# Patient Record
Sex: MALE | Race: BLACK OR AFRICAN AMERICAN | Employment: OTHER | URBAN - METROPOLITAN AREA
[De-identification: names, ages, dates, MRNs, and addresses within clinical notes are randomized per-mention and may not be internally consistent; named-entity substitution may affect disease eponyms.]

---

## 2016-12-06 LAB — PSA, EXTERNAL: 0.7

## 2017-01-19 ENCOUNTER — HOSPITAL ENCOUNTER (OUTPATIENT)
Dept: LAB | Age: 77
Discharge: HOME | End: 2017-01-19
Payer: MEDICARE

## 2017-01-19 DIAGNOSIS — E78.5 DYSLIPIDEMIA: ICD-10-CM

## 2017-01-19 DIAGNOSIS — I10 ESSENTIAL HYPERTENSION: ICD-10-CM

## 2017-01-19 LAB
ALBUMIN SERPL BCP-MCNC: 4.1 G/DL (ref 3.4–5)
ALBUMIN/GLOB SERPL: 1.3 {RATIO} (ref 0.8–1.7)
ALP SERPL-CCNC: 89 U/L (ref 45–117)
ALT SERPL-CCNC: 25 U/L (ref 16–61)
ANION GAP BLD CALC-SCNC: 8 MMOL/L (ref 3–18)
AST SERPL W P-5'-P-CCNC: 20 U/L (ref 15–37)
BILIRUB SERPL-MCNC: 0.6 MG/DL (ref 0.2–1)
BUN SERPL-MCNC: 11 MG/DL (ref 7–18)
BUN/CREAT SERPL: 9 (ref 12–20)
CALCIUM SERPL-MCNC: 9 MG/DL (ref 8.5–10.1)
CHLORIDE SERPL-SCNC: 104 MMOL/L (ref 100–108)
CHOLEST SERPL-MCNC: 189 MG/DL
CO2 SERPL-SCNC: 28 MMOL/L (ref 21–32)
CREAT SERPL-MCNC: 1.16 MG/DL (ref 0.6–1.3)
GLOBULIN SER CALC-MCNC: 3.1 G/DL (ref 2–4)
GLUCOSE SERPL-MCNC: 100 MG/DL (ref 74–99)
HDLC SERPL-MCNC: 53 MG/DL (ref 40–60)
HDLC SERPL: 3.6 {RATIO} (ref 0–5)
LDLC SERPL CALC-MCNC: 120.6 MG/DL (ref 0–100)
LIPID PROFILE,FLP: ABNORMAL
POTASSIUM SERPL-SCNC: 4.3 MMOL/L (ref 3.5–5.5)
PROT SERPL-MCNC: 7.2 G/DL (ref 6.4–8.2)
SODIUM SERPL-SCNC: 140 MMOL/L (ref 136–145)
TRIGL SERPL-MCNC: 77 MG/DL (ref ?–150)
VLDLC SERPL CALC-MCNC: 15.4 MG/DL

## 2017-01-19 PROCEDURE — 80053 COMPREHEN METABOLIC PANEL: CPT | Performed by: INTERNAL MEDICINE

## 2017-01-19 PROCEDURE — 80061 LIPID PANEL: CPT | Performed by: INTERNAL MEDICINE

## 2017-01-19 PROCEDURE — 36415 COLL VENOUS BLD VENIPUNCTURE: CPT | Performed by: INTERNAL MEDICINE

## 2017-01-26 ENCOUNTER — OFFICE VISIT (OUTPATIENT)
Dept: INTERNAL MEDICINE CLINIC | Age: 77
End: 2017-01-26

## 2017-01-26 VITALS
RESPIRATION RATE: 18 BRPM | DIASTOLIC BLOOD PRESSURE: 57 MMHG | HEART RATE: 68 BPM | WEIGHT: 221 LBS | TEMPERATURE: 97.9 F | OXYGEN SATURATION: 98 % | HEIGHT: 72 IN | SYSTOLIC BLOOD PRESSURE: 113 MMHG | BODY MASS INDEX: 29.93 KG/M2

## 2017-01-26 DIAGNOSIS — I10 ESSENTIAL HYPERTENSION: Primary | ICD-10-CM

## 2017-01-26 DIAGNOSIS — E78.5 DYSLIPIDEMIA: ICD-10-CM

## 2017-01-26 NOTE — PATIENT INSTRUCTIONS

## 2017-01-26 NOTE — MR AVS SNAPSHOT
Visit Information Date & Time Provider Department Dept. Phone Encounter #  
 1/26/2017  8:00 AM Chaitanya Camp MD [facility] [employer] [facility]  Follow-up Instructions Return in about 6 months (around 7/26/2017) for OV, and Medicare Wellness Visit, labs 1 week before. Your Appointments 2/15/2017  3:20 PM  
CARELINK with [place]  [facility] [place] [facility] ([facility]) Appt Note: Carelink in 3 months [facility] ([facility] [address] [address] [id number] [phone] Cecil Yi  
  
    
 3/3/2017  8:50 AM  
Nurse Visit with [facility] NURSE [facility] ([facility]) Appt Note: Psa //[employer] [address] [place] [address] [phone]  
  
   
 [address] [place] [address]  
  
    
 3/10/2017 10:15 AM  
ESTABLISHED PATIENT with Navin Franco MD  
[facility] ([facility]) Appt Note: Mailed appt [address] [place] [address]  
[address] [facility] [address] [place] [address]  
  
    
 5/8/2017  8:00 AM  
Follow Up with Radha Chamberlain MD  
[facility] [place] [facility] ([facility]) Appt Note: Follow up with EKG in 6 months [facility] [address] [address] [phone] [phone] [address] Upcoming Health Maintenance Date Due ZOSTER VACCINE AGE 60> 11/30/1999 GLAUCOMA SCREENING Q2Y 11/30/2004 Pneumococcal 65+ High/Highest Risk (2 of 2 - PPSV23) 1/31/2017* MEDICARE YEARLY EXAM 7/27/2017 DTaP/Tdap/Td series (2 - Td) 10/27/2023 COLONOSCOPY 10/14/2024 *Topic was postponed. The date shown is not the original due date.    
  
Allergies as of 1/26/2017  Review Complete On: 1/26/2017 By: Chaitanya Camp MD  
  
 Severity Noted Reaction Type Reactions Shellfish Containing Products    Swelling Current Immunizations  Reviewed on 11/7/2016 Name Date Influenza Vaccine 11/19/2014, 11/12/2013 Influenza Vaccine (Quad) PF 11/7/2016, 10/23/2015 Influenza Vaccine Split 11/23/2011, 12/15/2010 Influenza Vaccine Whole 12/9/2012 Pneumococcal Conjugate (PCV-13) 7/26/2016 Pneumococcal Vaccine (Unspecified Type) 5/10/2004 TD Vaccine 5/10/2004 Tdap 10/27/2013 10:12 AM  
  
 Not reviewed this visit You Were Diagnosed With   
  
 Codes Comments Essential hypertension    -  Primary ICD-10-CM: I10 
ICD-9-CM: 401.9 Dyslipidemia     ICD-10-CM: E78.5 ICD-9-CM: 272.4 Vitals BP Pulse Temp Resp Height(growth percentile) Weight(growth percentile) 113/57 (BP 1 Location: Left arm, BP Patient Position: Sitting) 68 97.9 °F (36.6 °C) (Oral) 18 6' 0.25\" (1.835 m) 221 lb (100.2 kg) SpO2 BMI Smoking Status 98% 29.77 kg/m2 Former Smoker Vitals History BMI and BSA Data Body Mass Index Body Surface Area  
 29.77 kg/m 2 2.26 m 2 Preferred Pharmacy Pharmacy Name Phone [address], [facility] [phone] Your Updated Medication List  
  
   
This list is accurate as of: 1/26/17  8:35 AM.  Always use your most recent med list.  
  
  
  
  
 aspirin 81 mg tablet Take 2 Tabs by mouth daily. dilTIAZem  mg ER capsule Commonly known as:  CARDIZEM CD  
TAKE 1 CAPSULE TWICE A DAY  
  
 FISH OIL 1,000 mg Cap Generic drug:  omega-3 fatty acids-vitamin e Take 1 Cap by mouth daily. PROSTATE HEALTH FORMULA PO Take 1 Tab by mouth daily. Follow-up Instructions Return in about 6 months (around 7/26/2017) for OV, and Medicare Wellness Visit, labs 1 week before. Patient Instructions DASH Diet: Care Instructions Your Care Instructions The DASH diet is an eating plan that can help lower your blood pressure. DASH stands for Dietary Approaches to Stop Hypertension. Hypertension is high blood pressure. The DASH diet focuses on eating foods that are high in calcium, potassium, and magnesium. These nutrients can lower blood pressure. The foods that are highest in these nutrients are fruits, vegetables, low-fat dairy products, nuts, seeds, and legumes. But taking calcium, potassium, and magnesium supplements instead of eating foods that are high in those nutrients does not have the same effect. The DASH diet also includes whole grains, fish, and poultry. The DASH diet is one of several lifestyle changes your doctor may recommend to lower your high blood pressure. Your doctor may also want you to decrease the amount of sodium in your diet. Lowering sodium while following the DASH diet can lower blood pressure even further than just the DASH diet alone. Follow-up care is a key part of your treatment and safety. Be sure to make and go to all appointments, and call your doctor if you are having problems. It's also a good idea to know your test results and keep a list of the medicines you take. How can you care for yourself at home? Following the DASH diet · Eat 4 to 5 servings of fruit each day. A serving is 1 medium-sized piece of fruit, ½ cup chopped or canned fruit, 1/4 cup dried fruit, or 4 ounces (½ cup) of fruit juice. Choose fruit more often than fruit juice. · Eat 4 to 5 servings of vegetables each day. A serving is 1 cup of lettuce or raw leafy vegetables, ½ cup of chopped or cooked vegetables, or 4 ounces (½ cup) of vegetable juice. Choose vegetables more often than vegetable juice. · Get 2 to 3 servings of low-fat and fat-free dairy each day. A serving is 8 ounces of milk, 1 cup of yogurt, or 1 ½ ounces of cheese. · Eat 6 to 8 servings of grains each day.  A serving is 1 slice of bread, 1 ounce of dry cereal, or ½ cup of cooked rice, pasta, or cooked cereal. Try to choose whole-grain products as much as possible. · Limit lean meat, poultry, and fish to 2 servings each day. A serving is 3 ounces, about the size of a deck of cards. · Eat 4 to 5 servings of nuts, seeds, and legumes (cooked dried beans, lentils, and split peas) each week. A serving is 1/3 cup of nuts, 2 tablespoons of seeds, or ½ cup of cooked beans or peas. · Limit fats and oils to 2 to 3 servings each day. A serving is 1 teaspoon of vegetable oil or 2 tablespoons of salad dressing. · Limit sweets and added sugars to 5 servings or less a week. A serving is 1 tablespoon jelly or jam, ½ cup sorbet, or 1 cup of lemonade. · Eat less than 2,300 milligrams (mg) of sodium a day. If you limit your sodium to 1,500 mg a day, you can lower your blood pressure even more. Tips for success · Start small. Do not try to make dramatic changes to your diet all at once. You might feel that you are missing out on your favorite foods and then be more likely to not follow the plan. Make small changes, and stick with them. Once those changes become habit, add a few more changes. · Try some of the following: ¨ Make it a goal to eat a fruit or vegetable at every meal and at snacks. This will make it easy to get the recommended amount of fruits and vegetables each day. ¨ Try yogurt topped with fruit and nuts for a snack or healthy dessert. ¨ Add lettuce, tomato, cucumber, and onion to sandwiches. ¨ Combine a ready-made pizza crust with low-fat mozzarella cheese and lots of vegetable toppings. Try using tomatoes, squash, spinach, broccoli, carrots, cauliflower, and onions. ¨ Have a variety of cut-up vegetables with a low-fat dip as an appetizer instead of chips and dip. ¨ Sprinkle sunflower seeds or chopped almonds over salads. Or try adding chopped walnuts or almonds to cooked vegetables. ¨ Try some vegetarian meals using beans and peas. Add garbanzo or kidney beans to salads. Make burritos and tacos with mashed martinez beans or black beans. Where can you learn more? Go to http://lachelle-gi.info/. Enter G936 in the search box to learn more about \"DASH Diet: Care Instructions. \" Current as of: March 23, 2016 Content Version: 11.1 © 7040-9355 [employer]. Care instructions adapted under license by [employer] (which disclaims liability or warranty for this information). If you have questions about a medical condition or this instruction, always ask your healthcare professional. [place] [address] any warranty or liability for your use of this information. Introducing [facility] & HE[facility] SERVICES! Dear Mary Ann Sauer: Thank you for requesting a [employer] account. Our records indicate that you already have an active [employer] account. You can access your account anytime at [email or web address] Did you know that you can access your hospital and ER discharge instructions at any time in [employer]? You can also review all of your test results from your hospital stay or ER visit. Additional Information If you have questions, please visit the Frequently Asked Questions section of the [employer] website at [email or web address] Remember, [employer] is NOT to be used for urgent needs. For medical emergencies, dial 911. Now available from your iPhone and Android! Please provide this summary of care documentation to your next provider. Your primary care clinician is listed as JOSEP WOODALL. If you have any questions after today's visit, please call [phone].

## 2017-01-26 NOTE — PROGRESS NOTES
Cecil Ortiz is a 67 y.o.  male and presents with Cholesterol Problem and Hypertension (f/u)      SUBJECTIVE:  Pt's HTN and Afib is well controlled on Cardizem. His cholesterol remains borderline controlled. He is currently a candidate for statin therapy but is currently declining using more medication. He will also try to exercise more to improve his cholesterol. His right knee stops him from walking so he will try to find something else to do for exercise. Pt continues to be followed by cardiology for h/o PAF    Respiratory ROS: negative for - shortness of breath  Cardiovascular ROS: negative for - chest pain    Current Outpatient Prescriptions   Medication Sig    dilTIAZem CD (CARDIZEM CD) 240 mg ER capsule TAKE 1 CAPSULE TWICE A DAY    aspirin 81 mg tablet Take 2 Tabs by mouth daily.  ZINC MTH/COPPER/SAW PALM/GNSG (PROSTATE HEALTH FORMULA PO) Take 1 Tab by mouth daily.  omega-3 fatty acids-vitamin e (FISH OIL) 1,000 mg Cap Take 1 Cap by mouth daily. No current facility-administered medications for this visit.           OBJECTIVE:  alert, well appearing, and in no distress  Visit Vitals    /57 (BP 1 Location: Left arm, BP Patient Position: Sitting)    Pulse 68    Temp 97.9 °F (36.6 °C) (Oral)    Resp 18    Ht 6' 0.25\" (1.835 m)    Wt 221 lb (100.2 kg)    SpO2 98%    BMI 29.77 kg/m2      well developed and well nourished  Neck - supple, no significant adenopathy  Chest - clear to auscultation, no wheezes, rales or rhonchi, symmetric air entry  Heart - normal rate, regular rhythm, normal S1, S2, no murmurs, rubs, clicks or gallops  Extremities - peripheral pulses normal, no pedal edema, no clubbing or cyanosis  Skin - normal coloration and turgor, no rashes, no suspicious skin lesions noted      Labs:   Lab Results   Component Value Date/Time    Cholesterol, total 189 01/19/2017 07:48 AM    HDL Cholesterol 53 01/19/2017 07:48 AM    LDL, calculated 120.6 01/19/2017 07:48 AM Triglyceride 77 01/19/2017 07:48 AM    CHOL/HDL Ratio 3.6 01/19/2017 07:48 AM     Lab Results   Component Value Date/Time    AST 20 01/19/2017 07:48 AM    Alk. phosphatase 89 01/19/2017 07:48 AM     Lab Results   Component Value Date/Time    GFR est AA >60 01/19/2017 07:48 AM    GFR est non-AA >60 01/19/2017 07:48 AM    Creatinine 1.16 01/19/2017 07:48 AM    BUN 11 01/19/2017 07:48 AM    Sodium 140 01/19/2017 07:48 AM    Potassium 4.3 01/19/2017 07:48 AM    Chloride 104 01/19/2017 07:48 AM    CO2 28 01/19/2017 07:48 AM      Lab Results   Component Value Date/Time    Glucose 100 01/19/2017 07:48 AM        Discussed the patient's BMI with him. The BMI follow up plan is as follows: BMI is out of normal parameters and plan is as follows: I have counseled this patient on diet and exercise regimens. Assessment/Plan      ICD-10-CM ICD-9-CM    1. Essential hypertension I10 401.9 Well controlled on Cardizem METABOLIC PANEL, COMPREHENSIVE   2. Dyslipidemia E78.5 272.4 Will continue to try and improve with diet. Pt declines statin currently LIPID PANEL                Follow-up Disposition:  Return in about 6 months (around 7/26/2017) for OV, and Medicare Wellness Visit, labs 1 week before. Reviewed plan of care. Patient has provided input and agrees with goals.

## 2017-01-26 NOTE — PROGRESS NOTES
Patient is in the office today for a 6 month follow up. Do you have an Advance Directive yes - will bring copy      1. Have you been to the ER, urgent care clinic since your last visit? Hospitalized since your last visit? No    2. Have you seen or consulted any other health care providers outside of the [address] [place] [address] since your last visit? Include any pap smears or colon screening.  No

## 2017-02-15 ENCOUNTER — OFFICE VISIT (OUTPATIENT)
Dept: CARDIOLOGY CLINIC | Age: 77
End: 2017-02-15

## 2017-02-15 DIAGNOSIS — I49.5 SICK SINUS SYNDROME (HCC): Primary | ICD-10-CM

## 2017-02-15 DIAGNOSIS — Z95.0 CARDIAC PACEMAKER IN SITU: ICD-10-CM

## 2017-02-28 NOTE — PROGRESS NOTES
I have personally seen and evaluated the device findings. Interrogation reviewed and I agree with assessment.     Jassi Lucero

## 2017-06-13 RX ORDER — DILTIAZEM HYDROCHLORIDE 240 MG/1
CAPSULE, COATED, EXTENDED RELEASE ORAL
Qty: 180 CAP | Refills: 3 | Status: SHIPPED | OUTPATIENT
Start: 2017-06-13 | End: 2018-07-03 | Stop reason: SDUPTHER

## 2017-06-29 ENCOUNTER — OFFICE VISIT (OUTPATIENT)
Dept: CARDIOLOGY CLINIC | Age: 77
End: 2017-06-29

## 2017-06-29 VITALS
HEART RATE: 64 BPM | SYSTOLIC BLOOD PRESSURE: 122 MMHG | HEIGHT: 75 IN | DIASTOLIC BLOOD PRESSURE: 70 MMHG | WEIGHT: 219 LBS | BODY MASS INDEX: 27.23 KG/M2 | OXYGEN SATURATION: 98 %

## 2017-06-29 DIAGNOSIS — Z95.0 CARDIAC PACEMAKER IN SITU: ICD-10-CM

## 2017-06-29 DIAGNOSIS — E78.5 DYSLIPIDEMIA: ICD-10-CM

## 2017-06-29 DIAGNOSIS — I10 ESSENTIAL HYPERTENSION: Primary | ICD-10-CM

## 2017-06-29 DIAGNOSIS — R07.9 CHEST PAIN, UNSPECIFIED TYPE: ICD-10-CM

## 2017-06-29 DIAGNOSIS — I49.5 SICK SINUS SYNDROME (HCC): ICD-10-CM

## 2017-06-29 DIAGNOSIS — I48.0 PAROXYSMAL ATRIAL FIBRILLATION (HCC): ICD-10-CM

## 2017-06-29 NOTE — PROGRESS NOTES
HISTORY OF PRESENT ILLNESS  Gordon Gifford is a 67 y.o. male. HPI    Patient presents for a scheduled followup visit. He has a past medical history significant for paroxysmal atrial fibrillation, hypertension, and sick sinus syndrome, status post dual-chamber permanent pacemaker in March 2011. The patient was last seen in the office 7 months ago. Since last visit, the patient describes 1 or 2 episodes of exertional chest pain which only lasted for a few seconds before subsiding. This does not occur with every activity and appeared to be very sporadic. He also has noted feet and ankle swelling at the end of the day which he reports as minor, more noticeable when he is on his feet all day which always improves at night when he goes to sleep. He denies any shortness of breath at rest or with exertion. No orthopnea, no PND, no claudication. He states his activity tolerance has not changed since last visit. Past Medical History:   Diagnosis Date    Atrial fibrillation (HCC)     CHADS 0  (-CHF, -HTN, -AGE, -DM, -CVA)    Cardiac echocardiogram 06/29/2010    EF 70-75%. Mild conc LVh. Gr 1 DDfx. Mild DELMY.  Cardiac Holter monitor, normal 06/28/2010    Normal Holter study.  Cardiac nuclear imaging test 11/22/2010    Sm area of apical ischemia and inferolateral scar, both possibly apical thinning. No WMA. EF 60%.  Impotence of organic origin     Pacemaker     3/11  [employer]TR[employer]IC    Personal history of malignant neoplasm of prostate     T1a, [place] 6 (3+3)     Prostate cancer ([address]     PUD (peptic ulcer disease)     Sick sinus syndrome ([address]     status post implantation of Medtronic dual-chamber permanent pacemaker 3/22/11.  Unspecified essential hypertension     Venereal disease       Current Outpatient Prescriptions   Medication Sig Dispense Refill    dilTIAZem CD (CARDIZEM CD) 240 mg ER capsule TAKE 1 CAPSULE TWICE A  Cap 3    aspirin 81 mg tablet Take 2 Tabs by mouth daily.  1 Tab 0    ZINC MTH/COPPER/SAW PALM/GNSG (PROSTATE HEALTH FORMULA PO) Take 1 Tab by mouth daily.  omega-3 fatty acids-vitamin e (FISH OIL) 1,000 mg Cap Take 1 Cap by mouth daily. Allergies   Allergen Reactions    Shellfish Containing Products Swelling      Social History   Substance Use Topics    Smoking status: Former Smoker     Packs/day: 0.25     Years: 1.00     Quit date: 4/8/1981    Smokeless tobacco: Never Used    Alcohol use No         Review of Systems   Constitutional: Negative for chills, fever and weight loss. HENT: Negative for nosebleeds. Eyes: Negative for blurred vision and double vision. Respiratory: Negative for cough, shortness of breath and wheezing. Cardiovascular: Positive for chest pain ( Atypical) and leg swelling. Negative for palpitations, orthopnea, claudication and PND. Gastrointestinal: Negative for abdominal pain, heartburn, nausea and vomiting. Genitourinary: Negative for dysuria and hematuria. Musculoskeletal: Negative for falls and myalgias. Skin: Negative for rash. Neurological: Negative for dizziness, focal weakness and headaches. Endo/Heme/Allergies: Does not bruise/bleed easily. Psychiatric/Behavioral: Negative for substance abuse. Visit Vitals    /70 (BP 1 Location: Left arm, BP Patient Position: Sitting)    Pulse 64    Ht 6' 2.5\" (1.892 m)    Wt 99.3 kg (219 lb)    SpO2 98%    BMI 27.74 kg/m2      Physical Exam   Constitutional: He is oriented to person, place, and time. He appears well-developed and well-nourished. HENT:   Head: Normocephalic and atraumatic. Eyes: Conjunctivae are normal.   Neck: Neck supple. No JVD present. Carotid bruit is not present. Cardiovascular: Normal rate, regular rhythm, S1 normal, S2 normal and normal pulses. Exam reveals no gallop and no S3. No murmur heard. Left-sided pacemaker site is well healed. Pulmonary/Chest: Breath sounds normal. He has no wheezes. He has no rales. Abdominal: Soft. Bowel sounds are normal. There is no tenderness. Musculoskeletal: He exhibits no edema. Neurological: He is alert and oriented to person, place, and time. Skin: Skin is warm and dry. EKG: Atrial paced rhythm, intrinsic QRS conduction with an incomplete right bundle branch block, no ST or T wave abnormalities. No change compared to the previous EKG. Pacemaker interrogation. Battery life estimated at 5 years. No significant arrhythmias identified. Pacing in the atrium 98%, and in the ventricle 0%. Scanned document for details. ASSESSMENT and PLAN    Hypertension. Patient's blood pressure now appears well-controlled on Cardizem as monotherapy. Paroxysmal atrial fibrillation. No significant episodes of atrial fibrillation over the past 18 months. I would continue his regimen of Cardizem and aspirin for now. Sick sinus syndrome. Status post dual-chamber permanent pacemaker. Patient's device is working properly interrogation today. The patient is pacing in the atrium 98% of the time. Battery life estimated at 5 years. Dyslipidemia. This is only been mildly elevated in the past.  This is followed closely by his PCP. The patient works on lifestyle modification. Atypical chest pain. This does not sound cardiac in nature, however, I have asked the patient to let us know if this becomes more frequent or regular. If that is the case, I would recommend a repeat stress test.    Dependent edema. This is mild at best.  At this point I would not recommend any particular therapies. Patient should avoid high sodium containing foods. CareLink device check in 3 months. Followup in 6 months, Sooner if needed.

## 2017-06-29 NOTE — MR AVS SNAPSHOT
Visit Information Date & Time Provider Department Dept. Phone Encounter #  
 6/29/2017  9:20 AM Franko Hunt MD [facility] [address] [id number] Your Appointments 7/20/2017  8:05 AM  
LAB with Carey Felipe MD  
[facility] (--) Appt Note: 6 month follow up and labs [address] [id number] [phone]  
  
   
 [address] [phone]  
  
    
 7/27/2017  8:30 AM  
Office Visit with Carey Felipe MD  
[facility] at [facility] (--) Appt Note: 6 mos fu per NH and [address] [id number] [phone]  
  
   
 [address] [phone] 9/15/2017 10:30 AM  
Nurse Visit with [facility] NURSE [facility] ([facility]) Appt Note: Return in about 2 months (around 5/10/2017) for LUTS. [address] [place] [address]  
[address] [facility] [address] [place] [address]  
  
    
  
 9/22/2017 10:15 AM  
Any with Alivia Flores MD  
[facility] ([address] [place] [address]) Appt Note: Return in about 2 months (around 5/10/2017) for LUTS. [address] [place] [address]  
[address] [facility] [address] [facility] [address] [place] [address] Upcoming Health Maintenance Date Due ZOSTER VACCINE AGE 60> 11/30/1999 GLAUCOMA SCREENING Q2Y 11/30/2004 Pneumococcal 65+ High/Highest Risk (2 of 2 - PPSV23) 9/20/2016 MEDICARE YEARLY EXAM 7/27/2017 INFLUENZA AGE 9 TO ADULT 8/1/2017 DTaP/Tdap/Td series (2 - Td) 10/27/2023 COLONOSCOPY 10/14/2024 Allergies as of 6/29/2017  Review Complete On: 6/29/2017 By: Donna Aguiar Severity Noted Reaction Type Reactions Shellfish Containing Products    Swelling Current Immunizations  Reviewed on 11/7/2016 Name Date Influenza Vaccine 11/19/2014, 11/12/2013 Influenza Vaccine (Quad) PF 11/7/2016, 10/23/2015 Influenza Vaccine Split 11/23/2011, 12/15/2010 Influenza Vaccine Whole 12/9/2012 Pneumococcal Conjugate (PCV-13) 7/26/2016 Pneumococcal Vaccine (Unspecified Type) 5/10/2004 TD Vaccine 5/10/2004 Tdap 10/27/2013 10:12 AM  
  
 Not reviewed this visit You Were Diagnosed With   
  
 Codes Comments Essential hypertension    -  Primary ICD-10-CM: I10 
ICD-9-CM: 401.9 Vitals BP Pulse Height(growth percentile) Weight(growth percentile) SpO2 BMI  
 122/70 (BP 1 Location: Left arm, BP Patient Position: Sitting) 64 6' 2.5\" (1.892 m) 219 lb (99.3 kg) 98% 27.74 kg/m2 Smoking Status Former Smoker Vitals History BMI and BSA Data Body Mass Index Body Surface Area  
 27.74 kg/m 2 2.28 m 2 Preferred Pharmacy Pharmacy Name Phone [address] [facility] [phone] Your Updated Medication List  
  
   
This list is accurate as of: 6/29/17  9:47 AM.  Always use your most recent med list.  
  
  
  
  
 aspirin 81 mg tablet Take 2 Tabs by mouth daily. dilTIAZem  mg ER capsule Commonly known as:  CARDIZEM CD  
TAKE 1 CAPSULE TWICE A DAY  
  
 FISH OIL 1,000 mg Cap Generic drug:  omega-3 fatty acids-vitamin e Take 1 Cap by mouth daily. PROSTATE HEALTH FORMULA PO Take 1 Tab by mouth daily. We Performed the Following AMB POC EKG ROUTINE W/ 12 LEADS, INTER & REP [12298 CPT(R)] Introducing [facility]! Dear An Worley: Thank you for requesting a [email or web address] account. Our records indicate that you already have an active [email or web address] account. You can access your account anytime at [email or web address] See [employer] Did you know that you can access your hospital and ER discharge instructions at any time in [email or web address]?   You can also review all of your test results from your hospital stay or ER visit. Additional Information If you have questions, please visit the Frequently Asked Questions section of the [employer] website at [email or web address] Remember, [employer] is NOT to be used for urgent needs. For medical emergencies, dial 911. Now available from your iPhone and Android! Please provide this summary of care documentation to your next provider. Your primary care clinician is listed as JOSEP WOODALL. If you have any questions after today's visit, please call [phone].

## 2017-07-20 ENCOUNTER — HOSPITAL ENCOUNTER (OUTPATIENT)
Dept: LAB | Age: 77
Discharge: HOME | End: 2017-07-20
Payer: MEDICARE

## 2017-07-20 DIAGNOSIS — E78.5 DYSLIPIDEMIA: ICD-10-CM

## 2017-07-20 DIAGNOSIS — I10 ESSENTIAL HYPERTENSION: ICD-10-CM

## 2017-07-20 LAB
ALBUMIN SERPL BCP-MCNC: 4.1 G/DL (ref 3.4–5)
ALBUMIN/GLOB SERPL: 1.4 {RATIO} (ref 0.8–1.7)
ALP SERPL-CCNC: 83 U/L (ref 45–117)
ALT SERPL-CCNC: 25 U/L (ref 16–61)
ANION GAP BLD CALC-SCNC: 7 MMOL/L (ref 3–18)
AST SERPL W P-5'-P-CCNC: 17 U/L (ref 15–37)
BILIRUB SERPL-MCNC: 0.5 MG/DL (ref 0.2–1)
BUN SERPL-MCNC: 14 MG/DL (ref 7–18)
BUN/CREAT SERPL: 12 (ref 12–20)
CALCIUM SERPL-MCNC: 8.5 MG/DL (ref 8.5–10.1)
CHLORIDE SERPL-SCNC: 105 MMOL/L (ref 100–108)
CHOLEST SERPL-MCNC: 165 MG/DL
CO2 SERPL-SCNC: 27 MMOL/L (ref 21–32)
CREAT SERPL-MCNC: 1.13 MG/DL (ref 0.6–1.3)
GLOBULIN SER CALC-MCNC: 3 G/DL (ref 2–4)
GLUCOSE SERPL-MCNC: 95 MG/DL (ref 74–99)
HDLC SERPL-MCNC: 50 MG/DL (ref 40–60)
HDLC SERPL: 3.3 {RATIO} (ref 0–5)
LDLC SERPL CALC-MCNC: 99.2 MG/DL (ref 0–100)
LIPID PROFILE,FLP: NORMAL
POTASSIUM SERPL-SCNC: 4.2 MMOL/L (ref 3.5–5.5)
PROT SERPL-MCNC: 7.1 G/DL (ref 6.4–8.2)
SODIUM SERPL-SCNC: 139 MMOL/L (ref 136–145)
TRIGL SERPL-MCNC: 79 MG/DL (ref ?–150)
VLDLC SERPL CALC-MCNC: 15.8 MG/DL

## 2017-07-20 PROCEDURE — 80053 COMPREHEN METABOLIC PANEL: CPT | Performed by: INTERNAL MEDICINE

## 2017-07-20 PROCEDURE — 36415 COLL VENOUS BLD VENIPUNCTURE: CPT | Performed by: INTERNAL MEDICINE

## 2017-07-20 PROCEDURE — 80061 LIPID PANEL: CPT | Performed by: INTERNAL MEDICINE

## 2017-07-27 ENCOUNTER — OFFICE VISIT (OUTPATIENT)
Dept: INTERNAL MEDICINE CLINIC | Age: 77
End: 2017-07-27

## 2017-07-27 VITALS
WEIGHT: 219 LBS | SYSTOLIC BLOOD PRESSURE: 106 MMHG | RESPIRATION RATE: 18 BRPM | TEMPERATURE: 98.2 F | DIASTOLIC BLOOD PRESSURE: 54 MMHG | OXYGEN SATURATION: 98 % | BODY MASS INDEX: 27.23 KG/M2 | HEART RATE: 64 BPM | HEIGHT: 75 IN

## 2017-07-27 DIAGNOSIS — Z13.39 SCREENING FOR ALCOHOLISM: ICD-10-CM

## 2017-07-27 DIAGNOSIS — E78.5 DYSLIPIDEMIA: ICD-10-CM

## 2017-07-27 DIAGNOSIS — I10 ESSENTIAL HYPERTENSION: ICD-10-CM

## 2017-07-27 DIAGNOSIS — Z23 ENCOUNTER FOR IMMUNIZATION: ICD-10-CM

## 2017-07-27 DIAGNOSIS — Z00.00 ROUTINE GENERAL MEDICAL EXAMINATION AT A HEALTH CARE FACILITY: Primary | ICD-10-CM

## 2017-07-27 NOTE — PROGRESS NOTES
Patient is in the office today for a 6  month follow up, and Medicare Wellness Visit. 1. Have you been to the ER, urgent care clinic since your last visit? Hospitalized since your last visit? No    2. Have you seen or consulted any other health care providers outside of the [address] [place] [address] since your last visit? Include any pap smears or colon screening. No        This is a Subsequent Medicare Annual Wellness Visit providing Personalized Prevention Plan Services (PPPS) (Performed 12 months after initial AWV and PPPS )    I have reviewed the patient's medical history in detail and updated the computerized patient record. History     Past Medical History:   Diagnosis Date    Atrial fibrillation (HCC)     CHADS 0  (-CHF, -HTN, -AGE, -DM, -CVA)    Cardiac echocardiogram 06/29/2010    EF 70-75%. Mild conc LVh. Gr 1 DDfx. Mild DELMY.  Cardiac Holter monitor, normal 06/28/2010    Normal Holter study.  Cardiac nuclear imaging test 11/22/2010    Sm area of apical ischemia and inferolateral scar, both possibly apical thinning. No WMA. EF 60%.  Impotence of organic origin     Pacemaker     3/11  MEDTRONIC    Personal history of malignant neoplasm of prostate     T1a, [place] 6 (3+3)     Prostate cancer ([facility] [facility]     PUD (peptic ulcer disease)     Sick sinus syndrome ([facility]     status post implantation of Medtronic dual-chamber permanent pacemaker 3/22/11.  Unspecified essential hypertension     Venereal disease       Past Surgical History:   Procedure Laterality Date    HX CATARACT REMOVAL      HX MOHS PROCEDURES  1996    NV COLONOSCOPY FLX DX W/COLLJ SPEC WHEN PFRMD      NV CRYOSURG ABLATION OF PROSTATE  2/08    [facility], Dr. Claudy Donaldson     Current Outpatient Prescriptions   Medication Sig Dispense Refill    dilTIAZem CD (CARDIZEM CD) 240 mg ER capsule TAKE 1 CAPSULE TWICE A  Cap 3    aspirin 81 mg tablet Take 2 Tabs by mouth daily.  1 Tab 0    ZINC MTH/COPPER/SAW PALM/GNSG (PROSTATE HEALTH FORMULA PO) Take 1 Tab by mouth daily.  omega-3 fatty acids-vitamin e (FISH OIL) 1,000 mg Cap Take 1 Cap by mouth daily. Allergies   Allergen Reactions    Shellfish Containing Products Swelling     History reviewed. No pertinent family history. Social History   Substance Use Topics    Smoking status: Former Smoker     Packs/day: 0.25     Years: 1.00     Quit date: 4/8/1981    Smokeless tobacco: Never Used    Alcohol use No     Patient Active Problem List   Diagnosis Code    Atrial fibrillation (HCC) I48.91    Sick sinus syndrome (HCC) I49.5    Impotence of organic origin N52.9    Personal history of malignant neoplasm of prostate Z85.46    Elevated prostate specific antigen (PSA) R97.20    HTN (hypertension) I10    Malignant neoplasm of prostate ([facility] [address] C61    Dyslipidemia E78.5    Essential hypertension I10    ACP (advance care planning) Z71.89    Cardiac pacemaker in situ Z95.0       Depression Risk Factor Screening:     PHQ over the last two weeks 7/27/2017   Little interest or pleasure in doing things Not at all   Feeling down, depressed or hopeless Not at all   Total Score PHQ 2 0     Alcohol Risk Factor Screening:   Patient states he does not drink alcohol. Functional Ability and Level of Safety:     Hearing Loss   Patient states he has some hearing loss. Activities of Daily Living   Self-care. Requires assistance with: no ADLs    Fall Risk     Fall Risk Assessment, last 12 mths 7/27/2017   Able to walk? Yes   Fall in past 12 months? No   Fall with injury? -   Number of falls in past 12 months -     Abuse Screen   Patient is not abused    Review of Systems   Pertinent items are noted in HPI.     Physical Examination     Evaluation of Cognitive Function:  Mood/affect:  neutral  Appearance: age appropriate  Family member/caregiver input: none    Visit Vitals    /54 (BP 1 Location: Left arm, BP Patient Position: Sitting)    Pulse 64    Temp 98.2 °F (36.8 °C) (Oral)    Resp 18    Ht 6' 2.5\" (1.892 m)    Wt 219 lb (99.3 kg)    SpO2 98%    BMI 27.74 kg/m2       Patient Care Team:  Astrid Vickers MD as PCP - Bernadette Vines MD (Urology)  Gaviota Nice MD (Cardiology)  Scott Lechuga MD as Hospitalist (Radiation Oncology)    Advice/Referrals/Counseling   Education and counseling provided:  Are appropriate based on today's review and evaluation  End-of-Life planning (with patient's consent)    Glaucoma Screening-  Has not seen one in 3 yrs. Pt is aware he should make an appointment    Pneumonia Vaccine- UTD  Shingles Vaccine-  Declines currently    Tdap Vaccine-  10/2013    Colonoscopy-  10/2014 Dr Biswas Been normal f/u in 10 yrs    PSA- followed by Dr Dykes Nurse for elevated PSA    Advance Directive-  Has one. Will try to give us copy     Assessment/Plan       ICD-10-CM ICD-9-CM    1. Routine general medical examination at a health care facility Z00.00 V70.0    2. Screening for alcoholism Z13.89 V79.1    3. Encounter for immunization Z23 V03.89    4. Essential hypertension I10 401.9    . A comprehensive 5 year plan for medical care and screening exams was reviewed with pt and they received a copy of it.

## 2017-07-27 NOTE — MR AVS SNAPSHOT
Visit Information Date & Time Provider Department Dept. Phone Encounter #  
 7/27/2017  8:30 AM Binu Garcia MD [facility] [phone] Follow-up Instructions Return in about 6 months (around 1/27/2018) for labs 1 week before. Your Appointments 9/15/2017 10:30 AM  
Nurse Visit with [facility] KAL NURSE [facility] ([address] [place] [address]) Appt Note: Return in about 2 months (around 5/10/2017) for LUTS. [address] [place] [address]  
[phone]  
  
   
 [address] [place] [address]  
  
    
 12/28/2017  1:40 PM  
Follow Up with Peola Kayser, MD  
[facility] ([address] [place] [address]) Appt Note: [facility] [address] [address] [address] [phone] [address] [place] [address]  
  
    
  
 9/22/2017 10:15 AM  
Any with Chevy Deluca MD  
[facility] ([address] [place] [address]) Appt Note: Return in about 2 months (around 5/10/2017) for LUTS. [address] [place] [id number]  
[address] [facility] [address] [place] [address] Upcoming Health Maintenance Date Due ZOSTER VACCINE AGE 60> 9/30/1999 GLAUCOMA SCREENING Q2Y 11/30/2004 Pneumococcal 65+ High/Highest Risk (2 of 2 - PPSV23) 9/20/2016 MEDICARE YEARLY EXAM 7/27/2017 INFLUENZA AGE 9 TO ADULT 8/1/2017 DTaP/Tdap/Td series (2 - Td) 10/27/2023 COLONOSCOPY 10/14/2024 Allergies as of 7/27/2017  Review Complete On: 7/27/2017 By: Binu Garcia MD  
  
 Severity Noted Reaction Type Reactions Shellfish Containing Products    Swelling Current Immunizations  Reviewed on 11/7/2016 Name Date Influenza Vaccine 11/19/2014, 11/12/2013 Influenza Vaccine (Quad) PF 11/7/2016, 10/23/2015 Influenza Vaccine Split 11/23/2011, 12/15/2010 Influenza Vaccine Whole 12/9/2012 Pneumococcal Conjugate (PCV-13) 7/26/2016 TD Vaccine 5/10/2004 Tdap 10/27/2013 10:12 AM  
 ZZZ-RETIRED (DO NOT USE) Pneumococcal Vaccine (Unspecified Type) 5/10/2004 Not reviewed this visit You Were Diagnosed With   
  
 Codes Comments Routine general medical examination at a health care facility    -  Primary ICD-10-CM: Z00.00 ICD-9-CM: V70.0 Screening for alcoholism     ICD-10-CM: Z13.89 ICD-9-CM: V79.1 Encounter for immunization     ICD-10-CM: F43 ICD-9-CM: V03.89 Essential hypertension     ICD-10-CM: I10 
ICD-9-CM: 401.9 Vitals BP Pulse Temp Resp Height(growth percentile) Weight(growth percentile) 106/54 (BP 1 Location: Left arm, BP Patient Position: Sitting) 64 98.2 °F (36.8 °C) (Oral) 18 6' 2.5\" (1.892 m) 219 lb (99.3 kg) SpO2 BMI Smoking Status 98% 27.74 kg/m2 Former Smoker Vitals History BMI and BSA Data Body Mass Index Body Surface Area  
 27.74 kg/m 2 2.28 m 2 Preferred Pharmacy Pharmacy Name Phone [address], [facility] [phone] Your Updated Medication List  
  
   
This list is accurate as of: 7/27/17  8:51 AM.  Always use your most recent med list.  
  
  
  
  
 aspirin 81 mg tablet Take 2 Tabs by mouth daily. dilTIAZem  mg ER capsule Commonly known as:  CARDIZEM CD  
TAKE 1 CAPSULE TWICE A DAY  
  
 FISH OIL 1,000 mg Cap Generic drug:  omega-3 fatty acids-vitamin e Take 1 Cap by mouth daily. PROSTATE HEALTH FORMULA PO Take 1 Tab by mouth daily. Follow-up Instructions Return in about 6 months (around 1/27/2018) for labs 1 week before. Patient Instructions Medicare Part B Preventive Services Limitations Recommendation Scheduled Bone Mass Measurement 
(age 72 & older, biennial) Requires diagnosis related to osteoporosis or estrogen deficiency. Biennial benefit unless patient has history of long-term glucocorticoid tx or baseline is needed because initial test was by other method  NA Cardiovascular Screening Blood Tests (every 5 years) Total cholesterol, HDL, Triglycerides Order as a panel if possible  1/2017 Colorectal Cancer Screening 
-Fecal occult blood test (annual) -Flexible sigmoidoscopy (5y) 
-Screening colonoscopy (10y) -Barium Enema   10/2014 Dr. Zee Maldonado Counseling to Prevent Tobacco Use (up to 8 sessions per year) - Counseling greater than 3 and up to 10 minutes - Counseling greater than 10 minutes Patients must be asymptomatic of tobacco-related conditions to receive as preventive service  NA Diabetes Screening Tests (at least every 3 years, Medicare covers annually or at 6-month intervals for prediabetic patients) Fasting blood sugar (FBS) or glucose tolerance test (GTT) Patient must be diagnosed with one of the following: 
-Hypertension, Dyslipidemia, obesity, previous impaired FBS or GTT 
Or any two of the following: overweight, FH of diabetes, age ? 72, history of gestational diabetes, birth of baby weighing more than 9 pounds  1/2017 Diabetes Self-Management Training (DSMT) (no USPSTF recommendation) Requires referral by treating physician for patient with diabetes or renal disease. 10 hours of initial DSMT session of no less than 30 minutes each in a continuous 12-month period. 2 hours of follow-up DSMT in subsequent years. NA Glaucoma Screening (no USPSTF recommendation) Diabetes mellitus, family history, , age 48 or over,  American, age 72 or over  Ordered Human Immunodeficiency Virus (HIV) Screening (annually for increased risk patients) HIV-1 and HIV-2 by EIA, MICHAEL, rapid antibody test, or oral mucosa transudate Patient must be at increased risk for HIV infection per USPSTF guidelines or pregnant.   Tests covered annually for patients at increased risk.  Pregnant patients may receive up to 3 test during pregnancy. NA Medical Nutrition Therapy (MNT) (for diabetes or renal disease not recommended schedule) Requires referral by treating physician for patient with diabetes or renal disease. Can be provided in same year as diabetes self-management training (DSMT), and CMS recommends medical nutrition therapy take place after DSMT. Up to 3 hours for initial year and 2 hours in subsequent years. NA Prostate Cancer Screening (annually up to age 76) - Digital rectal exam (ASTRID) - Prostate specific antigen (PSA) Annually (age 48 or over), ASTRID not paid separately when covered E/M service is provided on same date Men up to age 76 may need a screening blood test for prostate cancer at certain intervals, depending on their personal and family history. This decision is between the patient and his provider. 3/2017 Seasonal Influenza Vaccination (annually)   10/2016 Pneumococcal Vaccination (once after 65)   5/2004 and Prevnar 13 7/2016 Hepatitis B Vaccinations (if medium/high risk) Medium/high risk factors:  End-stage renal disease, Hemophiliacs who received Factor VIII or IX concentrates, Clients of institutions for the mentally retarded, Persons who live in the same house as a HepB virus carrier, Homosexual men, Illicit injectable drug abusers. NA Shingles Vaccination A shingles vaccine is also recommended once in a lifetime after age 61  Ordered Ultrasound Screening for Abdominal Aortic Aneurysm (AAA) (once) Patient must be referred through IPPE and not have had a screening for abdominal aortic aneurysm before under Medicare. Limited to patients who meet one of the following criteria: 
- Men who are 73-68 years old and have smoked more than 100 cigarettes in their lifetime. 
-Anyone with a FH of AAA 
-Anyone recommended for screening by USPSTF  NA Heart-Healthy Diet: Care Instructions Your Care Instructions A heart-healthy diet has lots of vegetables, fruits, nuts, beans, and whole grains, and is low in salt. It limits foods that are high in saturated fat, such as meats, cheeses, and fried foods. It may be hard to change your diet, but even small changes can lower your risk of heart attack and heart disease. Follow-up care is a key part of your treatment and safety. Be sure to make and go to all appointments, and call your doctor if you are having problems. It's also a good idea to know your test results and keep a list of the medicines you take. How can you care for yourself at home? Watch your portions · Learn what a serving is. A \"serving\" and a \"portion\" are not always the same thing. Make sure that you are not eating larger portions than are recommended. For example, a serving of pasta is ½ cup. A serving size of meat is 2 to 3 ounces. A 3-ounce serving is about the size of a deck of cards. Measure serving sizes until you are good at [place]" them. Keep in mind that restaurants often serve portions that are 2 or 3 times the size of one serving. · To keep your energy level up and keep you from feeling hungry, eat often but in smaller portions. · Eat only the number of calories you need to stay at a healthy weight. If you need to lose weight, eat fewer calories than your body burns (through exercise and other physical activity). Eat more fruits and vegetables · Eat a variety of fruit and vegetables every day. Dark green, deep orange, red, or yellow fruits and vegetables are especially good for you. Examples include spinach, carrots, peaches, and berries. · Keep carrots, celery, and other veggies handy for snacks. Buy fruit that is in season and store it where you can see it so that you will be tempted to eat it. · Cook dishes that have a lot of veggies in them, such as stir-fries and soups. Limit saturated and trans fat · Read food labels, and try to avoid saturated and trans fats.  They increase your risk of heart disease. Trans fat is found in many processed foods such as cookies and crackers. · Use olive or canola oil when you cook. Try cholesterol-lowering spreads, such as Benecol or Take Control. · Bake, broil, grill, or steam foods instead of frying them. · Choose lean meats instead of high-fat meats such as hot dogs and sausages. Cut off all visible fat when you prepare meat. · Eat fish, skinless poultry, and meat alternatives such as soy products instead of high-fat meats. Soy products, such as tofu, may be especially good for your heart. · Choose low-fat or fat-free milk and dairy products. Eat fish · Eat at least two servings of fish a week. Certain fish, such as salmon and tuna, contain omega-3 fatty acids, which may help reduce your risk of heart attack. Eat foods high in fiber · Eat a variety of grain products every day. Include whole-grain foods that have lots of fiber and nutrients. Examples of whole-grain foods include oats, whole wheat bread, and brown rice. · Buy whole-grain breads and cereals, instead of white bread or pastries. Limit salt and sodium · Limit how much salt and sodium you eat to help lower your blood pressure. · Taste food before you salt it. Add only a little salt when you think you need it. With time, your taste buds will adjust to less salt. · Eat fewer snack items, fast foods, and other high-salt, processed foods. Check food labels for the amount of sodium in packaged foods. · Choose low-sodium versions of canned goods (such as soups, vegetables, and beans). Limit sugar · Limit drinks and foods with added sugar. These include candy, desserts, and soda pop. Limit alcohol · Limit alcohol to no more than 2 drinks a day for men and 1 drink a day for women. Too much alcohol can cause health problems. When should you call for help? Watch closely for changes in your health, and be sure to contact your doctor if: · You would like help planning heart-healthy meals. Where can you learn more? Go to http://lachelle-gi.info/. Enter V137 in the search box to learn more about \"Heart-Healthy Diet: Care Instructions. \" Current as of: April 3, 2017 Content Version: 11.3 © 6222-7769 [employer] Infinite. Care instructions adapted under license by [employer] (which disclaims liability or warranty for this information). If you have questions about a medical condition or this instruction, always ask your healthcare professional. [address] any warranty or liability for your use of this information. Heart-Healthy Diet: Care Instructions Your Care Instructions A heart-healthy diet has lots of vegetables, fruits, nuts, beans, and whole grains, and is low in salt. It limits foods that are high in saturated fat, such as meats, cheeses, and fried foods. It may be hard to change your diet, but even small changes can lower your risk of heart attack and heart disease. Follow-up care is a key part of your treatment and safety. Be sure to make and go to all appointments, and call your doctor if you are having problems. It's also a good idea to know your test results and keep a list of the medicines you take. How can you care for yourself at home? Watch your portions · Learn what a serving is. A \"serving\" and a \"portion\" are not always the same thing. Make sure that you are not eating larger portions than are recommended. For example, a serving of pasta is ½ cup. A serving size of meat is 2 to 3 ounces. A 3-ounce serving is about the size of a deck of cards. Measure serving sizes until you are good at [place]" them. Keep in mind that restaurants often serve portions that are 2 or 3 times the size of one serving. · To keep your energy level up and keep you from feeling hungry, eat often but in smaller portions. · Eat only the number of calories you need to stay at a healthy weight. If you need to lose weight, eat fewer calories than your body burns (through exercise and other physical activity). Eat more fruits and vegetables · Eat a variety of fruit and vegetables every day. Dark green, deep orange, red, or yellow fruits and vegetables are especially good for you. Examples include spinach, carrots, peaches, and berries. · Keep carrots, celery, and other veggies handy for snacks. Buy fruit that is in season and store it where you can see it so that you will be tempted to eat it. · Cook dishes that have a lot of veggies in them, such as stir-fries and soups. Limit saturated and trans fat · Read food labels, and try to avoid saturated and trans fats. They increase your risk of heart disease. Trans fat is found in many processed foods such as cookies and crackers. · Use olive or canola oil when you cook. Try cholesterol-lowering spreads, such as Benecol or Take Control. · Bake, broil, grill, or steam foods instead of frying them. · Choose lean meats instead of high-fat meats such as hot dogs and sausages. Cut off all visible fat when you prepare meat. · Eat fish, skinless poultry, and meat alternatives such as soy products instead of high-fat meats. Soy products, such as tofu, may be especially good for your heart. · Choose low-fat or fat-free milk and dairy products. Eat fish · Eat at least two servings of fish a week. Certain fish, such as salmon and tuna, contain omega-3 fatty acids, which may help reduce your risk of heart attack. Eat foods high in fiber · Eat a variety of grain products every day. Include whole-grain foods that have lots of fiber and nutrients. Examples of whole-grain foods include oats, whole wheat bread, and brown rice. · Buy whole-grain breads and cereals, instead of white bread or pastries. Limit salt and sodium · Limit how much salt and sodium you eat to help lower your blood pressure. · Taste food before you salt it. Add only a little salt when you think you need it. With time, your taste buds will adjust to less salt. · Eat fewer snack items, fast foods, and other high-salt, processed foods. Check food labels for the amount of sodium in packaged foods. · Choose low-sodium versions of canned goods (such as soups, vegetables, and beans). Limit sugar · Limit drinks and foods with added sugar. These include candy, desserts, and soda pop. Limit alcohol · Limit alcohol to no more than 2 drinks a day for men and 1 drink a day for women. Too much alcohol can cause health problems. When should you call for help? Watch closely for changes in your health, and be sure to contact your doctor if: 
· You would like help planning heart-healthy meals. Where can you learn more? Go to http://lachellePhase Focusgi.info/. Enter V137 in the search box to learn more about \"Heart-Healthy Diet: Care Instructions. \" Current as of: April 3, 2017 Content Version: 11.3 © 0708-6940 [employer]Brite. Care instructions adapted under license by [employer] (which disclaims liability or warranty for this information). If you have questions about a medical condition or this instruction, always ask your healthcare professional. [address] any warranty or liability for your use of this information. Introducing [facility]! Dear Mohinder Vazquez: Thank you for requesting a Sid[employer] account. Our records indicate that you already have an active Sidense account. You can access your account anytime at [email or web address] Did you know that you can access your hospital and ER discharge instructions at any time in Sidense? You can also review all of your test results from your hospital stay or ER visit. Additional Information If you have questions, please visit the Frequently Asked Questions section of the [employer] website at [email or web address] Remember, [employer] is NOT to be used for urgent needs. For medical emergencies, dial 911. Now available from your iPhone and Android! Please provide this summary of care documentation to your next provider. Your primary care clinician is listed as JOSEP WOODALL. If you have any questions after today's visit, please call [phone].

## 2017-07-27 NOTE — PATIENT INSTRUCTIONS
Medicare Part B Preventive Services Limitations Recommendation Scheduled   Bone Mass Measurement  (age 72 & older, biennial) Requires diagnosis related to osteoporosis or estrogen deficiency. Biennial benefit unless patient has history of long-term glucocorticoid tx or baseline is needed because initial test was by other method  NA   Cardiovascular Screening Blood Tests (every 5 years)  Total cholesterol, HDL, Triglycerides Order as a panel if possible  1/2017   Colorectal Cancer Screening  -Fecal occult blood test (annual)  -Flexible sigmoidoscopy (5y)  -Screening colonoscopy (10y)  -Barium Enema   10/2014 Dr. Lexi Pollard   Counseling to Prevent Tobacco Use (up to 8 sessions per year)  - Counseling greater than 3 and up to 10 minutes  - Counseling greater than 10 minutes Patients must be asymptomatic of tobacco-related conditions to receive as preventive service  NA   Diabetes Screening Tests (at least every 3 years, Medicare covers annually or at 6-month intervals for prediabetic patients)    Fasting blood sugar (FBS) or glucose tolerance test (GTT) Patient must be diagnosed with one of the following:  -Hypertension, Dyslipidemia, obesity, previous impaired FBS or GTT  Or any two of the following: overweight, FH of diabetes, age ? 72, history of gestational diabetes, birth of baby weighing more than 9 pounds  1/2017   Diabetes Self-Management Training (DSMT) (no USPSTF recommendation) Requires referral by treating physician for patient with diabetes or renal disease. 10 hours of initial DSMT session of no less than 30 minutes each in a continuous 12-month period. 2 hours of follow-up DSMT in subsequent years.   NA   Glaucoma Screening (no USPSTF recommendation) Diabetes mellitus, family history, , age 48 or over,  American, age 72 or over  Ordered    Human Immunodeficiency Virus (HIV) Screening (annually for increased risk patients)  HIV-1 and HIV-2 by EIA, MICHAEL, rapid antibody test, or oral mucosa transudate Patient must be at increased risk for HIV infection per USPSTF guidelines or pregnant. Tests covered annually for patients at increased risk. Pregnant patients may receive up to 3 test during pregnancy. NA   Medical Nutrition Therapy (MNT) (for diabetes or renal disease not recommended schedule) Requires referral by treating physician for patient with diabetes or renal disease. Can be provided in same year as diabetes self-management training (DSMT), and CMS recommends medical nutrition therapy take place after DSMT. Up to 3 hours for initial year and 2 hours in subsequent years. NA   Prostate Cancer Screening (annually up to age 76)  - Digital rectal exam (ASTRID)  - Prostate specific antigen (PSA) Annually (age 48 or over), ASTRID not paid separately when covered E/M service is provided on same date  Men up to age 76 may need a screening blood test for prostate cancer at certain intervals, depending on their personal and family history. This decision is between the patient and his provider. 3/2017   Seasonal Influenza Vaccination (annually)   10/2016     Pneumococcal Vaccination (once after 72)   5/2004 and Prevnar 13 7/2016   Hepatitis B Vaccinations (if medium/high risk) Medium/high risk factors:  End-stage renal disease,  Hemophiliacs who received Factor VIII or IX concentrates, Clients of institutions for the mentally retarded, Persons who live in the same house as a HepB virus carrier, Homosexual men, Illicit injectable drug abusers. NA   Shingles Vaccination A shingles vaccine is also recommended once in a lifetime after age 61  Ordered    Ultrasound Screening for Abdominal Aortic Aneurysm (AAA) (once) Patient must be referred through [facility] and not have had a screening for abdominal aortic aneurysm before under Medicare.   Limited to patients who meet one of the following criteria:  - Men who are 73-68 years old and have smoked more than 100 cigarettes in their lifetime.  -Anyone with a FH of AAA  -Anyone recommended for screening by USPSTF  NA          Heart-Healthy Diet: Care Instructions  Your Care Instructions    A heart-healthy diet has lots of vegetables, fruits, nuts, beans, and whole grains, and is low in salt. It limits foods that are high in saturated fat, such as meats, cheeses, and fried foods. It may be hard to change your diet, but even small changes can lower your risk of heart attack and heart disease. Follow-up care is a key part of your treatment and safety. Be sure to make and go to all appointments, and call your doctor if you are having problems. It's also a good idea to know your test results and keep a list of the medicines you take. How can you care for yourself at home? Watch your portions  · Learn what a serving is. A \"serving\" and a \"portion\" are not always the same thing. Make sure that you are not eating larger portions than are recommended. For example, a serving of pasta is ½ cup. A serving size of meat is 2 to 3 ounces. A 3-ounce serving is about the size of a deck of cards. Measure serving sizes until you are good at [place]" them. Keep in mind that restaurants often serve portions that are 2 or 3 times the size of one serving. · To keep your energy level up and keep you from feeling hungry, eat often but in smaller portions. · Eat only the number of calories you need to stay at a healthy weight. If you need to lose weight, eat fewer calories than your body burns (through exercise and other physical activity). Eat more fruits and vegetables  · Eat a variety of fruit and vegetables every day. Dark green, deep orange, red, or yellow fruits and vegetables are especially good for you. Examples include spinach, carrots, peaches, and berries. · Keep carrots, celery, and other veggies handy for snacks. Buy fruit that is in season and store it where you can see it so that you will be tempted to eat it.   · Cook dishes that have a lot of veggies in them, such as stir-fries and soups. Limit saturated and trans fat  · Read food labels, and try to avoid saturated and trans fats. They increase your risk of heart disease. Trans fat is found in many processed foods such as cookies and crackers. · Use olive or canola oil when you cook. Try cholesterol-lowering spreads, such as Benecol or Take Control. · Bake, broil, grill, or steam foods instead of frying them. · Choose lean meats instead of high-fat meats such as hot dogs and sausages. Cut off all visible fat when you prepare meat. · Eat fish, skinless poultry, and meat alternatives such as soy products instead of high-fat meats. Soy products, such as tofu, may be especially good for your heart. · Choose low-fat or fat-free milk and dairy products. Eat fish  · Eat at least two servings of fish a week. Certain fish, such as salmon and tuna, contain omega-3 fatty acids, which may help reduce your risk of heart attack. Eat foods high in fiber  · Eat a variety of grain products every day. Include whole-grain foods that have lots of fiber and nutrients. Examples of whole-grain foods include oats, whole wheat bread, and brown rice. · Buy whole-grain breads and cereals, instead of white bread or pastries. Limit salt and sodium  · Limit how much salt and sodium you eat to help lower your blood pressure. · Taste food before you salt it. Add only a little salt when you think you need it. With time, your taste buds will adjust to less salt. · Eat fewer snack items, fast foods, and other high-salt, processed foods. Check food labels for the amount of sodium in packaged foods. · Choose low-sodium versions of canned goods (such as soups, vegetables, and beans). Limit sugar  · Limit drinks and foods with added sugar. These include candy, desserts, and soda pop. Limit alcohol  · Limit alcohol to no more than 2 drinks a day for men and 1 drink a day for women. Too much alcohol can cause health problems.   When should you call for help?  Watch closely for changes in your health, and be sure to contact your doctor if:  · You would like help planning heart-healthy meals. Where can you learn more? Go to http://lachelle-gi.info/. Enter V137 in the search box to learn more about \"Heart-Healthy Diet: Care Instructions. \"  Current as of: April 3, 2017  Content Version: 11.3  © 4726-0392 [employer]. Care instructions adapted under license by [employer] (which disclaims liability or warranty for this information). If you have questions about a medical condition or this instruction, always ask your healthcare professional. [address] any warranty or liability for your use of this information. Heart-Healthy Diet: Care Instructions  Your Care Instructions    A heart-healthy diet has lots of vegetables, fruits, nuts, beans, and whole grains, and is low in salt. It limits foods that are high in saturated fat, such as meats, cheeses, and fried foods. It may be hard to change your diet, but even small changes can lower your risk of heart attack and heart disease. Follow-up care is a key part of your treatment and safety. Be sure to make and go to all appointments, and call your doctor if you are having problems. It's also a good idea to know your test results and keep a list of the medicines you take. How can you care for yourself at home? Watch your portions  · Learn what a serving is. A \"serving\" and a \"portion\" are not always the same thing. Make sure that you are not eating larger portions than are recommended. For example, a serving of pasta is ½ cup. A serving size of meat is 2 to 3 ounces. A 3-ounce serving is about the size of a deck of cards. Measure serving sizes until you are good at [place]" them. Keep in mind that restaurants often serve portions that are 2 or 3 times the size of one serving.   · To keep your energy level up and keep you from feeling hungry, eat often but in smaller portions. · Eat only the number of calories you need to stay at a healthy weight. If you need to lose weight, eat fewer calories than your body burns (through exercise and other physical activity). Eat more fruits and vegetables  · Eat a variety of fruit and vegetables every day. Dark green, deep orange, red, or yellow fruits and vegetables are especially good for you. Examples include spinach, carrots, peaches, and berries. · Keep carrots, celery, and other veggies handy for snacks. Buy fruit that is in season and store it where you can see it so that you will be tempted to eat it. · Cook dishes that have a lot of veggies in them, such as stir-fries and soups. Limit saturated and trans fat  · Read food labels, and try to avoid saturated and trans fats. They increase your risk of heart disease. Trans fat is found in many processed foods such as cookies and crackers. · Use olive or canola oil when you cook. Try cholesterol-lowering spreads, such as Benecol or Take Control. · Bake, broil, grill, or steam foods instead of frying them. · Choose lean meats instead of high-fat meats such as hot dogs and sausages. Cut off all visible fat when you prepare meat. · Eat fish, skinless poultry, and meat alternatives such as soy products instead of high-fat meats. Soy products, such as tofu, may be especially good for your heart. · Choose low-fat or fat-free milk and dairy products. Eat fish  · Eat at least two servings of fish a week. Certain fish, such as salmon and tuna, contain omega-3 fatty acids, which may help reduce your risk of heart attack. Eat foods high in fiber  · Eat a variety of grain products every day. Include whole-grain foods that have lots of fiber and nutrients. Examples of whole-grain foods include oats, whole wheat bread, and brown rice. · Buy whole-grain breads and cereals, instead of white bread or pastries.   Limit salt and sodium  · Limit how much salt and sodium you eat to help lower your blood pressure. · Taste food before you salt it. Add only a little salt when you think you need it. With time, your taste buds will adjust to less salt. · Eat fewer snack items, fast foods, and other high-salt, processed foods. Check food labels for the amount of sodium in packaged foods. · Choose low-sodium versions of canned goods (such as soups, vegetables, and beans). Limit sugar  · Limit drinks and foods with added sugar. These include candy, desserts, and soda pop. Limit alcohol  · Limit alcohol to no more than 2 drinks a day for men and 1 drink a day for women. Too much alcohol can cause health problems. When should you call for help? Watch closely for changes in your health, and be sure to contact your doctor if:  · You would like help planning heart-healthy meals. Where can you learn more? Go to http://lachelle-gi.info/. Enter V137 in the search box to learn more about \"Heart-Healthy Diet: Care Instructions. \"  Current as of: April 3, 2017  Content Version: 11.3  © 3696-4911 [employer]. Care instructions adapted under license by [employer] (which disclaims liability or warranty for this information). If you have questions about a medical condition or this instruction, always ask your healthcare professional. [address] any warranty or liability for your use of this information.

## 2017-08-09 NOTE — PROGRESS NOTES
Faye Lopez is a 67 y.o.  male and presents with Hypertension (f/u) and Annual Wellness Visit      SUBJECTIVE:  Pt's HTN and Afib is well controlled on Cardizem. His cholesterol remains borderline controlled. He is currently a candidate for statin therapy but is currently declining using more medication. He will also try to exercise more to improve his cholesterol. His right knee stops him from walking so he will try to find something else to do for exercise. Pt continues to be followed by cardiology for h/o PAF    Respiratory ROS: negative for - shortness of breath  Cardiovascular ROS: negative for - chest pain    Current Outpatient Prescriptions   Medication Sig    dilTIAZem CD (CARDIZEM CD) 240 mg ER capsule TAKE 1 CAPSULE TWICE A DAY    aspirin 81 mg tablet Take 2 Tabs by mouth daily.  ZINC MTH/COPPER/SAW PALM/GNSG (PROSTATE HEALTH FORMULA PO) Take 1 Tab by mouth daily.  omega-3 fatty acids-vitamin e (FISH OIL) 1,000 mg Cap Take 1 Cap by mouth daily. No current facility-administered medications for this visit.           OBJECTIVE:  alert, well appearing, and in no distress  Visit Vitals    /54 (BP 1 Location: Left arm, BP Patient Position: Sitting)    Pulse 64    Temp 98.2 °F (36.8 °C) (Oral)    Resp 18    Ht 6' 2.5\" (1.892 m)    Wt 219 lb (99.3 kg)    SpO2 98%    BMI 27.74 kg/m2      well developed and well nourished  Neck - supple, no significant adenopathy  Chest - clear to auscultation, no wheezes, rales or rhonchi, symmetric air entry  Heart - normal rate, regular rhythm, normal S1, S2, no murmurs, rubs, clicks or gallops  Extremities - peripheral pulses normal, no pedal edema, no clubbing or cyanosis  Skin - normal coloration and turgor, no rashes, no suspicious skin lesions noted      Labs:   Lab Results   Component Value Date/Time    Cholesterol, total 165 07/20/2017 08:02 AM    HDL Cholesterol 50 07/20/2017 08:02 AM    LDL, calculated 99.2 07/20/2017 08:02 AM Triglyceride 79 07/20/2017 08:02 AM    CHOL/HDL Ratio 3.3 07/20/2017 08:02 AM     Lab Results   Component Value Date/Time    AST (SGOT) 17 07/20/2017 08:02 AM    Alk. phosphatase 83 07/20/2017 08:02 AM     Lab Results   Component Value Date/Time    GFR est AA >60 07/20/2017 08:02 AM    GFR est non-AA >60 07/20/2017 08:02 AM    Creatinine 1.13 07/20/2017 08:02 AM    BUN 14 07/20/2017 08:02 AM    Sodium 139 07/20/2017 08:02 AM    Potassium 4.2 07/20/2017 08:02 AM    Chloride 105 07/20/2017 08:02 AM    CO2 27 07/20/2017 08:02 AM      Lab Results   Component Value Date/Time    Glucose 95 07/20/2017 08:02 AM        Assessment/Plan      ICD-10-CM ICD-9-CM    1. Routine general medical examination at a health care facility Z00.00 V70.0    2. Screening for alcoholism Z13.89 V79.1    3. Encounter for immunization Z23 V03.89    4. Essential hypertension I10 401.9 Well controlled on Cardizem METABOLIC PANEL, COMPREHENSIVE   5. Dyslipidemia E78.5 272.4 Consider Heart Scan. Pt declines statin currently  LIPID PANEL                Follow-up Disposition:  Return in about 6 months (around 1/27/2018) for labs 1 week before. Reviewed plan of care. Patient has provided input and agrees with goals.

## 2017-10-26 ENCOUNTER — CLINICAL SUPPORT (OUTPATIENT)
Dept: FAMILY MEDICINE CLINIC | Age: 77
End: 2017-10-26

## 2017-10-26 DIAGNOSIS — Z23 ENCOUNTER FOR IMMUNIZATION: Primary | ICD-10-CM

## 2017-10-26 NOTE — MR AVS SNAPSHOT
Visit Information Date & Time Provider Department Dept. Phone Encounter #  
 10/26/2017 10:15 AM Rosetta Tapia[address] [place] [address] [id number] Your Appointments 12/28/2017  1:40 PM  
Follow Up with Katheryn Garza MD  
[facility] ([address]) Appt Note: 6 month f/up [place] [id number]  
[phone] Cecil Yi  
  
    
 1/18/2018  8:05 AM  
LAB with [facility] ([place]) Appt Note: lab [address] [facility] [address]  
[phone]  
  
   
 [address] [place] [facility]  
  
    
 3/6/2018 10:50 AM  
[facility] with [facility] ([facility]) Appt Note: psa  
 [address] 
[address] [place] [address]  
[address] [place] [address] 3/13/2018 10:00 AM  
Any with Kenisha Bahena MD  
[facility] ([address] [place] [address]) Appt Note: 6 mon f/u w/ psa prior [address] [place] [address]  
[address] [place] [address] Upcoming Health Maintenance Date Due ZOSTER VACCINE AGE 60> 9/30/1999 Pneumococcal 65+ High/Highest Risk (2 of 2 - PPSV23) 9/20/2016 INFLUENZA AGE 9 TO ADULT 8/1/2017 MEDICARE YEARLY EXAM 7/28/2018 GLAUCOMA SCREENING Q2Y 9/8/2019 DTaP/Tdap/Td series (2 - Td) 10/27/2023 COLONOSCOPY 10/14/2024 Allergies as of 10/26/2017  Review Complete On: 9/26/2017 By: Kenisha Bahena MD  
  
 Severity Noted Reaction Type Reactions Shellfish Containing Products    Swelling Current Immunizations  Reviewed on 11/7/2016 Name Date Influenza High Dose Vaccine PF  Incomplete Influenza Vaccine 11/19/2014, 11/12/2013 Influenza Vaccine (Quad) PF 11/7/2016, 10/23/2015 Influenza Vaccine Split 11/23/2011, 12/15/2010 Influenza Vaccine Whole 12/9/2012 Pneumococcal Conjugate (PCV-13) 7/26/2016 TD Vaccine 5/10/2004 Tdap 10/27/2013 10:12 AM  
 ZZZ-RETIRED (DO NOT USE) Pneumococcal Vaccine (Unspecified Type) 5/10/2004 Not reviewed this visit You Were Diagnosed With   
  
 Codes Comments Encounter for immunization    -  Primary ICD-10-CM: D65 ICD-9-CM: V03.89 Vitals Smoking Status Former Smoker Preferred Pharmacy Pharmacy Name Phone [address], [facility] [phone] Your Updated Medication List  
  
   
This list is accurate as of: 10/26/17 10:41 AM.  Always use your most recent med list.  
  
  
  
  
 aspirin 81 mg tablet Take 2 Tabs by mouth daily. dilTIAZem  mg ER capsule Commonly known as:  CARDIZEM CD  
TAKE 1 CAPSULE TWICE A DAY  
  
 FISH OIL 1,000 mg Cap Generic drug:  omega-3 fatty acids-vitamin e Take 1 Cap by mouth daily. PROSTATE HEALTH FORMULA PO Take 1 Tab by mouth daily. We Performed the Following ADMIN INFLUENZA VIRUS VAC [ [facility]] INFLUENZA VIRUS VACCINE, HIGH DOSE SEASONAL, PRESERVATIVE FREE [27086 CPT(R)] Patient Instructions Vaccine Information Statement Influenza (Flu) Vaccine (Inactivated or Recombinant): What you need to know Many Vaccine Information Statements are available in Albanian and other languages. See www.immunize.org/vis Hojas de Información Sobre Vacunas están disponibles en Español y en muchos otros idiomas. Visite www.immunize.org/vis 1. Why get vaccinated? Influenza (flu) is a contagious disease that spreads around the [place] every year, usually between October and May. Flu is caused by influenza viruses, and is spread mainly by coughing, sneezing, and close contact. Anyone can get flu. Flu strikes suddenly and can last several days. Symptoms vary by age, but can include: 
 fever/chills  sore throat  muscle aches  fatigue  cough  headache  runny or stuffy nose Flu can also lead to pneumonia and blood infections, and cause diarrhea and seizures in children. If you have a medical condition, such as heart or lung disease, flu can make it worse. Flu is more dangerous for some people. Infants and young children, people 72years of age and older, pregnant women, and people with certain health conditions or a weakened immune system are at greatest risk. Each year thousands of people in the [facility] die from flu, and many more are hospitalized. Flu vaccine can: 
 keep you from getting flu, 
 make flu less severe if you do get it, and 
 keep you from spreading flu to your family and other people. 2. Inactivated and recombinant flu vaccines A dose of flu vaccine is recommended every flu season. Children 6 months through 6years of age may need two doses during the same flu season. Everyone else needs only one dose each flu season. Some inactivated flu vaccines contain a very small amount of a mercury-based preservative called thimerosal. Studies have not shown thimerosal in vaccines to be harmful, but flu vaccines that do not contain thimerosal are available. There is no live flu virus in flu shots. They cannot cause the flu. There are many flu viruses, and they are always changing. Each year a new flu vaccine is made to protect against three or four viruses that are likely to cause disease in the upcoming flu season. But even when the vaccine doesnt exactly match these viruses, it may still provide some protection Flu vaccine cannot prevent: 
 flu that is caused by a virus not covered by the vaccine, or 
 illnesses that look like flu but are not.  
 
It takes about 2 weeks for protection to develop after vaccination, and protection lasts through the flu season. 3. Some people should not get this vaccine Tell the person who is giving you the vaccine:  If you have any severe, life-threatening allergies. If you ever had a life-threatening allergic reaction after a dose of flu vaccine, or have a severe allergy to any part of this vaccine, you may be advised not to get vaccinated. Most, but not all, types of flu vaccine contain a small amount of egg protein.  If you ever had Guillain-Barré Syndrome (also called GBS). Some people with a history of GBS should not get this vaccine. This should be discussed with your doctor.  If you are not feeling well. It is usually okay to get flu vaccine when you have a mild illness, but you might be asked to come back when you feel better. 4. Risks of a vaccine reaction With any medicine, including vaccines, there is a chance of reactions. These are usually mild and go away on their own, but serious reactions are also possible. Most people who get a flu shot do not have any problems with it. Minor problems following a flu shot include:  
 soreness, redness, or swelling where the shot was given  hoarseness  sore, red or itchy eyes  cough  fever  aches  headache  itching  fatigue If these problems occur, they usually begin soon after the shot and last 1 or 2 days. More serious problems following a flu shot can include the following:  There may be a small increased risk of Guillain-Barré Syndrome (GBS) after inactivated flu vaccine. This risk has been estimated at 1 or 2 additional cases per million people vaccinated. This is much lower than the risk of severe complications from flu, which can be prevented by flu vaccine.    
 
 Young children who get the flu shot along with pneumococcal vaccine (PCV13) and/or DTaP vaccine at the same time might be slightly more likely to have a seizure caused by fever. Ask your doctor for more information. Tell your doctor if a child who is getting flu vaccine has ever had a seizure. Problems that could happen after any injected vaccine:  People sometimes faint after a medical procedure, including vaccination. Sitting or lying down for about 15 minutes can help prevent fainting, and injuries caused by a fall. Tell your doctor if you feel dizzy, or have vision changes or ringing in the ears.  Some people get severe pain in the shoulder and have difficulty moving the arm where a shot was given. This happens very rarely.  Any medication can cause a severe allergic reaction. Such reactions from a vaccine are very rare, estimated at about 1 in a million doses, and would happen within a few minutes to a few hours after the vaccination. As with any medicine, there is a very remote chance of a vaccine causing a serious injury or death. The safety of vaccines is always being monitored. For more information, visit: www.cdc.gov/vaccinesafety/ 
 
 
The [facility] Vaccine Injury Compensation Program (VICP) is a federal program that was created to compensate people who may have been injured by certain vaccines. Persons who believe they may have been injured by a vaccine can learn about the program and about filing a claim by calling 5-313.929.8377 or visiting the [employer] website at www.[facility].gov/vaccinecompensation. There is a time limit to file a claim for compensation. 7. How can I learn more?  Ask your healthcare provider. He or she can give you the vaccine package insert or suggest other sources of information.  Call your local or state health department.  Contact the Centers for Disease Control and Prevention (CDC): 
- Call 6-203.583.3306 (1-800-CDC-INFO) or 
- Visit CDCs website at www.cdc.gov/flu Vaccine Information Statement Inactivated Influenza Vaccine 8/7/2015 
42 SONAM Jung 069HA-[id number] Department of Health and [employer] Centers for Disease Control and Prevention Office Use Only [facility]! Dear David Ybarra: Thank you for requesting a [employer] account. Our records indicate that you already have an active [employer] account. You can access your account anytime at [email or web address] Did you know that you can access your hospital and ER discharge instructions at any time in [employer]? You can also review all of your test results from your hospital stay or ER visit. Additional Information If you have questions, please visit the Frequently Asked Questions section of the [employer] website at [email or web address] Remember, [employer] is NOT to be used for urgent needs. For medical emergencies, dial 911. Now available from your iPhone and Android! Please provide this summary of care documentation to your next provider. Your primary care clinician is listed as JOSEP WOODALL.  If you have any questions after today's visit, please call [phone].

## 2017-10-26 NOTE — PROGRESS NOTES
Pt given High dose Flu vaccine in R deltoid per verbral read back order Dr [place]. Pt tolerated procedure w/o reaction.

## 2017-10-26 NOTE — PATIENT INSTRUCTIONS
Vaccine Information Statement    Influenza (Flu) Vaccine (Inactivated or Recombinant): What you need to know    Many Vaccine Information Statements are available in Yoruba and other languages. See www.immunize.org/vis  Hojas de Información Sobre Vacunas están disponibles en Español y en muchos otros idiomas. Visite www.immunize.org/vis    1. Why get vaccinated? Influenza (flu) is a contagious disease that spreads around the [place] every year, usually between October and May. Flu is caused by influenza viruses, and is spread mainly by coughing, sneezing, and close contact. Anyone can get flu. Flu strikes suddenly and can last several days. Symptoms vary by age, but can include:   fever/chills   sore throat   muscle aches   fatigue   cough   headache    runny or stuffy nose    Flu can also lead to pneumonia and blood infections, and cause diarrhea and seizures in children. If you have a medical condition, such as heart or lung disease, flu can make it worse. Flu is more dangerous for some people. Infants and young children, people 72years of age and older, pregnant women, and people with certain health conditions or a weakened immune system are at greatest risk. Each year thousands of people in the [facility] die from flu, and many more are hospitalized. Flu vaccine can:   keep you from getting flu,   make flu less severe if you do get it, and   keep you from spreading flu to your family and other people. 2. Inactivated and recombinant flu vaccines    A dose of flu vaccine is recommended every flu season. Children 6 months through 6years of age may need two doses during the same flu season. Everyone else needs only one dose each flu season.        Some inactivated flu vaccines contain a very small amount of a mercury-based preservative called thimerosal. Studies have not shown thimerosal in vaccines to be harmful, but flu vaccines that do not contain thimerosal are available. There is no live flu virus in flu shots. They cannot cause the flu. There are many flu viruses, and they are always changing. Each year a new flu vaccine is made to protect against three or four viruses that are likely to cause disease in the upcoming flu season. But even when the vaccine doesnt exactly match these viruses, it may still provide some protection    Flu vaccine cannot prevent:   flu that is caused by a virus not covered by the vaccine, or   illnesses that look like flu but are not. It takes about 2 weeks for protection to develop after vaccination, and protection lasts through the flu season. 3. Some people should not get this vaccine    Tell the person who is giving you the vaccine:     If you have any severe, life-threatening allergies. If you ever had a life-threatening allergic reaction after a dose of flu vaccine, or have a severe allergy to any part of this vaccine, you may be advised not to get vaccinated. Most, but not all, types of flu vaccine contain a small amount of egg protein.  If you ever had Guillain-Barré Syndrome (also called GBS). Some people with a history of GBS should not get this vaccine. This should be discussed with your doctor.  If you are not feeling well. It is usually okay to get flu vaccine when you have a mild illness, but you might be asked to come back when you feel better. 4. Risks of a vaccine reaction    With any medicine, including vaccines, there is a chance of reactions. These are usually mild and go away on their own, but serious reactions are also possible. Most people who get a flu shot do not have any problems with it.      Minor problems following a flu shot include:    soreness, redness, or swelling where the shot was given     hoarseness   sore, red or itchy eyes   cough   fever   aches   headache   itching   fatigue  If these problems occur, they usually begin soon after the shot and last 1 or 2 days. More serious problems following a flu shot can include the following:     There may be a small increased risk of Guillain-Barré Syndrome (GBS) after inactivated flu vaccine. This risk has been estimated at 1 or 2 additional cases per million people vaccinated. This is much lower than the risk of severe complications from flu, which can be prevented by flu vaccine.  Young children who get the flu shot along with pneumococcal vaccine (PCV13) and/or DTaP vaccine at the same time might be slightly more likely to have a seizure caused by fever. Ask your doctor for more information. Tell your doctor if a child who is getting flu vaccine has ever had a seizure. Problems that could happen after any injected vaccine:      People sometimes faint after a medical procedure, including vaccination. Sitting or lying down for about 15 minutes can help prevent fainting, and injuries caused by a fall. Tell your doctor if you feel dizzy, or have vision changes or ringing in the ears.  Some people get severe pain in the shoulder and have difficulty moving the arm where a shot was given. This happens very rarely.  Any medication can cause a severe allergic reaction. Such reactions from a vaccine are very rare, estimated at about 1 in a million doses, and would happen within a few minutes to a few hours after the vaccination. As with any medicine, there is a very remote chance of a vaccine causing a serious injury or death. The safety of vaccines is always being monitored. For more information, visit: www.cdc.gov/vaccinesafety/    5. What if there is a serious reaction? What should I look for?  Look for anything that concerns you, such as signs of a severe allergic reaction, very high fever, or unusual behavior.     Signs of a severe allergic reaction can include hives, swelling of the face and throat, difficulty breathing, a fast heartbeat, dizziness, and weakness - usually within a few minutes to a few hours after the vaccination. What should I do?  If you think it is a severe allergic reaction or other emergency that cant wait, call 9-1-1 and get the person to the nearest hospital. Otherwise, call your doctor.  Reactions should be reported to the Vaccine Adverse Event Reporting System (VAERS). Your doctor should file this report, or you can do it yourself through  the VAERS web site at www.vaers. [facility].gov, or by calling 5-571.235.5522. VAERS does not give medical advice. 6. The National Vaccine Injury Compensation Program    The [facility] Vaccine Injury Compensation Program (VICP) is a federal program that was created to compensate people who may have been injured by certain vaccines. Persons who believe they may have been injured by a vaccine can learn about the program and about filing a claim by calling 3-692.284.9672 or visiting the [employer] website at www.[facility].gov/vaccinecompensation. There is a time limit to file a claim for compensation. 7. How can I learn more?  Ask your healthcare provider. He or she can give you the vaccine package insert or suggest other sources of information.  Call your local or state health department.  Contact the Centers for Disease Control and Prevention (CDC):  - Call 3-349.546.3435 (1-800-CDC-INFO) or  - Visit CDCs website at www.cdc.gov/flu    Vaccine Information Statement   Inactivated Influenza Vaccine   8/7/2015  42 SONAM Downing [id number]    Department of Health and Human Services  Centers for Disease Control and Prevention    Office Use Only

## 2017-12-28 ENCOUNTER — OFFICE VISIT (OUTPATIENT)
Dept: CARDIOLOGY CLINIC | Age: 77
End: 2017-12-28

## 2017-12-28 VITALS
WEIGHT: 219.8 LBS | DIASTOLIC BLOOD PRESSURE: 80 MMHG | SYSTOLIC BLOOD PRESSURE: 140 MMHG | BODY MASS INDEX: 27.33 KG/M2 | OXYGEN SATURATION: 97 % | HEIGHT: 75 IN | HEART RATE: 71 BPM

## 2017-12-28 DIAGNOSIS — I10 ESSENTIAL HYPERTENSION: ICD-10-CM

## 2017-12-28 DIAGNOSIS — I49.5 SICK SINUS SYNDROME (HCC): ICD-10-CM

## 2017-12-28 DIAGNOSIS — I48.0 PAROXYSMAL ATRIAL FIBRILLATION (HCC): Primary | ICD-10-CM

## 2017-12-28 DIAGNOSIS — E78.5 DYSLIPIDEMIA: ICD-10-CM

## 2017-12-28 NOTE — MR AVS SNAPSHOT
Visit Information Date & Time Provider Department Dept. Phone Encounter #  
 12/28/2017  1:40 PM Beti Henderson MD [facility] [address] [id number] Your Appointments 12/28/2017  1:40 PM  
Follow Up with Beti Henderson MD  
[facility] ([address]) Appt Note: 6 month f/up [place] [address] [address] [address]  
[phone] Cecil Yi  
  
    
 1/18/2018  8:05 AM  
LAB with [facility] ([place]) Appt Note: lab [address] [place] [address] [address] [address]  
[phone]  
  
   
 [address]  
  
    
 3/6/2018 10:50 AM  
Nurse Visit with [facility] ([facility]) Appt Note: psa  
 [address] 
[address] [place] [address]  
[address] [place] [address] 3/13/2018 10:00 AM  
Any with Lelia Nichole MD  
[facility] ([address] [place] [address]) Appt Note: 6 mon f/u w/ psa prior [address] [place] [address]  
[address] [place] [address] Upcoming Health Maintenance Date Due ZOSTER VACCINE AGE 60> 9/30/1999 Pneumococcal 65+ High/Highest Risk (2 of 2 - PPSV23) 9/20/2016 MEDICARE YEARLY EXAM 7/28/2018 GLAUCOMA SCREENING Q2Y 9/8/2019 DTaP/Tdap/Td series (2 - Td) 10/27/2023 COLONOSCOPY 10/14/2024 Allergies as of 12/28/2017  Review Complete On: 9/26/2017 By: Lelia Nichole MD  
  
 Severity Noted Reaction Type Reactions Shellfish Containing Products    Swelling Current Immunizations  Reviewed on 10/26/2017 Name Date Influenza High Dose Vaccine PF 10/26/2017 Influenza Vaccine 11/19/2014, 11/12/2013 Influenza Vaccine (Quad) PF 11/7/2016, 10/23/2015 Influenza Vaccine Split 11/23/2011, 12/15/2010 Influenza Vaccine Whole 12/9/2012 Pneumococcal Conjugate (PCV-13) 7/26/2016 TD Vaccine 5/10/2004 Tdap 10/27/2013 10:12 AM  
 ZZZ-RETIRED (DO NOT USE) Pneumococcal Vaccine (Unspecified Type) 5/10/2004 Not reviewed this visit You Were Diagnosed With   
  
 Codes Comments Paroxysmal atrial fibrillation (HCC)    -  Primary ICD-10-CM: I48.0 ICD-9-CM: 427.31 Vitals BP Pulse Height(growth percentile) Weight(growth percentile) SpO2 BMI  
 140/80 71 6' 2.5\" (1.892 m) 219 lb 12.8 oz (99.7 kg) 97% 27.84 kg/m2 Smoking Status Former Smoker BMI and BSA Data Body Mass Index Body Surface Area  
 27.84 kg/m 2 2.29 m 2 Preferred Pharmacy Pharmacy Name Phone [address], [facility] [phone] Your Updated Medication List  
  
   
This list is accurate as of: 12/28/17 12:26 PM.  Always use your most recent med list.  
  
  
  
  
 aspirin 81 mg tablet Take 2 Tabs by mouth daily. dilTIAZem  mg ER capsule Commonly known as:  CARDIZEM CD  
TAKE 1 CAPSULE TWICE A DAY  
  
 FISH OIL 1,000 mg Cap Generic drug:  omega-3 fatty acids-vitamin e Take 1 Cap by mouth daily. PROSTATE HEALTH FORMULA PO Take 1 Tab by mouth daily. We Performed the Following AMB POC EKG ROUTINE W/ 12 LEADS, INTER & REP [50900 CPT(R)] Introducing Sid Cleary! Dear Sylvain Sue: Thank you for requesting a [employer] account. Our records indicate that you already have an active [employer] account. You can access your account anytime at [email or web address] Did you know that you can access your hospital and ER discharge instructions at any time in [employer]? You can also review all of your test results from your hospital stay or ER visit. Additional Information If you have questions, please visit the Frequently Asked Questions section of the [employer] website at [email or web address] Remember, [employer] is NOT to be used for urgent needs. For medical emergencies, dial 911. Now available from your iPhone and Android! Please provide this summary of care documentation to your next provider. Your primary care clinician is listed as JOSEP WOODALL. If you have any questions after today's visit, please call [phone].

## 2017-12-28 NOTE — PROGRESS NOTES
1. Have you been to the ER, urgent care clinic since your last visit? Hospitalized since your last visit? No    2. Have you seen or consulted any other health care providers outside of the [address] [place] [address] since your last visit? Include any pap smears or colon screening.  No

## 2017-12-28 NOTE — PROGRESS NOTES
HISTORY OF PRESENT ILLNESS  Jyoti Gonzalez is a 65 y.o. male. Follow-up   Pertinent negatives include no chest pain, no abdominal pain, no headaches and no shortness of breath. Patient presents for a scheduled followup visit. He has a past medical history significant for paroxysmal atrial fibrillation, hypertension, and sick sinus syndrome, status post dual-chamber permanent pacemaker in March 2011. The patient was last seen in the office 6 months ago. Since last visit, the has been feeling well. He denies any recurrent chest pain or pressure. No palpitations, dizziness or syncope. No major change in his activity level. No exertional dyspnea, orthopnea, or PND. No leg swelling or claudication. Past Medical History:   Diagnosis Date    Atrial fibrillation (HCC)     CHADS 0  (-CHF, -HTN, -AGE, -DM, -CVA)    Cardiac echocardiogram 06/29/2010    EF 70-75%. Mild conc LVh. Gr 1 DDfx. Mild DELMY.  Cardiac Holter monitor, normal 06/28/2010    Normal Holter study.  Cardiac nuclear imaging test 11/22/2010    Sm area of apical ischemia and inferolateral scar, both possibly apical thinning. No WMA. EF 60%.  Impotence of organic origin     Pacemaker     3/11  MEDTRONIC    Personal history of malignant neoplasm of prostate     T1a, [place] 6 (3+3)     [facility]state [facility] ([facility] [address]     PUD (peptic ulcer disease)     Sick sinus syndrome ([facility] [address]     status post implantation of Medtronic dual-chamber permanent pacemaker 3/22/11.  Unspecified essential hypertension     Venereal disease       Current Outpatient Prescriptions   Medication Sig Dispense Refill    dilTIAZem CD (CARDIZEM CD) 240 mg ER capsule TAKE 1 CAPSULE TWICE A  Cap 3    aspirin 81 mg tablet Take 2 Tabs by mouth daily. 1 Tab 0    ZINC MTH/COPPER/SAW PALM/GNSG (PROSTATE HEALTH FORMULA PO) Take 1 Tab by mouth daily.  omega-3 fatty acids-vitamin e (FISH OIL) 1,000 mg Cap Take 1 Cap by mouth daily.         Allergies Allergen Reactions    Shellfish Containing Products Swelling      Social History   Substance Use Topics    Smoking status: Former Smoker     Packs/day: 0.25     Years: 1.00     Quit date: 4/8/1981    Smokeless tobacco: Never Used    Alcohol use No         Review of Systems   Constitutional: Negative for chills, fever and weight loss. HENT: Negative for nosebleeds. Eyes: Negative for blurred vision and double vision. Respiratory: Negative for cough, shortness of breath and wheezing. Cardiovascular: Negative for chest pain, palpitations, orthopnea, claudication, leg swelling and PND. Gastrointestinal: Negative for abdominal pain, heartburn, nausea and vomiting. Genitourinary: Negative for dysuria and hematuria. Musculoskeletal: Negative for falls and myalgias. Skin: Negative for rash. Neurological: Negative for dizziness, focal weakness and headaches. Endo/Heme/Allergies: Does not bruise/bleed easily. Psychiatric/Behavioral: Negative for substance abuse. Visit Vitals    /80    Pulse 71    Ht 6' 2.5\" (1.892 m)    Wt 99.7 kg (219 lb 12.8 oz)    SpO2 97%    BMI 27.84 kg/m2      Physical Exam   Constitutional: He is oriented to person, place, and time. He appears well-developed and well-nourished. HENT:   Head: Normocephalic and atraumatic. Eyes: Conjunctivae are normal.   Neck: Neck supple. No JVD present. Carotid bruit is not present. Cardiovascular: Normal rate, regular rhythm, S1 normal, S2 normal and normal pulses. Exam reveals no gallop and no S3. No murmur heard. Pulmonary/Chest: Breath sounds normal. He has no wheezes. He has no rales. Abdominal: Soft. Bowel sounds are normal. There is no tenderness. Musculoskeletal: He exhibits no edema. Neurological: He is alert and oriented to person, place, and time. Skin: Skin is warm and dry.      EKG: Atrial paced rhythm, intrinsic QRS conduction with an incomplete right bundle branch block, no ST or T wave abnormalities. No change compared to the previous EKG. Pacemaker interrogation. Battery life estimated at 4 years. No significant arrhythmias identified. Pacing in the atrium 98%, and in the ventricle 0%. Scanned document for details. ASSESSMENT and PLAN    Hypertension. Patient's blood pressure now appears well-controlled on Cardizem as monotherapy. Paroxysmal atrial fibrillation. No significant episodes of atrial fibrillation over the past  to years. I would continue his regimen of Cardizem and aspirin for now. No need for anticoagulation unless he develops prolonged episodes of atrial fibrillation. Sick sinus syndrome. Status post dual-chamber permanent pacemaker. Normal device function on interrogation. The patient continues to pace the majority of the time in the atrium and very little in the ventricle. Battery life estimated at 4 years. Dyslipidemia. This is only been mildly elevated in the past.  This is followed closely by his PCP. The patient works on lifestyle modification. Followup in 6 months, Sooner if needed.

## 2018-01-18 ENCOUNTER — HOSPITAL ENCOUNTER (OUTPATIENT)
Dept: LAB | Age: 78
Discharge: HOME | End: 2018-01-18
Payer: MEDICARE

## 2018-01-18 DIAGNOSIS — I10 ESSENTIAL HYPERTENSION: ICD-10-CM

## 2018-01-18 DIAGNOSIS — E78.5 DYSLIPIDEMIA: ICD-10-CM

## 2018-01-18 LAB
ALBUMIN SERPL-MCNC: 4 G/DL (ref 3.4–5)
ALBUMIN/GLOB SERPL: 1.3 {RATIO} (ref 0.8–1.7)
ALP SERPL-CCNC: 84 U/L (ref 45–117)
ALT SERPL-CCNC: 25 U/L (ref 16–61)
ANION GAP SERPL CALC-SCNC: 6 MMOL/L (ref 3–18)
AST SERPL-CCNC: 24 U/L (ref 15–37)
BILIRUB SERPL-MCNC: 0.6 MG/DL (ref 0.2–1)
BUN SERPL-MCNC: 17 MG/DL (ref 7–18)
BUN/CREAT SERPL: 14 (ref 12–20)
CALCIUM SERPL-MCNC: 8.4 MG/DL (ref 8.5–10.1)
CHLORIDE SERPL-SCNC: 108 MMOL/L (ref 100–108)
CHOLEST SERPL-MCNC: 153 MG/DL
CO2 SERPL-SCNC: 27 MMOL/L (ref 21–32)
CREAT SERPL-MCNC: 1.18 MG/DL (ref 0.6–1.3)
GLOBULIN SER CALC-MCNC: 3 G/DL (ref 2–4)
GLUCOSE SERPL-MCNC: 101 MG/DL (ref 74–99)
HDLC SERPL-MCNC: 50 MG/DL (ref 40–60)
HDLC SERPL: 3.1 {RATIO} (ref 0–5)
LDLC SERPL CALC-MCNC: 94.2 MG/DL (ref 0–100)
LIPID PROFILE,FLP: NORMAL
POTASSIUM SERPL-SCNC: 4.4 MMOL/L (ref 3.5–5.5)
PROT SERPL-MCNC: 7 G/DL (ref 6.4–8.2)
SODIUM SERPL-SCNC: 141 MMOL/L (ref 136–145)
TRIGL SERPL-MCNC: 44 MG/DL (ref ?–150)
VLDLC SERPL CALC-MCNC: 8.8 MG/DL

## 2018-01-18 PROCEDURE — 36415 COLL VENOUS BLD VENIPUNCTURE: CPT | Performed by: INTERNAL MEDICINE

## 2018-01-18 PROCEDURE — 80053 COMPREHEN METABOLIC PANEL: CPT | Performed by: INTERNAL MEDICINE

## 2018-01-18 PROCEDURE — 80061 LIPID PANEL: CPT | Performed by: INTERNAL MEDICINE

## 2018-01-25 ENCOUNTER — OFFICE VISIT (OUTPATIENT)
Dept: FAMILY MEDICINE CLINIC | Age: 78
End: 2018-01-25

## 2018-01-25 VITALS
HEIGHT: 75 IN | WEIGHT: 218 LBS | DIASTOLIC BLOOD PRESSURE: 69 MMHG | RESPIRATION RATE: 18 BRPM | TEMPERATURE: 97.6 F | OXYGEN SATURATION: 96 % | SYSTOLIC BLOOD PRESSURE: 115 MMHG | HEART RATE: 71 BPM | BODY MASS INDEX: 27.1 KG/M2

## 2018-01-25 DIAGNOSIS — I48.0 PAROXYSMAL ATRIAL FIBRILLATION (HCC): ICD-10-CM

## 2018-01-25 DIAGNOSIS — I10 ESSENTIAL HYPERTENSION: Primary | ICD-10-CM

## 2018-01-25 RX ORDER — BRIMONIDINE TARTRATE 2 MG/ML
1 SOLUTION/ DROPS OPHTHALMIC 2 TIMES DAILY
COMMUNITY
Start: 2018-01-12

## 2018-01-25 RX ORDER — LATANOPROST 50 UG/ML
1 SOLUTION/ DROPS OPHTHALMIC DAILY
COMMUNITY
Start: 2017-12-27

## 2018-01-25 NOTE — MR AVS SNAPSHOT
[facility] 
 
 
 [address] [address] [facility] [id number] [phone] Patient: Jose Eduardo Simon MRN: [id number] Visit Information Date & Time Provider Department Dept. Phone Encounter #  
 1/25/2018  8:00 AM Capri Guo [address] [place] [address] [phone] [id number] Follow-up Instructions Return in about 6 months (around 7/25/2018) for OV, and Medicare Wellness Visit, labs 1 week before. Your Appointments 3/6/2018 10:50 AM  
Nurse Visit with [facility] WB NURSE [facility] ([address] [place] [address] Road) Appt Note: psa  
 [address] 
[address] [place] [address]  
[address] [facility] [address] [facility] [address]  
  
    
 6/25/2018 11:20 AM  
Follow Up with Tanner Shabazz MD  
[facility] ([facility]) Appt Note: 6 month f/up [place] [address] [address] [id number] [phone] [address] [facility] [address] 3/13/2018 10:00 AM  
Any with Keyur Avelar MD  
[facility] ([address] [place] [address]) Appt Note: 6 mon f/u w/ psa prior [address] [place] [address]  
[address] [facility] [address] [facility] [address] [place] [address] Upcoming Health Maintenance Date Due ZOSTER VACCINE AGE 60> 9/30/1999 Pneumococcal 65+ High/Highest Risk (2 of 2 - PPSV23) 9/20/2016 MEDICARE YEARLY EXAM 7/28/2018 GLAUCOMA SCREENING Q2Y 9/8/2019 DTaP/Tdap/Td series (2 - Td) 10/27/2023 COLONOSCOPY 10/14/2024 Allergies as of 1/25/2018  Review Complete On: 1/25/2018 By: Capri Guo MD  
  
 Severity Noted Reaction Type Reactions Shellfish Containing Products    Swelling Current Immunizations  Reviewed on 10/26/2017 Name Date Influenza High Dose Vaccine PF 10/26/2017 Influenza Vaccine 11/19/2014, 11/12/2013 Influenza Vaccine (Quad) PF 11/7/2016, 10/23/2015 Influenza Vaccine Split 11/23/2011, 12/15/2010 Influenza Vaccine Whole 12/9/2012 Pneumococcal Conjugate (PCV-13) 7/26/2016 TD Vaccine 5/10/2004 Tdap 10/27/2013 10:12 AM  
 ZZZ-RETIRED (DO NOT USE) Pneumococcal Vaccine (Unspecified Type) 5/10/2004 Not reviewed this visit You Were Diagnosed With   
  
 Codes Comments Essential hypertension    -  Primary ICD-10-CM: I10 
ICD-9-CM: 401.9 Paroxysmal atrial fibrillation (HCC)     ICD-10-CM: I48.0 ICD-9-CM: 427.31 Vitals BP Pulse Temp Resp Height(growth percentile) Weight(growth percentile)  
 115/69 (BP 1 Location: Left arm, BP Patient Position: Sitting) 71 97.6 °F (36.4 °C) (Oral) 18 6' 2.5\" (1.892 m) 218 lb (98.9 kg) SpO2 BMI Smoking Status 96% 27.62 kg/m2 Former Smoker Vitals History BMI and BSA Data Body Mass Index Body Surface Area  
 27.62 kg/m 2 2.28 m 2 Preferred Pharmacy Pharmacy Name Phone [address] [facility] [phone] Your Updated Medication List  
  
   
This list is accurate as of: 1/25/18  8:34 AM.  Always use your most recent med list.  
  
  
  
  
 aspirin 81 mg tablet Take 2 Tabs by mouth daily. brimonidine 0.2 % ophthalmic solution Commonly known as:  Brandon Steward Administer 1 Drop to both eyes two (2) times a day. dilTIAZem  mg ER capsule Commonly known as:  CARDIZEM CD  
TAKE 1 CAPSULE TWICE A DAY  
  
 FISH OIL 1,000 mg Cap Generic drug:  omega-3 fatty acids-vitamin e Take 1 Cap by mouth daily. latanoprost 0.005 % ophthalmic solution Commonly known as:  Goldie Cardoso Administer 1 Drop to both eyes daily. PROSTATE HEALTH FORMULA PO Take 1 Tab by mouth daily. Follow-up Instructions Return in about 6 months (around 7/25/2018) for OV, and Medicare Wellness Visit, labs 1 week before. Patient Instructions High Blood Pressure: Care Instructions Your Care Instructions If your blood pressure is usually above 140/90, you have high blood pressure, or hypertension. That means the top number is 140 or higher or the bottom number is 90 or higher, or both. Despite what a lot of people think, high blood pressure usually doesn't cause headaches or make you feel dizzy or lightheaded. It usually has no symptoms. But it does increase your risk for heart attack, stroke, and kidney or eye damage. The higher your blood pressure, the more your risk increases. Your doctor will give you a goal for your blood pressure. Your goal will be based on your health and your age. An example of a goal is to keep your blood pressure below 140/90. Lifestyle changes, such as eating healthy and being active, are always important to help lower blood pressure. You might also take medicine to reach your blood pressure goal. 
Follow-up care is a key part of your treatment and safety. Be sure to make and go to all appointments, and call your doctor if you are having problems. It's also a good idea to know your test results and keep a list of the medicines you take. How can you care for yourself at home? Medical treatment · If you stop taking your medicine, your blood pressure will go back up. You may take one or more types of medicine to lower your blood pressure. Be safe with medicines. Take your medicine exactly as prescribed. Call your doctor if you think you are having a problem with your medicine. · Talk to your doctor before you start taking aspirin every day. Aspirin can help certain people lower their risk of a heart attack or stroke. But taking aspirin isn't right for everyone, because it can cause serious bleeding. · See your doctor regularly. You may need to see the doctor more often at first or until your blood pressure comes down.  
· If you are taking blood pressure medicine, talk to your doctor before you take decongestants or anti-inflammatory medicine, such as ibuprofen. Some of these medicines can raise blood pressure. · Learn how to check your blood pressure at home. Lifestyle changes · Stay at a healthy weight. This is especially important if you put on weight around the waist. Losing even 10 pounds can help you lower your blood pressure. · If your doctor recommends it, get more exercise. Walking is a good choice. Bit by bit, increase the amount you walk every day. Try for at least 30 minutes on most days of the week. You also may want to swim, bike, or do other activities. · Avoid or limit alcohol. Talk to your doctor about whether you can drink any alcohol. · Try to limit how much sodium you eat to less than 2,300 milligrams (mg) a day. Your doctor may ask you to try to eat less than 1,500 mg a day. · Eat plenty of fruits (such as bananas and oranges), vegetables, legumes, whole grains, and low-fat dairy products. · Lower the amount of saturated fat in your diet. Saturated fat is found in animal products such as milk, cheese, and meat. Limiting these foods may help you lose weight and also lower your risk for heart disease. · Do not smoke. Smoking increases your risk for heart attack and stroke. If you need help quitting, talk to your doctor about stop-smoking programs and medicines. These can increase your chances of quitting for good. When should you call for help? Call 911 anytime you think you may need emergency care. This may mean having symptoms that suggest that your blood pressure is causing a serious heart or blood vessel problem. Your blood pressure may be over 180/110. ? For example, call 911 if: 
? · You have symptoms of a heart attack. These may include: ¨ Chest pain or pressure, or a strange feeling in the chest. 
¨ Sweating. ¨ Shortness of breath. ¨ Nausea or vomiting.  
¨ Pain, pressure, or a strange feeling in the back, neck, jaw, or upper belly or in one or both shoulders or arms. ¨ Lightheadedness or sudden weakness. ¨ A fast or irregular heartbeat. ? · You have symptoms of a stroke. These may include: 
¨ Sudden numbness, tingling, weakness, or loss of movement in your face, arm, or leg, especially on only one side of your body. ¨ Sudden vision changes. ¨ Sudden trouble speaking. ¨ Sudden confusion or trouble understanding simple statements. ¨ Sudden problems with walking or balance. ¨ A sudden, severe headache that is different from past headaches. ? · You have severe back or belly pain. ?Do not wait until your blood pressure comes down on its own. Get help right away. ?Call your doctor now or seek immediate care if: 
? · Your blood pressure is much higher than normal (such as 180/110 or higher), but you don't have symptoms. ? · You think high blood pressure is causing symptoms, such as: ¨ Severe headache. ¨ Blurry vision. ? Watch closely for changes in your health, and be sure to contact your doctor if: 
? · Your blood pressure measures 140/90 or higher at least 2 times. That means the top number is 140 or higher or the bottom number is 90 or higher, or both. ? · You think you may be having side effects from your blood pressure medicine. ? · Your blood pressure is usually normal, but it goes above normal at least 2 times. Where can you learn more? Go to http://lachelle-gi.info/. Enter F602 in the search box to learn more about \"High Blood Pressure: Care Instructions. \" Current as of: September 21, 2016 Content Version: 11.4 © 1236-4023 [employer]. Care instructions adapted under license by [employer] and some sugar (which disclaims liability or warranty for this information). If you have questions about a medical condition or this instruction, always ask your healthcare professional. [address] any warranty or liability for your use of this information. Introducing [facility]! Dear Saintclair Laurence: Thank you for requesting a [employer] account. Our records indicate that you already have an active [employer] account. You can access your account anytime at [email or web address] Did you know that you can access your hospital and ER discharge instructions at any time in [employer]? You can also review all of your test results from your hospital stay or ER visit. Additional Information If you have questions, please visit the Frequently Asked Questions section of the [employer] website at [email or web address] Remember, [employer] is NOT to be used for urgent needs. For medical emergencies, dial 911. Now available from your iPhone and Android! Please provide this summary of care documentation to your next provider. Your primary care clinician is listed as JOSEP WOODALL. If you have any questions after today's visit, please call [phone].

## 2018-01-25 NOTE — PROGRESS NOTES
Patient is in the office today for 6 months follow up. 1. Have you been to the ER, urgent care clinic since your last visit? Hospitalized since your last visit? No    2. Have you seen or consulted any other health care providers outside of the [address] [place] [address] since your last visit? Include any pap smears or colon screening. yes.  [facility]

## 2018-01-25 NOTE — PATIENT INSTRUCTIONS
High Blood Pressure: Care Instructions  Your Care Instructions    If your blood pressure is usually above 140/90, you have high blood pressure, or hypertension. That means the top number is 140 or higher or the bottom number is 90 or higher, or both. Despite what a lot of people think, high blood pressure usually doesn't cause headaches or make you feel dizzy or lightheaded. It usually has no symptoms. But it does increase your risk for heart attack, stroke, and kidney or eye damage. The higher your blood pressure, the more your risk increases. Your doctor will give you a goal for your blood pressure. Your goal will be based on your health and your age. An example of a goal is to keep your blood pressure below 140/90. Lifestyle changes, such as eating healthy and being active, are always important to help lower blood pressure. You might also take medicine to reach your blood pressure goal.  Follow-up care is a key part of your treatment and safety. Be sure to make and go to all appointments, and call your doctor if you are having problems. It's also a good idea to know your test results and keep a list of the medicines you take. How can you care for yourself at home? Medical treatment  · If you stop taking your medicine, your blood pressure will go back up. You may take one or more types of medicine to lower your blood pressure. Be safe with medicines. Take your medicine exactly as prescribed. Call your doctor if you think you are having a problem with your medicine. · Talk to your doctor before you start taking aspirin every day. Aspirin can help certain people lower their risk of a heart attack or stroke. But taking aspirin isn't right for everyone, because it can cause serious bleeding. · See your doctor regularly. You may need to see the doctor more often at first or until your blood pressure comes down.   · If you are taking blood pressure medicine, talk to your doctor before you take decongestants or anti-inflammatory medicine, such as ibuprofen. Some of these medicines can raise blood pressure. · Learn how to check your blood pressure at home. Lifestyle changes  · Stay at a healthy weight. This is especially important if you put on weight around the waist. Losing even 10 pounds can help you lower your blood pressure. · If your doctor recommends it, get more exercise. Walking is a good choice. Bit by bit, increase the amount you walk every day. Try for at least 30 minutes on most days of the week. You also may want to swim, bike, or do other activities. · Avoid or limit alcohol. Talk to your doctor about whether you can drink any alcohol. · Try to limit how much sodium you eat to less than 2,300 milligrams (mg) a day. Your doctor may ask you to try to eat less than 1,500 mg a day. · Eat plenty of fruits (such as bananas and oranges), vegetables, legumes, whole grains, and low-fat dairy products. · Lower the amount of saturated fat in your diet. Saturated fat is found in animal products such as milk, cheese, and meat. Limiting these foods may help you lose weight and also lower your risk for heart disease. · Do not smoke. Smoking increases your risk for heart attack and stroke. If you need help quitting, talk to your doctor about stop-smoking programs and medicines. These can increase your chances of quitting for good. When should you call for help? Call 911 anytime you think you may need emergency care. This may mean having symptoms that suggest that your blood pressure is causing a serious heart or blood vessel problem. Your blood pressure may be over 180/110. ? For example, call 911 if:  ? · You have symptoms of a heart attack. These may include:  ¨ Chest pain or pressure, or a strange feeling in the chest.  ¨ Sweating. ¨ Shortness of breath. ¨ Nausea or vomiting.   ¨ Pain, pressure, or a strange feeling in the back, neck, jaw, or upper belly or in one or both shoulders or arms.  ¨ Lightheadedness or sudden weakness. ¨ A fast or irregular heartbeat. ? · You have symptoms of a stroke. These may include:  ¨ Sudden numbness, tingling, weakness, or loss of movement in your face, arm, or leg, especially on only one side of your body. ¨ Sudden vision changes. ¨ Sudden trouble speaking. ¨ Sudden confusion or trouble understanding simple statements. ¨ Sudden problems with walking or balance. ¨ A sudden, severe headache that is different from past headaches. ? · You have severe back or belly pain. ?Do not wait until your blood pressure comes down on its own. Get help right away. ?Call your doctor now or seek immediate care if:  ? · Your blood pressure is much higher than normal (such as 180/110 or higher), but you don't have symptoms. ? · You think high blood pressure is causing symptoms, such as:  ¨ Severe headache. ¨ Blurry vision. ? Watch closely for changes in your health, and be sure to contact your doctor if:  ? · Your blood pressure measures 140/90 or higher at least 2 times. That means the top number is 140 or higher or the bottom number is 90 or higher, or both. ? · You think you may be having side effects from your blood pressure medicine. ? · Your blood pressure is usually normal, but it goes above normal at least 2 times. Where can you learn more? Go to http://lachelle-gi.info/. Enter D554 in the search box to learn more about \"High Blood Pressure: Care Instructions. \"  Current as of: September 21, 2016  Content Version: 11.4  © 0442-9417 [employer]. Care instructions adapted under license by [employer] (which disclaims liability or warranty for this information). If you have questions about a medical condition or this instruction, always ask your healthcare professional. [address] [address] any warranty or liability for your use of this information.

## 2018-01-27 NOTE — PROGRESS NOTES
Marielena Sawyer is a 65 y.o.  male and presents with Hypertension (f/u) and Irregular Heart Beat      SUBJECTIVE:  Pt's HTN and Afib is well controlled on Cardizem. His cholesterol remains borderline controlled. He is currently a candidate for statin therapy but is currently declining using more medication. He will also try to exercise more to improve his cholesterol. His right knee stops him from walking so he will try to find something else to do for exercise. He is also taking care of his wife who has dementia which makes it difficult for him to do exercise. Pt continues to be followed by cardiology for h/o PAF    Respiratory ROS: negative for - shortness of breath  Cardiovascular ROS: negative for - chest pain    Current Outpatient Prescriptions   Medication Sig    latanoprost (XALATAN) 0.005 % ophthalmic solution Administer 1 Drop to both eyes daily.  brimonidine (ALPHAGAN) 0.2 % ophthalmic solution Administer 1 Drop to both eyes two (2) times a day.  dilTIAZem CD (CARDIZEM CD) 240 mg ER capsule TAKE 1 CAPSULE TWICE A DAY    aspirin 81 mg tablet Take 2 Tabs by mouth daily.  ZINC MTH/COPPER/SAW PALM/GNSG (PROSTATE HEALTH FORMULA PO) Take 1 Tab by mouth daily.  omega-3 fatty acids-vitamin e (FISH OIL) 1,000 mg Cap Take 1 Cap by mouth daily. No current facility-administered medications for this visit.           OBJECTIVE:  alert, well appearing, and in no distress  Visit Vitals    /69 (BP 1 Location: Left arm, BP Patient Position: Sitting)    Pulse 71    Temp 97.6 °F (36.4 °C) (Oral)    Resp 18    Ht 6' 2.5\" (1.892 m)    Wt 218 lb (98.9 kg)    SpO2 96%    BMI 27.62 kg/m2      well developed and well nourished  Neck - supple, no significant adenopathy  Chest - clear to auscultation, no wheezes, rales or rhonchi, symmetric air entry  Heart - normal rate, regular rhythm, normal S1, S2, no murmurs, rubs, clicks or gallops  Extremities - peripheral pulses normal, no pedal edema, no clubbing or cyanosis  Skin - normal coloration and turgor, no rashes, no suspicious skin lesions noted      Labs:   Lab Results   Component Value Date/Time    Cholesterol, total 153 01/18/2018 08:30 AM    HDL Cholesterol 50 01/18/2018 08:30 AM    LDL, calculated 94.2 01/18/2018 08:30 AM    Triglyceride 44 01/18/2018 08:30 AM    CHOL/HDL Ratio 3.1 01/18/2018 08:30 AM     Lab Results   Component Value Date/Time    AST (SGOT) 24 01/18/2018 08:30 AM    Alk. phosphatase 84 01/18/2018 08:30 AM     Lab Results   Component Value Date/Time    GFR est AA >60 01/18/2018 08:30 AM    GFR est non-AA 60 01/18/2018 08:30 AM    Creatinine 1.18 01/18/2018 08:30 AM    BUN 17 01/18/2018 08:30 AM    Sodium 141 01/18/2018 08:30 AM    Potassium 4.4 01/18/2018 08:30 AM    Chloride 108 01/18/2018 08:30 AM    CO2 27 01/18/2018 08:30 AM      Lab Results   Component Value Date/Time    Glucose 101 01/18/2018 08:30 AM        Assessment/Plan      ICD-10-CM ICD-9-CM    1. Essential hypertension I10 401.9 Well controlled on Caredizem METABOLIC PANEL, COMPREHENSIVE   2. Paroxysmal atrial fibrillation (HCC) I48.0 427.31 Stable on current meds. Pt is followed by Cardiology                 Follow-up Disposition:  Return in about 6 months (around 7/25/2018) for OV, and Medicare Wellness Visit, labs 1 week before. Reviewed plan of care. Patient has provided input and agrees with goals.

## 2018-06-25 ENCOUNTER — OFFICE VISIT (OUTPATIENT)
Dept: CARDIOLOGY CLINIC | Age: 78
End: 2018-06-25

## 2018-06-25 VITALS
HEART RATE: 87 BPM | OXYGEN SATURATION: 98 % | SYSTOLIC BLOOD PRESSURE: 132 MMHG | DIASTOLIC BLOOD PRESSURE: 72 MMHG | WEIGHT: 212 LBS | BODY MASS INDEX: 26.36 KG/M2 | HEIGHT: 75 IN

## 2018-06-25 DIAGNOSIS — I49.5 SICK SINUS SYNDROME (HCC): ICD-10-CM

## 2018-06-25 DIAGNOSIS — E78.5 DYSLIPIDEMIA: ICD-10-CM

## 2018-06-25 DIAGNOSIS — I10 ESSENTIAL HYPERTENSION: ICD-10-CM

## 2018-06-25 DIAGNOSIS — I48.0 PAROXYSMAL ATRIAL FIBRILLATION (HCC): Primary | ICD-10-CM

## 2018-06-25 DIAGNOSIS — R07.9 CHEST PAIN, UNSPECIFIED TYPE: ICD-10-CM

## 2018-06-25 NOTE — PROGRESS NOTES
1. Have you been to the ER, urgent care clinic since your last visit? Hospitalized since your last visit? No     2. Have you seen or consulted any other health care providers outside of the [facility] since your last visit? Include any pap smears or colon screening.  No

## 2018-06-25 NOTE — PROGRESS NOTES
HISTORY OF PRESENT ILLNESS  Oz Colindres is a 65 y.o. male. HPI      Patient presents for a scheduled followup visit. He has a past medical history significant for paroxysmal atrial fibrillation, hypertension, and sick sinus syndrome, status post dual-chamber permanent pacemaker in March 2011. The patient was last seen in the office 6 months ago. Since last visit, the patient reports 2 episodes of intermittent chest pain which occurred on the left side of his chest, nonradiating, only occurring at rest.  He describes a sharp stabbing pain no associated shortness of breath, dizziness or diaphoresis. He denies any major change in his activity level. He is the primary caregiver for his wife was has progressive Alzheimer's dementia. Past Medical History:   Diagnosis Date    Atrial fibrillation (HCC)     CHADS 0  (-CHF, -HTN, -AGE, -DM, -CVA)    Cardiac echocardiogram 06/29/2010    EF 70-75%. Mild conc LVh. Gr 1 DDfx. Mild DELMY.  Cardiac Holter monitor, normal 06/28/2010    Normal Holter study.  Cardiac nuclear imaging test 11/22/2010    Sm area of apical ischemia and inferolateral scar, both possibly apical thinning. No WMA. EF 60%.  Impotence of organic origin     Pacemaker     3/11  [employer]TR[employer]IC    Personal history of malignant neoplasm of prostate     T1a, [place] 6 (3+3)     Prostate cancer ([address]     PUD (peptic ulcer disease)     Sick sinus syndrome ([address]     status post implantation of Medtronic dual-chamber permanent pacemaker 3/22/11.  Unspecified essential hypertension     Venereal disease       Current Outpatient Prescriptions   Medication Sig Dispense Refill    latanoprost (XALATAN) 0.005 % ophthalmic solution Administer 1 Drop to both eyes daily.  brimonidine (ALPHAGAN) 0.2 % ophthalmic solution Administer 1 Drop to both eyes two (2) times a day.       dilTIAZem CD (CARDIZEM CD) 240 mg ER capsule TAKE 1 CAPSULE TWICE A  Cap 3    aspirin 81 mg tablet Take 2 Tabs by mouth daily. 1 Tab 0    ZINC MTH/COPPER/SAW PALM/GNSG (PROSTATE HEALTH FORMULA PO) Take 1 Tab by mouth daily.  omega-3 fatty acids-vitamin e (FISH OIL) 1,000 mg Cap Take 1 Cap by mouth daily. Allergies   Allergen Reactions    Shellfish Containing Products Swelling      Social History   Substance Use Topics    Smoking status: Former Smoker     Packs/day: 0.25     Years: 1.00     Quit date: 4/8/1981    Smokeless tobacco: Never Used    Alcohol use No         Review of Systems   Constitutional: Negative for chills, fever and weight loss. HENT: Negative for nosebleeds. Eyes: Negative for blurred vision and double vision. Respiratory: Negative for cough, shortness of breath and wheezing. Cardiovascular: Positive for chest pain ( Atypical). Negative for palpitations, orthopnea, claudication, leg swelling and PND. Gastrointestinal: Negative for abdominal pain, heartburn, nausea and vomiting. Genitourinary: Negative for dysuria and hematuria. Musculoskeletal: Negative for falls and myalgias. Skin: Negative for rash. Neurological: Negative for dizziness, focal weakness and headaches. Endo/Heme/Allergies: Does not bruise/bleed easily. Psychiatric/Behavioral: Negative for substance abuse. Visit Vitals    /72    Pulse 87    Ht 6' 2.5\" (1.892 m)    Wt 96.2 kg (212 lb)    SpO2 98%    BMI 26.86 kg/m2      Physical Exam   Constitutional: He is oriented to person, place, and time. He appears well-developed and well-nourished. HENT:   Head: Normocephalic and atraumatic. Eyes: Conjunctivae are normal.   Neck: Neck supple. No JVD present. Carotid bruit is not present. Cardiovascular: Normal rate, regular rhythm, S1 normal, S2 normal and normal pulses. Exam reveals no gallop and no S3. No murmur heard. Pulmonary/Chest: Breath sounds normal. He has no wheezes. He has no rales. Abdominal: Soft. Bowel sounds are normal. There is no tenderness.    Musculoskeletal: He exhibits no edema. Neurological: He is alert and oriented to person, place, and time. Skin: Skin is warm and dry. EKG: Atrial paced rhythm, intrinsic QRS conduction with an incomplete right bundle branch block, no ST or T wave abnormalities. No change compared to the previous EKG. Pacemaker interrogation. Battery life estimated at 3.5 years. No significant arrhythmias identified. Pacing in the atrium 99%, and in the ventricle 0%. Scanned document for details. ASSESSMENT and PLAN    Hypertension. Patient's blood pressure now appears well-controlled on Cardizem as monotherapy. Paroxysmal atrial fibrillation. No significant episodes of atrial fibrillation over the past several years. I would continue his regimen of Cardizem and aspirin for now. No need for anticoagulation unless he develops prolonged episodes of atrial fibrillation. Sick sinus syndrome. Status post dual-chamber permanent pacemaker. Normal device function on interrogation. The patient continues to pace the majority of the time in the atrium and very little in the ventricle. Battery life estimated at 3.5 years. Dyslipidemia. This is only been mildly elevated in the past.  This is followed closely by his PCP. The patient works on lifestyle modification. Atypical chest pain. 2 total episodes over the past 6 months. The sounds not anginal in nature. No further cardiac workup needed at this time. Followup in 6 months, Sooner if needed.

## 2018-06-25 NOTE — MR AVS SNAPSHOT
[address] [facility] [address] [place] [address] 
[phone] Patient: Keshia Fernandez MRN: [id number] [id number] Visit Information Date & Time Provider Department Dept. Phone Encounter #  
 6/25/2018 11:20 AM Mirta Youssef MD [facility] [id number] Your Appointments 7/19/2018  7:00 AM  
LAB with [facility] (LEIANA Hernandes) Appt Note: lab [address] [facility] [address] [address]  
[phone]  
  
   
 [address], [place] [facility]  
  
    
 7/26/2018 11:00 AM  
Office Visit with Chris White MD  
[address] [place] [address]-[facility]) Appt Note: mwv  
 [address], [place] [address] [address]  
[phone]  
  
   
 [address], [address] [address]  
  
    
 9/4/2018  9:10 AM  
Nurse Visit with Shandra Hoffman [facility] ([facility]) Appt Note: PSA PRIOR  
 [address] [facility] [address]  
  
   
 [address]  
  
    
 1/4/2019 10:20 AM  
Follow Up with Mirta Youssef MD  
[facility] Appt Note: 6 mo f/u  
 [address] [place] [address]  
[phone] [address] [facility] [address] 9/13/2018 10:30 AM  
Any with Dana Russell MD  
[facility] ([facility]) Appt Note: Return in about 6 months (around 9/13/2018) for hx CaP, OAB, PSA prior. [address] [place] [address]  
  
   
 [address] [facility] [facility] [address] Upcoming Health Maintenance Date Due ZOSTER VACCINE AGE 60> 9/30/1999 Pneumococcal 65+ High/Highest Risk (2 of 2 - PPSV23) 9/20/2016 MEDICARE YEARLY EXAM 7/28/2018 Influenza Age 5 to Adult 8/1/2018 GLAUCOMA SCREENING Q2Y 9/8/2019 DTaP/Tdap/Td series (2 - Td) 10/27/2023 COLONOSCOPY 10/14/2024 Allergies as of 6/25/2018  Review Complete On: 3/13/2018 By: Chuck Galan MD  
  
 Severity Noted Reaction Type Reactions Shellfish Containing Products    Swelling Current Immunizations  Reviewed on 10/26/2017 Name Date Influenza High Dose Vaccine PF 10/26/2017 Influenza Vaccine 11/19/2014, 11/12/2013 Influenza Vaccine (Quad) PF 11/7/2016, 10/23/2015 Influenza Vaccine Split 11/23/2011, 12/15/2010 Influenza Vaccine Whole 12/9/2012 Pneumococcal Conjugate (PCV-13) 7/26/2016 TD Vaccine 5/10/2004 Tdap 10/27/2013 10:12 AM  
 ZZZ-RETIRED (DO NOT USE) Pneumococcal Vaccine (Unspecified Type) 5/10/2004 Not reviewed this visit You Were Diagnosed With   
  
 Codes Comments Paroxysmal atrial fibrillation (HCC)    -  Primary ICD-10-CM: I48.0 ICD-9-CM: 427.31 Essential hypertension     ICD-10-CM: I10 
ICD-9-CM: 401.9 Dyslipidemia     ICD-10-CM: E78.5 ICD-9-CM: 272.4 Sick sinus syndrome (HCC)     ICD-10-CM: I49.5 ICD-9-CM: 427.81 Vitals BP Pulse Height(growth percentile) Weight(growth percentile) SpO2 BMI  
 132/72 87 6' 2.5\" (1.892 m) 212 lb (96.2 kg) 98% 26.86 kg/m2 Smoking Status Former Smoker Vitals History BMI and BSA Data Body Mass Index Body Surface Area  
 26.86 kg/m 2 2.25 m 2 Preferred Pharmacy Pharmacy Name Phone [facility] [phone] Your Updated Medication List  
  
   
This list is accurate as of 6/25/18 12:02 PM.  Always use your most recent med list.  
  
  
  
  
 aspirin 81 mg tablet Take 2 Tabs by mouth daily. brimonidine 0.2 % ophthalmic solution Commonly known as:  Nataliia Chick Administer 1 Drop to both eyes two (2) times a day. dilTIAZem  mg ER capsule Commonly known as:  CARDIZEM CD  
TAKE 1 CAPSULE TWICE A DAY  
  
 FISH OIL 1,000 mg Cap Generic drug:  omega-3 fatty acids-vitamin e Take 1 Cap by mouth daily. latanoprost 0.005 % ophthalmic solution Commonly known as:  Teetee Lundberg Administer 1 Drop to both eyes daily. PROSTATE HEALTH FORMULA PO Take 1 Tab by mouth daily. We Performed the Following AMB POC EKG ROUTINE W/ 12 LEADS, INTER & REP [62059 CPT(R)] Introducing [facility] SERVICES! Dear William Williamson: Thank you for requesting a [employer] account. Our records indicate that you already have an active [employer] account. You can access your account anytime at [email or web address] Did you know that you can access your hospital and ER discharge instructions at any time in [employer]? You can also review all of your test results from your hospital stay or ER visit. Additional Information If you have questions, please visit the Frequently Asked Questions section of the [employer] website at [email or web address] Remember, [employer] is NOT to be used for urgent needs. For medical emergencies, dial 911. Now available from your iPhone and Android! Please provide this summary of care documentation to your next provider. Your primary care clinician is listed as JOSEP WOODALL. If you have any questions after today's visit, please call [phone].

## 2018-07-03 RX ORDER — DILTIAZEM HYDROCHLORIDE 240 MG/1
CAPSULE, COATED, EXTENDED RELEASE ORAL
Qty: 180 CAP | Refills: 3 | Status: SHIPPED | OUTPATIENT
Start: 2018-07-03 | End: 2019-07-31 | Stop reason: SDUPTHER

## 2018-07-19 ENCOUNTER — HOSPITAL ENCOUNTER (OUTPATIENT)
Dept: LAB | Age: 78
Discharge: HOME | End: 2018-07-19
Payer: MEDICARE

## 2018-07-19 DIAGNOSIS — I10 ESSENTIAL HYPERTENSION: ICD-10-CM

## 2018-07-19 LAB
ALBUMIN SERPL-MCNC: 4 G/DL (ref 3.4–5)
ALBUMIN/GLOB SERPL: 1.3 {RATIO} (ref 0.8–1.7)
ALP SERPL-CCNC: 93 U/L (ref 45–117)
ALT SERPL-CCNC: 23 U/L (ref 16–61)
ANION GAP SERPL CALC-SCNC: 6 MMOL/L (ref 3–18)
AST SERPL-CCNC: 17 U/L (ref 15–37)
BILIRUB SERPL-MCNC: 0.5 MG/DL (ref 0.2–1)
BUN SERPL-MCNC: 20 MG/DL (ref 7–18)
BUN/CREAT SERPL: 17 (ref 12–20)
CALCIUM SERPL-MCNC: 8.9 MG/DL (ref 8.5–10.1)
CHLORIDE SERPL-SCNC: 108 MMOL/L (ref 100–108)
CO2 SERPL-SCNC: 29 MMOL/L (ref 21–32)
CREAT SERPL-MCNC: 1.19 MG/DL (ref 0.6–1.3)
GLOBULIN SER CALC-MCNC: 3.1 G/DL (ref 2–4)
GLUCOSE SERPL-MCNC: 104 MG/DL (ref 74–99)
POTASSIUM SERPL-SCNC: 4.6 MMOL/L (ref 3.5–5.5)
PROT SERPL-MCNC: 7.1 G/DL (ref 6.4–8.2)
SODIUM SERPL-SCNC: 143 MMOL/L (ref 136–145)

## 2018-07-19 PROCEDURE — 80053 COMPREHEN METABOLIC PANEL: CPT | Performed by: INTERNAL MEDICINE

## 2018-07-19 PROCEDURE — 36415 COLL VENOUS BLD VENIPUNCTURE: CPT | Performed by: INTERNAL MEDICINE

## 2018-07-26 ENCOUNTER — OFFICE VISIT (OUTPATIENT)
Dept: FAMILY MEDICINE CLINIC | Age: 78
End: 2018-07-26

## 2018-07-26 VITALS
WEIGHT: 209 LBS | SYSTOLIC BLOOD PRESSURE: 121 MMHG | OXYGEN SATURATION: 96 % | TEMPERATURE: 97.4 F | RESPIRATION RATE: 20 BRPM | DIASTOLIC BLOOD PRESSURE: 66 MMHG | HEIGHT: 74 IN | BODY MASS INDEX: 26.82 KG/M2 | HEART RATE: 79 BPM

## 2018-07-26 DIAGNOSIS — C61 MALIGNANT NEOPLASM OF PROSTATE (HCC): ICD-10-CM

## 2018-07-26 DIAGNOSIS — Z00.00 MEDICARE ANNUAL WELLNESS VISIT, SUBSEQUENT: Primary | ICD-10-CM

## 2018-07-26 DIAGNOSIS — I10 ESSENTIAL HYPERTENSION: ICD-10-CM

## 2018-07-26 DIAGNOSIS — I48.0 PAROXYSMAL ATRIAL FIBRILLATION (HCC): ICD-10-CM

## 2018-07-26 NOTE — PATIENT INSTRUCTIONS
A Healthy Lifestyle: Care Instructions  Your Care Instructions    A healthy lifestyle can help you feel good, stay at a healthy weight, and have plenty of energy for both work and play. A healthy lifestyle is something you can share with your whole family. A healthy lifestyle also can lower your risk for serious health problems, such as high blood pressure, heart disease, and diabetes. You can follow a few steps listed below to improve your health and the health of your family. Follow-up care is a key part of your treatment and safety. Be sure to make and go to all appointments, and call your doctor if you are having problems. It's also a good idea to know your test results and keep a list of the medicines you take. How can you care for yourself at home? · Do not eat too much sugar, fat, or fast foods. You can still have dessert and treats now and then. The goal is moderation. · Start small to improve your eating habits. Pay attention to portion sizes, drink less juice and soda pop, and eat more fruits and vegetables. ¨ Eat a healthy amount of food. A 3-ounce serving of meat, for example, is about the size of a deck of cards. Fill the rest of your plate with vegetables and whole grains. ¨ Limit the amount of soda and sports drinks you have every day. Drink more water when you are thirsty. ¨ Eat at least 5 servings of fruits and vegetables every day. It may seem like a lot, but it is not hard to reach this goal. A serving or helping is 1 piece of fruit, 1 cup of vegetables, or 2 cups of leafy, raw vegetables. Have an apple or some carrot sticks as an afternoon snack instead of a candy bar. Try to have fruits and/or vegetables at every meal.  · Make exercise part of your daily routine. You may want to start with simple activities, such as walking, bicycling, or slow swimming. Try to be active 30 to 60 minutes every day. You do not need to do all 30 to 60 minutes all at once.  For example, you can exercise 3 times a day for 10 or 20 minutes. Moderate exercise is safe for most people, but it is always a good idea to talk to your doctor before starting an exercise program.  · Keep moving. Jp Sang the lawn, work in the garden, or [employer]. Take the stairs instead of the elevator at work. · If you smoke, quit. People who smoke have an increased risk for heart attack, stroke, cancer, and other lung illnesses. Quitting is hard, but there are ways to boost your chance of quitting tobacco for good. ¨ Use nicotine gum, patches, or lozenges. ¨ Ask your doctor about stop-smoking programs and medicines. ¨ Keep trying. In addition to reducing your risk of diseases in the future, you will notice some benefits soon after you stop using tobacco. If you have shortness of breath or asthma symptoms, they will likely get better within a few weeks after you quit. · Limit how much alcohol you drink. Moderate amounts of alcohol (up to 2 drinks a day for men, 1 drink a day for women) are okay. But drinking too much can lead to liver problems, high blood pressure, and other health problems. Family health  If you have a family, there are many things you can do together to improve your health. · Eat meals together as a family as often as possible. · Eat healthy foods. This includes fruits, vegetables, lean meats and dairy, and whole grains. · Include your family in your fitness plan. Most people think of activities such as jogging or tennis as the way to fitness, but there are many ways you and your family can be more active. Anything that makes you breathe hard and gets your heart pumping is exercise. Here are some tips:  ¨ Walk to do errands or to take your child to school or the bus. ¨ Go for a family bike ride after dinner instead of watching TV. Where can you learn more? Go to http://lachelle-gi.info/. Enter K140 in the search box to learn more about \"A Healthy Lifestyle: Care Instructions. \"  Current as of: December 7, 2017  Content Version: 11.7  © 8027-4819 [employer]. Care instructions adapted under license by [employer] (which disclaims liability or warranty for this information). If you have questions about a medical condition or this instruction, always ask your healthcare professional. [address] any warranty or liability for your use of this information. Medicare Part B Preventive Services Limitations Recommendation Scheduled   Bone Mass Measurement  (age 72 & older, biennial) Requires diagnosis related to osteoporosis or estrogen deficiency. Biennial benefit unless patient has history of long-term glucocorticoid tx or baseline is needed because initial test was by other method     Cardiovascular Screening Blood Tests (every 5 years)  Total cholesterol, HDL, Triglycerides Order as a panel if possible  1/18   Colorectal Cancer Screening  -Fecal occult blood test (annual)  -Flexible sigmoidoscopy (5y)  -Screening colonoscopy (10y)  -Barium Enema   10/14   Counseling to Prevent Tobacco Use (up to 8 sessions per year)  - Counseling greater than 3 and up to 10 minutes  - Counseling greater than 10 minutes Patients must be asymptomatic of tobacco-related conditions to receive as preventive service     Diabetes Screening Tests (at least every 3 years, Medicare covers annually or at 6-month intervals for prediabetic patients)    Fasting blood sugar (FBS) or glucose tolerance test (GTT) Patient must be diagnosed with one of the following:  -Hypertension, Dyslipidemia, obesity, previous impaired FBS or GTT  Or any two of the following: overweight, FH of diabetes, age ? 72, history of gestational diabetes, birth of baby weighing more than 9 pounds     Diabetes Self-Management Training (DSMT) (no USPSTF recommendation) Requires referral by treating physician for patient with diabetes or renal disease.  10 hours of initial DSMT session of no less than 30 minutes each in a continuous 12-month period. 2 hours of follow-up DSMT in subsequent years. Glaucoma Screening (no USPSTF recommendation) Diabetes mellitus, family history, , age 48 or over,  American, age 72 or over  9/17   Human Immunodeficiency Virus (HIV) Screening (annually for increased risk patients)  HIV-1 and HIV-2 by EIA, MICHAEL, rapid antibody test, or oral mucosa transudate Patient must be at increased risk for HIV infection per USPSTF guidelines or pregnant. Tests covered annually for patients at increased risk. Pregnant patients may receive up to 3 test during pregnancy. Medical Nutrition Therapy (MNT) (for diabetes or renal disease not recommended schedule) Requires referral by treating physician for patient with diabetes or renal disease. Can be provided in same year as diabetes self-management training (DSMT), and CMS recommends medical nutrition therapy take place after DSMT. Up to 3 hours for initial year and 2 hours in subsequent years. Prostate Cancer Screening (annually up to age 76)  - Digital rectal exam (ASTRID)  - Prostate specific antigen (PSA) Annually (age 48 or over), ASTRID not paid separately when covered E/M service is provided on same date  Men up to age 76 may need a screening blood test for prostate cancer at certain intervals, depending on their personal and family history. This decision is between the patient and his provider. Seasonal Influenza Vaccination (annually)   10/17     Pneumococcal Vaccination (once after 65)   7/16 p13  p23 5/04     Hepatitis B Vaccinations (if medium/high risk) Medium/high risk factors:  End-stage renal disease,  Hemophiliacs who received Factor VIII or IX concentrates, Clients of institutions for the mentally retarded, Persons who live in the same house as a HepB virus carrier, Homosexual men, Illicit injectable drug abusers.      Shingles Vaccination A shingles vaccine is also recommended once in a lifetime after age 60     Ultrasound Screening for Abdominal Aortic Aneurysm (AAA) (once) Patient must be referred through IPPE and not have had a screening for abdominal aortic aneurysm before under Medicare. Limited to patients who meet one of the following criteria:  - Men who are 73-68 years old and have smoked more than 100 cigarettes in their lifetime.  -Anyone with a FH of AAA  -Anyone recommended for screening by USPSTF            A Healthy Lifestyle: Care Instructions  Your Care Instructions    A healthy lifestyle can help you feel good, stay at a healthy weight, and have plenty of energy for both work and play. A healthy lifestyle is something you can share with your whole family. A healthy lifestyle also can lower your risk for serious health problems, such as high blood pressure, heart disease, and diabetes. You can follow a few steps listed below to improve your health and the health of your family. Follow-up care is a key part of your treatment and safety. Be sure to make and go to all appointments, and call your doctor if you are having problems. It's also a good idea to know your test results and keep a list of the medicines you take. How can you care for yourself at home? · Do not eat too much sugar, fat, or fast foods. You can still have dessert and treats now and then. The goal is moderation. · Start small to improve your eating habits. Pay attention to portion sizes, drink less juice and soda pop, and eat more fruits and vegetables. ¨ Eat a healthy amount of food. A 3-ounce serving of meat, for example, is about the size of a deck of cards. Fill the rest of your plate with vegetables and whole grains. ¨ Limit the amount of soda and sports drinks you have every day. Drink more water when you are thirsty. ¨ Eat at least 5 servings of fruits and vegetables every day.  It may seem like a lot, but it is not hard to reach this goal. A serving or helping is 1 piece of fruit, 1 cup of vegetables, or 2 cups of leafy, raw vegetables. Have an apple or some carrot sticks as an afternoon snack instead of a candy bar. Try to have fruits and/or vegetables at every meal.  · Make exercise part of your daily routine. You may want to start with simple activities, such as walking, bicycling, or slow swimming. Try to be active 30 to 60 minutes every day. You do not need to do all 30 to 60 minutes all at once. For example, you can exercise 3 times a day for 10 or 20 minutes. Moderate exercise is safe for most people, but it is always a good idea to talk to your doctor before starting an exercise program.  · Keep moving. Silvestre Cl[employer] the lawn, work in the garden, or [employer]. Take the stairs instead of the elevator at work. · If you smoke, quit. People who smoke have an increased risk for heart attack, stroke, cancer, and other lung illnesses. Quitting is hard, but there are ways to boost your chance of quitting tobacco for good. ¨ Use nicotine gum, patches, or lozenges. ¨ Ask your doctor about stop-smoking programs and medicines. ¨ Keep trying. In addition to reducing your risk of diseases in the future, you will notice some benefits soon after you stop using tobacco. If you have shortness of breath or asthma symptoms, they will likely get better within a few weeks after you quit. · Limit how much alcohol you drink. Moderate amounts of alcohol (up to 2 drinks a day for men, 1 drink a day for women) are okay. But drinking too much can lead to liver problems, high blood pressure, and other health problems. Family health  If you have a family, there are many things you can do together to improve your health. · Eat meals together as a family as often as possible. · Eat healthy foods. This includes fruits, vegetables, lean meats and dairy, and whole grains. · Include your family in your fitness plan.  Most people think of activities such as jogging or tennis as the way to fitness, but there are many ways you and your family can be more active. Anything that makes you breathe hard and gets your heart pumping is exercise. Here are some tips:  ¨ Walk to do errands or to take your child to school or the bus. ¨ Go for a family bike ride after dinner instead of watching TV. Where can you learn more? Go to http://lachelle-gi.info/. Enter M959 in the search box to learn more about \"A Healthy Lifestyle: Care Instructions. \"  Current as of: December 7, 2017  Content Version: 11.7  © 5700-3795 [employer]. Care instructions adapted under license by [employer] (which disclaims liability or warranty for this information). If you have questions about a medical condition or this instruction, always ask your healthcare professional. [address] any warranty or liability for your use of this information.

## 2018-07-26 NOTE — PROGRESS NOTES
Patient is in the office today for medical wellness check. 1. Have you been to the ER, urgent care clinic since your last visit? Hospitalized since your last visit? No    2. Have you seen or consulted any other health care providers outside of the [address] [place] [address] since your last visit? Include any pap smears or colon screening. No          This is the Subsequent Medicare Annual Wellness Exam, performed 12 months or more after the Initial AWV or the last Subsequent AWV    I have reviewed the patient's medical history in detail and updated the computerized patient record. History     Past Medical History:   Diagnosis Date    Atrial fibrillation (HCC)     CHADS 0  (-CHF, -HTN, -AGE, -DM, -CVA)    Cardiac echocardiogram 06/29/2010    EF 70-75%. Mild conc LVh. Gr 1 DDfx. Mild DELMY.  Cardiac Holter monitor, normal 06/28/2010    Normal Holter study.  Cardiac nuclear imaging test 11/22/2010    Sm area of apical ischemia and inferolateral scar, both possibly apical thinning. No WMA. EF 60%.  Impotence of organic origin     Pacemaker     3/11  MEDTRONIC    Personal history of malignant neoplasm of prostate     T1a, [place] 6 (3+3)     Prostate cancer ([facility] [address]     PUD (peptic ulcer disease)     Sick sinus syndrome ([facility] [address]     status post implantation of Medtronic dual-chamber permanent pacemaker 3/22/11.  Unspecified essential hypertension     Venereal disease       Past Surgical History:   Procedure Laterality Date    HX CATARACT REMOVAL      HX MOHS PROCEDURES  1996    GA COLONOSCOPY FLX DX W/COLLJ SPEC WHEN PFRMD      GA CRYOSURG ABLATION OF PROSTATE  2/08    [facility], Dr. Birdie Loera     Current Outpatient Prescriptions   Medication Sig Dispense Refill    dilTIAZem CD (CARDIZEM CD) 240 mg ER capsule TAKE 1 CAPSULE TWICE A  Cap 3    latanoprost (XALATAN) 0.005 % ophthalmic solution Administer 1 Drop to both eyes daily.       brimonidine (ALPHAGAN) 0.2 % ophthalmic solution Administer 1 Drop to both eyes two (2) times a day.  aspirin 81 mg tablet Take 2 Tabs by mouth daily. 1 Tab 0    ZINC MTH/COPPER/SAW PALM/GNSG (PROSTATE HEALTH FORMULA PO) Take 1 Tab by mouth daily.  omega-3 fatty acids-vitamin e (FISH OIL) 1,000 mg Cap Take 1 Cap by mouth daily. Allergies   Allergen Reactions    Shellfish Containing Products Swelling     History reviewed. No pertinent family history. Social History   Substance Use Topics    Smoking status: Former Smoker     Packs/day: 0.25     Years: 1.00     Quit date: 4/8/1981    Smokeless tobacco: Never Used    Alcohol use No     Patient Active Problem List   Diagnosis Code    Atrial fibrillation (HCC) I48.91    Sick sinus syndrome (HCC) I49.5    Impotence of organic origin N52.9    Personal history of malignant neoplasm of prostate Z85.46    Elevated prostate specific antigen (PSA) R97.20    HTN (hypertension) I10    Malignant neoplasm of prostate ([facility] [address] C61    Dyslipidemia E78.5    Essential hypertension I10    ACP (advance care planning) Z71.89    Cardiac pacemaker in situ Z95.0       Depression Risk Factor Screening:     PHQ over the last two weeks 7/26/2018   Little interest or pleasure in doing things Not at all   Feeling down, depressed, irritable, or hopeless Not at all   Total Score PHQ 2 0     Alcohol Risk Factor Screening: You do not drink alcohol or very rarely. Functional Ability and Level of Safety:   Hearing Loss  Hearing is good. Activities of Daily Living  The home contains: [employer]  Patient does total self care    Fall Risk  Fall Risk Assessment, last 12 mths 7/26/2018   Able to walk? Yes   Fall in past 12 months? Yes   Fall with injury?  No   Number of falls in past 12 months 1   Fall Risk Score 1       Abuse Screen  Patient is not abused    Cognitive Screening   Evaluation of Cognitive Function:  Has your family/caregiver stated any concerns about your memory: no  Normal    Patient Care Team   Patient Care Team:  Izabel Francis MD as PCP - Emelia Stanford MD (Urology)  Guerrero Smith MD (Cardiology)  Sherita Heart MD as Hospitalist (Radiation Oncology)  Cullen Sky MD (Ophthalmology)    Glaucoma Screening-  pt following for glaucoma   Pneumonia Vaccine- patient needs Pneumovax to be completed, declines today  Shingles Vaccine-  Declines currently.    Tdap Vaccine-  10/2013    Colonoscopy-  10/2014 Dr Jenny Nunez normal f/u in 10 yrs    PSA- followed by Dr Sanaz Handy for prostate cancer status post external beam radiation  Advance Directive-  Has one. Will try to give us copy    Assessment/Plan   Education and counseling provided:  Are appropriate based on today's review and evaluation    Diagnoses and all orders for this visit:    1. Medicare annual wellness visit, subsequent    2. Essential hypertension  -     METABOLIC PANEL, COMPREHENSIVE; Future    3. Paroxysmal atrial fibrillation (HCC)    4. Malignant neoplasm of prostate [facility])        Health Maintenance Due   Topic Date Due    ZOSTER VACCINE AGE 60>  09/30/1999    Pneumococcal 65+ High/Highest Risk (2 of 2 - PPSV23) 09/20/2016     A comprehensive 5 year plan for medical care and screening exams was reviewed with pt and they received a copy of it.

## 2018-07-26 NOTE — MR AVS SNAPSHOT
Julio Martinezdi 
 
 
 [address] [address] 
[phone] Patient: Willy Aguilera MRN: [id number] PZP:[id number] Visit Information Date & Time Provider Department Dept. Phone Encounter #  
 7/26/2018 11:00 AM Machelle Carver, [address] [place] [address] [place] [phone] [id number] Follow-up Instructions Return in about 6 months (around 1/26/2019) for labs 1 week before. Your Appointments 9/4/2018  9:10 AM  
Nurse Visit with Gloria Levine [facility] ([facility]) Appt Note: PSA PRIOR  
 [address] [facility]  
  
   
 [address] [place] [address]  
  
    
 1/4/2019 10:20 AM  
Follow Up with Gavino Conley MD  
[facility] ([facility]) Appt Note: 6 mo f/u  
 [address] [place] [address] [address] [address] [phone] 1212 [facility] [address] 9/13/2018 10:30 AM  
Any with Saima Saldana MD  
[facility] ([facility]) Appt Note: Return in about 6 months (around 9/13/2018) for hx CaP, OAB, PSA prior. [address] [facility] [address] [address] [address] [facility]  
  
   
 [address] [place] [address] [address] [place] [address] 100 200 [facility] Upcoming Health Maintenance Date Due ZOSTER VACCINE AGE 60> 9/30/1999 Pneumococcal 65+ High/Highest Risk (2 of 2 - PPSV23) 9/20/2016 MEDICARE YEARLY EXAM 7/28/2018 Influenza Age 5 to Adult 8/1/2018 GLAUCOMA SCREENING Q2Y 9/8/2019 DTaP/Tdap/Td series (2 - Td) 10/27/2023 COLONOSCOPY 10/14/2024 Allergies as of 7/26/2018  Review Complete On: 7/26/2018 By: Machelle Carver MD  
  
 Severity Noted Reaction Type Reactions Shellfish Containing Products    Swelling Current Immunizations  Reviewed on 10/26/2017 Name Date Influenza High Dose Vaccine PF 10/26/2017 Influenza Vaccine 11/19/2014, 11/12/2013 Influenza Vaccine (Quad) PF 11/7/2016, 10/23/2015 Influenza Vaccine Split 11/23/2011, 12/15/2010 Influenza Vaccine Whole 12/9/2012 Pneumococcal Conjugate (PCV-13) 7/26/2016 TD Vaccine 5/10/2004 Tdap 10/27/2013 10:12 AM  
 ZZZ-RETIRED (DO NOT USE) Pneumococcal Vaccine (Unspecified Type) 5/10/2004 Not reviewed this visit You Were Diagnosed With   
  
 Codes Comments Medicare annual wellness visit, subsequent    -  Primary ICD-10-CM: Z00.00 ICD-9-CM: V70.0 Essential hypertension     ICD-10-CM: I10 
ICD-9-CM: 401.9 Paroxysmal atrial fibrillation (HCC)     ICD-10-CM: I48.0 ICD-9-CM: 427.31 Vitals BP Pulse Temp Resp Height(growth percentile) Weight(growth percentile) 121/66 (BP 1 Location: Left arm, BP Patient Position: Sitting) 79 97.4 °F (36.3 °C) (Oral) 20 6' 2\" (1.88 m) 209 lb (94.8 kg) SpO2 BMI Smoking Status 96% 26.83 kg/m2 Former Smoker BMI and BSA Data Body Mass Index Body Surface Area  
 26.83 kg/m 2 2.22 m 2 Preferred Pharmacy Pharmacy Name Phone Charline Ramesh, [facility] [phone] Your Updated Medication List  
  
   
This list is accurate as of 7/26/18 12:01 PM.  Always use your most recent med list.  
  
  
  
  
 aspirin 81 mg tablet Take 2 Tabs by mouth daily. brimonidine 0.2 % ophthalmic solution Commonly known as:  Myrl Hockey Administer 1 Drop to both eyes two (2) times a day. dilTIAZem  mg ER capsule Commonly known as:  CARDIZEM CD  
TAKE 1 CAPSULE TWICE A DAY  
  
 FISH OIL 1,000 mg Cap Generic drug:  omega-3 fatty acids-vitamin e Take 1 Cap by mouth daily. latanoprost 0.005 % ophthalmic solution Commonly known as:  Vernon Ospina Administer 1 Drop to both eyes daily. PROSTATE HEALTH FORMULA PO Take 1 Tab by mouth daily. Follow-up Instructions Return in about 6 months (around 1/26/2019) for labs 1 week before. Patient Instructions A Healthy Lifestyle: Care Instructions Your Care Instructions A healthy lifestyle can help you feel good, stay at a healthy weight, and have plenty of energy for both work and play. A healthy lifestyle is something you can share with your whole family. A healthy lifestyle also can lower your risk for serious health problems, such as high blood pressure, heart disease, and diabetes. You can follow a few steps listed below to improve your health and the health of your family. Follow-up care is a key part of your treatment and safety. Be sure to make and go to all appointments, and call your doctor if you are having problems. It's also a good idea to know your test results and keep a list of the medicines you take. How can you care for yourself at home? · Do not eat too much sugar, fat, or fast foods. You can still have dessert and treats now and then. The goal is moderation. · Start small to improve your eating habits. Pay attention to portion sizes, drink less juice and soda pop, and eat more fruits and vegetables. ¨ Eat a healthy amount of food. A 3-ounce serving of meat, for example, is about the size of a deck of cards. Fill the rest of your plate with vegetables and whole grains. ¨ Limit the amount of soda and sports drinks you have every day. Drink more water when you are thirsty. ¨ Eat at least 5 servings of fruits and vegetables every day. It may seem like a lot, but it is not hard to reach this goal. A serving or helping is 1 piece of fruit, 1 cup of vegetables, or 2 cups of leafy, raw vegetables. Have an apple or some carrot sticks as an afternoon snack instead of a candy bar.  Try to have fruits and/or vegetables at every meal. 
 · Make exercise part of your daily routine. You may want to start with simple activities, such as walking, bicycling, or slow swimming. Try to be active 30 to 60 minutes every day. You do not need to do all 30 to 60 minutes all at once. For example, you can exercise 3 times a day for 10 or 20 minutes. Moderate exercise is safe for most people, but it is always a good idea to talk to your doctor before starting an exercise program. 
· Keep moving. John Goltz the lawn, work in the garden, or [employer]. Take the stairs instead of the elevator at work. · If you smoke, quit. People who smoke have an increased risk for heart attack, stroke, cancer, and other lung illnesses. Quitting is hard, but there are ways to boost your chance of quitting tobacco for good. ¨ Use nicotine gum, patches, or lozenges. ¨ Ask your doctor about stop-smoking programs and medicines. ¨ Keep trying. In addition to reducing your risk of diseases in the future, you will notice some benefits soon after you stop using tobacco. If you have shortness of breath or asthma symptoms, they will likely get better within a few weeks after you quit. · Limit how much alcohol you drink. Moderate amounts of alcohol (up to 2 drinks a day for men, 1 drink a day for women) are okay. But drinking too much can lead to liver problems, high blood pressure, and other health problems. Family health If you have a family, there are many things you can do together to improve your health. · Eat meals together as a family as often as possible. · Eat healthy foods. This includes fruits, vegetables, lean meats and dairy, and whole grains. · Include your family in your fitness plan. Most people think of activities such as jogging or tennis as the way to fitness, but there are many ways you and your family can be more active. Anything that makes you breathe hard and gets your heart pumping is exercise. Here are some tips: ¨ Walk to do errands or to take your child to school or the bus. ¨ Go for a family bike ride after dinner instead of watching TV. Where can you learn more? Go to http://lachelle-gi.info/. Enter F453 in the search box to learn more about \"A Healthy Lifestyle: Care Instructions. \" Current as of: December 7, 2017 Content Version: 11.7 © 3171-3219 Stroz Friedberg. Care instructions adapted under license by [employer] (which disclaims liability or warranty for this information). If you have questions about a medical condition or this instruction, always ask your healthcare professional. [place] [address] any warranty or liability for your use of this information. Medicare Part B Preventive Services Limitations Recommendation Scheduled Bone Mass Measurement 
(age 72 & older, biennial) Requires diagnosis related to osteoporosis or estrogen deficiency. Biennial benefit unless patient has history of long-term glucocorticoid tx or baseline is needed because initial test was by other method Cardiovascular Screening Blood Tests (every 5 years) Total cholesterol, HDL, Triglycerides Order as a panel if possible  1/18 Colorectal Cancer Screening 
-Fecal occult blood test (annual) -Flexible sigmoidoscopy (5y) 
-Screening colonoscopy (10y) -Barium Enema   10/14 Counseling to Prevent Tobacco Use (up to 8 sessions per year) - Counseling greater than 3 and up to 10 minutes - Counseling greater than 10 minutes Patients must be asymptomatic of tobacco-related conditions to receive as preventive service Diabetes Screening Tests (at least every 3 years, Medicare covers annually or at 6-month intervals for prediabetic patients) Fasting blood sugar (FBS) or glucose tolerance test (GTT) Patient must be diagnosed with one of the following: 
-Hypertension, Dyslipidemia, obesity, previous impaired FBS or GTT Or any two of the following: overweight, FH of diabetes, age ? 72, history of gestational diabetes, birth of baby weighing more than 9 pounds Diabetes Self-Management Training (DSMT) (no USPSTF recommendation) Requires referral by treating physician for patient with diabetes or renal disease. 10 hours of initial DSMT session of no less than 30 minutes each in a continuous 12-month period. 2 hours of follow-up DSMT in subsequent years. Glaucoma Screening (no USPSTF recommendation) Diabetes mellitus, family history, , age 48 or over,  American, age 72 or over  9/17 Human Immunodeficiency Virus (HIV) Screening (annually for increased risk patients) HIV-1 and HIV-2 by EIA, MICHAEL, rapid antibody test, or oral mucosa transudate Patient must be at increased risk for HIV infection per USPSTF guidelines or pregnant. Tests covered annually for patients at increased risk. Pregnant patients may receive up to 3 test during pregnancy. Medical Nutrition Therapy (MNT) (for diabetes or renal disease not recommended schedule) Requires referral by treating physician for patient with diabetes or renal disease. Can be provided in same year as diabetes self-management training (DSMT), and CMS recommends medical nutrition therapy take place after DSMT. Up to 3 hours for initial year and 2 hours in subsequent years. Prostate Cancer Screening (annually up to age 76) - Digital rectal exam (ASTRID) - Prostate specific antigen (PSA) Annually (age 48 or over), ASTRID not paid separately when covered E/M service is provided on same date Men up to age 76 may need a screening blood test for prostate cancer at certain intervals, depending on their personal and family history. This decision is between the patient and his provider. Seasonal Influenza Vaccination (annually)   10/17 Pneumococcal Vaccination (once after 65)   7/16 p13 
p23 5/04 Hepatitis B Vaccinations (if medium/high risk) Medium/high risk factors:  End-stage renal disease, Hemophiliacs who received Factor VIII or IX concentrates, Clients of institutions for the mentally retarded, Persons who live in the same house as a HepB virus carrier, Homosexual men, Illicit injectable drug abusers. Shingles Vaccination A shingles vaccine is also recommended once in a lifetime after age 61 Ultrasound Screening for Abdominal Aortic Aneurysm (AAA) (once) Patient must be referred through IPPE and not have had a screening for abdominal aortic aneurysm before under Medicare. Limited to patients who meet one of the following criteria: 
- Men who are 73-68 years old and have smoked more than 100 cigarettes in their lifetime. 
-Anyone with a FH of AAA 
-Anyone recommended for screening by USPSTF A Healthy Lifestyle: Care Instructions Your Care Instructions A healthy lifestyle can help you feel good, stay at a healthy weight, and have plenty of energy for both work and play. A healthy lifestyle is something you can share with your whole family. A healthy lifestyle also can lower your risk for serious health problems, such as high blood pressure, heart disease, and diabetes. You can follow a few steps listed below to improve your health and the health of your family. Follow-up care is a key part of your treatment and safety. Be sure to make and go to all appointments, and call your doctor if you are having problems. It's also a good idea to know your test results and keep a list of the medicines you take. How can you care for yourself at home? · Do not eat too much sugar, fat, or fast foods. You can still have dessert and treats now and then. The goal is moderation. · Start small to improve your eating habits. Pay attention to portion sizes, drink less juice and soda pop, and eat more fruits and vegetables. ¨ Eat a healthy amount of food. A 3-ounce serving of meat, for example, is about the size of a deck of cards. Fill the rest of your plate with vegetables and whole grains. ¨ Limit the amount of soda and sports drinks you have every day. Drink more water when you are thirsty. ¨ Eat at least 5 servings of fruits and vegetables every day. It may seem like a lot, but it is not hard to reach this goal. A serving or helping is 1 piece of fruit, 1 cup of vegetables, or 2 cups of leafy, raw vegetables. Have an apple or some carrot sticks as an afternoon snack instead of a candy bar. Try to have fruits and/or vegetables at every meal. 
· Make exercise part of your daily routine. You may want to start with simple activities, such as walking, bicycling, or slow swimming. Try to be active 30 to 60 minutes every day. You do not need to do all 30 to 60 minutes all at once. For example, you can exercise 3 times a day for 10 or 20 minutes. Moderate exercise is safe for most people, but it is always a good idea to talk to your doctor before starting an exercise program. 
· Keep moving. Radha Folk the lawn, work in the garden, or Ma[employer]. Take the stairs instead of the elevator at work. · If you smoke, quit. People who smoke have an increased risk for heart attack, stroke, cancer, and other lung illnesses. Quitting is hard, but there are ways to boost your chance of quitting tobacco for good. ¨ Use nicotine gum, patches, or lozenges. ¨ Ask your doctor about stop-smoking programs and medicines. ¨ Keep trying. In addition to reducing your risk of diseases in the future, you will notice some benefits soon after you stop using tobacco. If you have shortness of breath or asthma symptoms, they will likely get better within a few weeks after you quit. · Limit how much alcohol you drink. Moderate amounts of alcohol (up to 2 drinks a day for men, 1 drink a day for women) are okay.  But drinking too much can lead to liver problems, high blood pressure, and other health problems. Family health If you have a family, there are many things you can do together to improve your health. · Eat meals together as a family as often as possible. · Eat healthy foods. This includes fruits, vegetables, lean meats and dairy, and whole grains. · Include your family in your fitness plan. Most people think of activities such as jogging or tennis as the way to fitness, but there are many ways you and your family can be more active. Anything that makes you breathe hard and gets your heart pumping is exercise. Here are some tips: 
¨ Walk to do errands or to take your child to school or the bus. ¨ Go for a family bike ride after dinner instead of watching TV. Where can you learn more? Go to http://lachelle-gi.info/. Enter T066 in the search box to learn more about \"A Healthy Lifestyle: Care Instructions. \" Current as of: December 7, 2017 Content Version: 11.7 © 1896-0742 Vdopia. Care instructions adapted under license by Bio[employer]atch (which disclaims liability or warranty for this information). If you have questions about a medical condition or this instruction, always ask your healthcare professional. [place] [address] any warranty or liability for your use of this information. Introducing [facility]! Dear Jose Antonio López: Thank you for requesting a Onfan account. Our records indicate that you already have an active Onfan account. You can access your account anytime at [email or web address] Did you know that you can access your hospital and ER discharge instructions at any time in Onfan? You can also review all of your test results from your hospital stay or ER visit. Additional Information If you have questions, please visit the Frequently Asked Questions section of the [employer] website at [email or web address] Remember, [employer] is NOT to be used for urgent needs. For medical emergencies, dial 911. Now available from your iPhone and Android! Please provide this summary of care documentation to your next provider. Your primary care clinician is listed as JOSEP WOODALL. If you have any questions after today's visit, please call [phone].

## 2018-07-26 NOTE — PROGRESS NOTES
Willy Aguilear is a 65 y.o.  male and presents with Annual Wellness Visit; Irregular Heart Beat; Hypertension; and Prostate Cancer      SUBJECTIVE:  Pt's HTN and Afib is well controlled on Cardizem. His cholesterol remains borderline controlled. He is currently a candidate for statin therapy but is currently declining using more medication. He will also try to exercise more to improve his cholesterol. His right knee stops him from walking so he will try to find something else to do for exercise. He is also taking care of his wife who has dementia which makes it difficult for him to do exercise. Pt continues to be followed by cardiology for h/o PAF    Patient is followed by urology for history of prostate cancer. He had external beam radiation in the past.    Respiratory ROS: negative for - shortness of breath  Cardiovascular ROS: negative for - chest pain    Current Outpatient Prescriptions   Medication Sig    dilTIAZem CD (CARDIZEM CD) 240 mg ER capsule TAKE 1 CAPSULE TWICE A DAY    latanoprost (XALATAN) 0.005 % ophthalmic solution Administer 1 Drop to both eyes daily.  brimonidine (ALPHAGAN) 0.2 % ophthalmic solution Administer 1 Drop to both eyes two (2) times a day.  aspirin 81 mg tablet Take 2 Tabs by mouth daily.  ZINC MTH/COPPER/SAW PALM/GNSG (PROSTATE HEALTH FORMULA PO) Take 1 Tab by mouth daily.  omega-3 fatty acids-vitamin e (FISH OIL) 1,000 mg Cap Take 1 Cap by mouth daily. No current facility-administered medications for this visit.           OBJECTIVE:  alert, well appearing, and in no distress  Visit Vitals    /66 (BP 1 Location: Left arm, BP Patient Position: Sitting)    Pulse 79    Temp 97.4 °F (36.3 °C) (Oral)    Resp 20    Ht 6' 2\" (1.88 m)    Wt 209 lb (94.8 kg)    SpO2 96%    BMI 26.83 kg/m2      well developed and well nourished  Neck - supple, no significant adenopathy  Chest - clear to auscultation, no wheezes, rales or rhonchi, symmetric air entry  Heart - normal rate, regular rhythm, normal S1, S2, no murmurs, rubs, clicks or gallops  Extremities - peripheral pulses normal, no pedal edema, no clubbing or cyanosis  Skin - normal coloration and turgor, no rashes, no suspicious skin lesions noted      Labs:   Lab Results   Component Value Date/Time    Cholesterol, total 153 01/18/2018 08:30 AM    HDL Cholesterol 50 01/18/2018 08:30 AM    LDL, calculated 94.2 01/18/2018 08:30 AM    Triglyceride 44 01/18/2018 08:30 AM    CHOL/HDL Ratio 3.1 01/18/2018 08:30 AM     Lab Results   Component Value Date/Time    AST (SGOT) 17 07/19/2018 07:11 AM    Alk. phosphatase 93 07/19/2018 07:11 AM     Lab Results   Component Value Date/Time    GFR est AA >60 07/19/2018 07:11 AM    GFR est non-AA 59 (L) 07/19/2018 07:11 AM    Creatinine 1.19 07/19/2018 07:11 AM    BUN 20 (H) 07/19/2018 07:11 AM    Sodium 143 07/19/2018 07:11 AM    Potassium 4.6 07/19/2018 07:11 AM    Chloride 108 07/19/2018 07:11 AM    CO2 29 07/19/2018 07:11 AM      Lab Results   Component Value Date/Time    Glucose 104 (H) 07/19/2018 07:11 AM        Assessment/Plan        ICD-10-CM ICD-9-CM    1. Medicare annual wellness visit, subsequent Z00.00 V70.0    2. Essential hypertension I10 401.9  well-controlled on Cardizem METABOLIC PANEL, COMPREHENSIVE   3. Paroxysmal atrial fibrillation (HCC) I48.0 427.31  stable on Cardizem patient followed by cardiology   4. Malignant neoplasm of prostate [facility]) [id number]  patient followed by urology                Follow-up Disposition:  Return in about 6 months (around 1/26/2019) for labs 1 week before. Reviewed plan of care. Patient has provided input and agrees with goals.

## 2019-01-17 ENCOUNTER — HOSPITAL ENCOUNTER (OUTPATIENT)
Dept: LAB | Age: 79
Discharge: HOME | End: 2019-01-17
Payer: MEDICARE

## 2019-01-17 DIAGNOSIS — I10 ESSENTIAL HYPERTENSION: ICD-10-CM

## 2019-01-17 LAB
ALBUMIN SERPL-MCNC: 3.9 G/DL (ref 3.4–5)
ALBUMIN/GLOB SERPL: 1.3 {RATIO} (ref 0.8–1.7)
ALP SERPL-CCNC: 91 U/L (ref 45–117)
ALT SERPL-CCNC: 28 U/L (ref 16–61)
ANION GAP SERPL CALC-SCNC: 4 MMOL/L (ref 3–18)
AST SERPL-CCNC: 24 U/L (ref 15–37)
BILIRUB SERPL-MCNC: 0.6 MG/DL (ref 0.2–1)
BUN SERPL-MCNC: 15 MG/DL (ref 7–18)
BUN/CREAT SERPL: 13 (ref 12–20)
CALCIUM SERPL-MCNC: 8.5 MG/DL (ref 8.5–10.1)
CHLORIDE SERPL-SCNC: 107 MMOL/L (ref 100–108)
CO2 SERPL-SCNC: 28 MMOL/L (ref 21–32)
CREAT SERPL-MCNC: 1.15 MG/DL (ref 0.6–1.3)
GLOBULIN SER CALC-MCNC: 3 G/DL (ref 2–4)
GLUCOSE SERPL-MCNC: 98 MG/DL (ref 74–99)
POTASSIUM SERPL-SCNC: 4.3 MMOL/L (ref 3.5–5.5)
PROT SERPL-MCNC: 6.9 G/DL (ref 6.4–8.2)
SODIUM SERPL-SCNC: 139 MMOL/L (ref 136–145)

## 2019-01-17 PROCEDURE — 36415 COLL VENOUS BLD VENIPUNCTURE: CPT

## 2019-01-17 PROCEDURE — 80053 COMPREHEN METABOLIC PANEL: CPT

## 2019-01-24 ENCOUNTER — OFFICE VISIT (OUTPATIENT)
Dept: FAMILY MEDICINE CLINIC | Age: 79
End: 2019-01-24

## 2019-01-24 VITALS
HEART RATE: 74 BPM | OXYGEN SATURATION: 95 % | DIASTOLIC BLOOD PRESSURE: 71 MMHG | SYSTOLIC BLOOD PRESSURE: 129 MMHG | RESPIRATION RATE: 20 BRPM | BODY MASS INDEX: 27.85 KG/M2 | HEIGHT: 74 IN | WEIGHT: 217 LBS | TEMPERATURE: 97.4 F

## 2019-01-24 DIAGNOSIS — I10 ESSENTIAL HYPERTENSION: Primary | ICD-10-CM

## 2019-01-24 DIAGNOSIS — I48.0 PAROXYSMAL ATRIAL FIBRILLATION (HCC): ICD-10-CM

## 2019-01-24 NOTE — PROGRESS NOTES
Mohsen Leroy is a 77 y.o.  male and presents with Hypertension and Irregular Heart Beat      SUBJECTIVE:  Pt's HTN and Afib is well controlled on Cardizem. His cholesterol remains borderline controlled. He is currently a candidate for statin therapy but is currently declining using more medication. He will also try to exercise more to improve his cholesterol. His right knee stops him from walking so he will try to find something else to do for exercise. He is also taking care of his wife who has dementia which makes it difficult for him to do exercise. Pt continues to be followed by cardiology for h/o PAF    Patient is followed by urology for history of prostate cancer. He had external beam radiation in the past.    Respiratory ROS: negative for - shortness of breath  Cardiovascular ROS: negative for - chest pain    Current Outpatient Medications   Medication Sig    dilTIAZem CD (CARDIZEM CD) 240 mg ER capsule TAKE 1 CAPSULE TWICE A DAY    latanoprost (XALATAN) 0.005 % ophthalmic solution Administer 1 Drop to both eyes daily.  brimonidine (ALPHAGAN) 0.2 % ophthalmic solution Administer 1 Drop to both eyes two (2) times a day.  aspirin 81 mg tablet Take 2 Tabs by mouth daily.  ZINC MTH/COPPER/SAW PALM/GNSG (PROSTATE HEALTH FORMULA PO) Take 1 Tab by mouth daily.  omega-3 fatty acids-vitamin e (FISH OIL) 1,000 mg Cap Take 1 Cap by mouth daily. No current facility-administered medications for this visit.           OBJECTIVE:  alert, well appearing, and in no distress  Visit Vitals  /71 (BP 1 Location: Left arm, BP Patient Position: Sitting)   Pulse 74   Temp 97.4 °F (36.3 °C) (Oral)   Resp 20   Ht 6' 2\" (1.88 m)   Wt 217 lb (98.4 kg)   SpO2 95%   BMI 27.86 kg/m²      well developed and well nourished  Neck - supple, no significant adenopathy  Chest - clear to auscultation, no wheezes, rales or rhonchi, symmetric air entry  Heart - normal rate, regular rhythm, normal S1, S2, no murmurs, rubs, clicks or gallops  Extremities - peripheral pulses normal, no pedal edema, no clubbing or cyanosis  Skin - normal coloration and turgor, no rashes, no suspicious skin lesions noted      Labs:   Lab Results   Component Value Date/Time    Cholesterol, total 153 01/18/2018 08:30 AM    HDL Cholesterol 50 01/18/2018 08:30 AM    LDL, calculated 94.2 01/18/2018 08:30 AM    Triglyceride 44 01/18/2018 08:30 AM    CHOL/HDL Ratio 3.1 01/18/2018 08:30 AM     Lab Results   Component Value Date/Time    AST (SGOT) 24 01/17/2019 08:31 AM    Alk. phosphatase 91 01/17/2019 08:31 AM     Lab Results   Component Value Date/Time    GFR est AA >60 01/17/2019 08:31 AM    GFR est non-AA >60 01/17/2019 08:31 AM    Creatinine 1.15 01/17/2019 08:31 AM    BUN 15 01/17/2019 08:31 AM    Sodium 139 01/17/2019 08:31 AM    Potassium 4.3 01/17/2019 08:31 AM    Chloride 107 01/17/2019 08:31 AM    CO2 28 01/17/2019 08:31 AM      Lab Results   Component Value Date/Time    Glucose 98 01/17/2019 08:31 AM        Assessment/Plan      ICD-10-CM ICD-9-CM    1. Essential hypertension I10 401.9 Well controlled on Cardizem LIPID PANEL      METABOLIC PANEL, COMPREHENSIVE   2. Paroxysmal atrial fibrillation (HCC) I48.0 427.31  well-controlled on Cardizem and aspirin 81 mg daily. Patient followed by cardiology                Follow-up Disposition:  Return in about 6 months (around 7/24/2019) for OV, and Medicare Wellness Visit, labs 1 week before. Reviewed plan of care. Patient has provided input and agrees with goals.

## 2019-01-24 NOTE — PATIENT INSTRUCTIONS
High Blood Pressure: Care Instructions  Overview    It's normal for blood pressure to go up and down throughout the day. But if it stays up, you have high blood pressure. Another name for high blood pressure is hypertension. Despite what a lot of people think, high blood pressure usually doesn't cause headaches or make you feel dizzy or lightheaded. It usually has no symptoms. But it does increase your risk of stroke, heart attack, and other problems. You and your doctor will talk about your risks of these problems based on your blood pressure. Your doctor will give you a goal for your blood pressure. Your goal will be based on your health and your age. Lifestyle changes, such as eating healthy and being active, are always important to help lower blood pressure. You might also take medicine to reach your blood pressure goal.  Follow-up care is a key part of your treatment and safety. Be sure to make and go to all appointments, and call your doctor if you are having problems. It's also a good idea to know your test results and keep a list of the medicines you take. How can you care for yourself at home? Medical treatment  · If you stop taking your medicine, your blood pressure will go back up. You may take one or more types of medicine to lower your blood pressure. Be safe with medicines. Take your medicine exactly as prescribed. Call your doctor if you think you are having a problem with your medicine. · Talk to your doctor before you start taking aspirin every day. Aspirin can help certain people lower their risk of a heart attack or stroke. But taking aspirin isn't right for everyone, because it can cause serious bleeding. · See your doctor regularly. You may need to see the doctor more often at first or until your blood pressure comes down. · If you are taking blood pressure medicine, talk to your doctor before you take decongestants or anti-inflammatory medicine, such as ibuprofen.  Some of these medicines can raise blood pressure. · Learn how to check your blood pressure at home. Lifestyle changes  · Stay at a healthy weight. This is especially important if you put on weight around the waist. Losing even 10 pounds can help you lower your blood pressure. · If your doctor recommends it, get more exercise. Walking is a good choice. Bit by bit, increase the amount you walk every day. Try for at least 30 minutes on most days of the week. You also may want to swim, bike, or do other activities. · Avoid or limit alcohol. Talk to your doctor about whether you can drink any alcohol. · Try to limit how much sodium you eat to less than 2,300 milligrams (mg) a day. Your doctor may ask you to try to eat less than 1,500 mg a day. · Eat plenty of fruits (such as bananas and oranges), vegetables, legumes, whole grains, and low-fat dairy products. · Lower the amount of saturated fat in your diet. Saturated fat is found in animal products such as milk, cheese, and meat. Limiting these foods may help you lose weight and also lower your risk for heart disease. · Do not smoke. Smoking increases your risk for heart attack and stroke. If you need help quitting, talk to your doctor about stop-smoking programs and medicines. These can increase your chances of quitting for good. When should you call for help? Call 911 anytime you think you may need emergency care. This may mean having symptoms that suggest that your blood pressure is causing a serious heart or blood vessel problem. Your blood pressure may be over 180/120.   For example, call 911 if:    · You have symptoms of a heart attack. These may include:  ? Chest pain or pressure, or a strange feeling in the chest.  ? Sweating. ? Shortness of breath. ? Nausea or vomiting. ? Pain, pressure, or a strange feeling in the back, neck, jaw, or upper belly or in one or both shoulders or arms. ? Lightheadedness or sudden weakness.   ? A fast or irregular heartbeat.     · You have symptoms of a stroke. These may include:  ? Sudden numbness, tingling, weakness, or loss of movement in your face, arm, or leg, especially on only one side of your body. ? Sudden vision changes. ? Sudden trouble speaking. ? Sudden confusion or trouble understanding simple statements. ? Sudden problems with walking or balance. ? A sudden, severe headache that is different from past headaches.     · You have severe back or belly pain.    Do not wait until your blood pressure comes down on its own. Get help right away.   Call your doctor now or seek immediate care if:    · Your blood pressure is much higher than normal (such as 180/120 or higher), but you don't have symptoms.     · You think high blood pressure is causing symptoms, such as:  ? Severe headache.  ? Blurry vision.    Watch closely for changes in your health, and be sure to contact your doctor if:    · Your blood pressure measures higher than your doctor recommends at least 2 times. That means the top number is higher or the bottom number is higher, or both.     · You think you may be having side effects from your blood pressure medicine. Where can you learn more? Go to http://lachelle-gi.info/. Enter B683 in the search box to learn more about \"High Blood Pressure: Care Instructions. \"  Current as of: July 22, 2018  Content Version: 11.9  © 8188-6408 [employer]. Care instructions adapted under license by [employer] (which disclaims liability or warranty for this information). If you have questions about a medical condition or this instruction, always ask your healthcare professional. [place] [address] any warranty or liability for your use of this information.

## 2019-01-24 NOTE — PROGRESS NOTES
Patient is in the office today for 6 month follow up. 1. Have you been to the ER, urgent care clinic since your last visit? Hospitalized since your last visit? No    2. Have you seen or consulted any other health care providers outside of the [address] [place] [address] since your last visit? Include any pap smears or colon screening.  No

## 2019-01-30 ENCOUNTER — OFFICE VISIT (OUTPATIENT)
Dept: CARDIOLOGY CLINIC | Age: 79
End: 2019-01-30

## 2019-01-30 VITALS
SYSTOLIC BLOOD PRESSURE: 130 MMHG | WEIGHT: 219 LBS | HEART RATE: 75 BPM | BODY MASS INDEX: 28.11 KG/M2 | DIASTOLIC BLOOD PRESSURE: 70 MMHG | HEIGHT: 74 IN | OXYGEN SATURATION: 97 %

## 2019-01-30 DIAGNOSIS — E78.5 DYSLIPIDEMIA: ICD-10-CM

## 2019-01-30 DIAGNOSIS — I48.0 PAROXYSMAL ATRIAL FIBRILLATION (HCC): Primary | ICD-10-CM

## 2019-01-30 DIAGNOSIS — R07.9 CHEST PAIN, UNSPECIFIED TYPE: ICD-10-CM

## 2019-01-30 DIAGNOSIS — I10 ESSENTIAL HYPERTENSION: ICD-10-CM

## 2019-01-30 DIAGNOSIS — Z95.0 CARDIAC PACEMAKER IN SITU: ICD-10-CM

## 2019-01-30 DIAGNOSIS — I49.5 SICK SINUS SYNDROME (HCC): ICD-10-CM

## 2019-01-30 NOTE — PROGRESS NOTES
Silvanoarun Khoury presents today for   Chief Complaint   Patient presents with    Irregular Heart Beat     6 month follow up        Silvano Khoury preferred language for health care discussion is english/other. Is someone accompanying this pt? No     Is the patient using any DME equipment during OV? No     Depression Screening:  PHQ over the last two weeks 7/26/2018   Little interest or pleasure in doing things Not at all   Feeling down, depressed, irritable, or hopeless Not at all   Total Score PHQ 2 0       Learning Assessment:  Learning Assessment 6/25/2018   PRIMARY LEARNER Patient   HIGHEST LEVEL OF EDUCATION - PRIMARY LEARNER  -   BARRIERS PRIMARY LEARNER -   CO-LEARNER CAREGIVER -   PRIMARY LANGUAGE ENGLISH   LEARNER PREFERENCE PRIMARY DEMONSTRATION     -   ANSWERED BY Patient   RELATIONSHIP SELF       Abuse Screening:  Abuse Screening Questionnaire 7/26/2018   Do you ever feel afraid of your partner? N   Are you in a relationship with someone who physically or mentally threatens you? N   Is it safe for you to go home? Y       Fall Risk  Fall Risk Assessment, last 12 mths 7/26/2018   Able to walk? Yes   Fall in past 12 months? Yes   Fall with injury? No   Number of falls in past 12 months 1   Fall Risk Score 1       Pt currently taking Anticoagulant therapy? ASA 81mg BID     Coordination of Care:  1. Have you been to the ER, urgent care clinic since your last visit? Hospitalized since your last visit? No     2. Have you seen or consulted any other health care providers outside of the [address] [place] [address] [place] since your last visit? Include any pap smears or colon screening.  No

## 2019-01-30 NOTE — PROGRESS NOTES
HISTORY OF PRESENT ILLNESS  Chato Duggan is a 77 y.o. male. Chest Pain (Angina)          Patient presents for a scheduled followup visit. He has a past medical history significant for paroxysmal atrial fibrillation, hypertension, and sick sinus syndrome, status post dual-chamber permanent pacemaker in March 2011. The patient was last seen in the office 6 months ago. Since last visit, the patient has been having increasing episodes of chest tightness over the left side of his chest which usually occur at rest.  They can last for several minutes at a time before subsiding. It is been more noticeable more frequent over the past 2 months. No associated shortness of breath, dizziness, palpitations or diaphoresis. He admits that he is not very active since he has been taking care of his wife with dementia. He just recently obtained help at home, so we will try to start exercising more frequently. Past Medical History:   Diagnosis Date    Atrial fibrillation (HCC)     CHADS 0  (-CHF, -HTN, -AGE, -DM, -CVA)    Cardiac echocardiogram 06/29/2010    EF 70-75%. Mild conc LVh. Gr 1 DDfx. Mild DELMY.  Cardiac Holter monitor, normal 06/28/2010    Normal Holter study.  Cardiac nuclear imaging test 11/22/2010    Sm area of apical ischemia and inferolateral scar, both possibly apical thinning. No WMA. EF 60%.  Impotence of organic origin     Pacemaker     3/11  [employer]TR[employer]IC    Personal history of malignant neoplasm of prostate     T1a, [place] 6 (3+3)     [facility]state [facility] [address] [facility]     PUD (peptic ulcer disease)     Sick sinus syndrome ([facility] [address] [facility]     status post implantation of Med[employer] dual-chamber permanent pacemaker 3/22/11.     Unspecified essential hypertension     Venereal disease       Current Outpatient Medications   Medication Sig Dispense Refill    dilTIAZem CD (CARDIZEM CD) 240 mg ER capsule TAKE 1 CAPSULE TWICE A  Cap 3    latanoprost (XALATAN) 0.005 % ophthalmic solution Administer 1 Drop to both eyes daily.  brimonidine (ALPHAGAN) 0.2 % ophthalmic solution Administer 1 Drop to both eyes two (2) times a day.  aspirin 81 mg tablet Take 2 Tabs by mouth daily. 1 Tab 0    ZINC MTH/COPPER/SAW PALM/GNSG (PROSTATE HEALTH FORMULA PO) Take 1 Tab by mouth daily.  omega-3 fatty acids-vitamin e (FISH OIL) 1,000 mg Cap Take 1 Cap by mouth daily. Allergies   Allergen Reactions    Shellfish Containing Products Swelling      Social History     Tobacco Use    Smoking status: Former Smoker     Packs/day: 0.25     Years: 1.00     Pack years: 0.25     Last attempt to quit: 1981     Years since quittin.8    Smokeless tobacco: Never Used   Substance Use Topics    Alcohol use: No    Drug use: No         Review of Systems   Constitutional: Negative for chills and weight loss. HENT: Negative for nosebleeds. Eyes: Negative for blurred vision and double vision. Respiratory: Negative for wheezing. Cardiovascular: Negative for leg swelling. Gastrointestinal: Negative for heartburn. Genitourinary: Negative for dysuria and hematuria. Musculoskeletal: Negative for falls and myalgias. Skin: Negative for rash. Neurological: Negative for focal weakness. Endo/Heme/Allergies: Does not bruise/bleed easily. Psychiatric/Behavioral: Negative for substance abuse. Visit Vitals  /70   Pulse 75   Ht 6' 2\" (1.88 m)   Wt 99.3 kg (219 lb)   SpO2 97%   BMI 28.12 kg/m²      Physical Exam   Constitutional: He is oriented to person, place, and time. He appears well-developed and well-nourished. HENT:   Head: Normocephalic and atraumatic. Eyes: Conjunctivae are normal.   Neck: Neck supple. No JVD present. Carotid bruit is not present. Cardiovascular: Normal rate, regular rhythm, S1 normal, S2 normal and normal pulses. Exam reveals no gallop and no S3. No murmur heard. Pulmonary/Chest: Breath sounds normal. He has no wheezes. He has no rales. Abdominal: Soft.  Bowel sounds are normal. There is no tenderness. Musculoskeletal: He exhibits no edema. Neurological: He is alert and oriented to person, place, and time. Skin: Skin is warm and dry. EKG: Atrial paced rhythm, intrinsic QRS conduction with an incomplete right bundle branch block, no ST or T wave abnormalities. No change compared to the previous EKG. Pacemaker interrogation. Battery life estimated at 2.5 years. 2 days of intermittent atrial fibrillation in November 2018, longest episode lasted for nearly 8 hours. Average heart rate in the 70s. .  Pacing in the atrium 98%, and in the ventricle 0%. Scanned document for details. ASSESSMENT and PLAN    Paroxysmal atrial fibrillation. 2 days of intermittent atrial fibrillation in November 2018 longest episode lasted for nearly 8 hours. He had not had any atrial fibrillation in several years. I would continue his regimen of Cardizem and aspirin for now. If he does have more frequent episodes, I would like to switch his aspirin over to Eliquis for oral anticoagulation. Sick sinus syndrome. Status post dual-chamber permanent pacemaker. Normal device function on interrogation. The patient continues to pace the majority of the time in the atrium and very little in the ventricle. Battery life estimated at 2.5 years. Essential hypertension. Patient blood pressure is well controlled on diltiazem as monotherapy. Dyslipidemia. This is only been mildly elevated in the past.  This is followed closely by his PCP. The patient works on lifestyle modification. Chest pain. Increased frequency over the past 2 months. I recommended  an exercise nuclear stress test for further risk stratification. As long as his stress test is unremarkable, patient can followup in 6 months, sooner if needed.

## 2019-02-04 ENCOUNTER — HOSPITAL ENCOUNTER (OUTPATIENT)
Dept: NON INVASIVE DIAGNOSTICS | Age: 79
Discharge: HOME | End: 2019-02-04
Attending: INTERNAL MEDICINE
Payer: MEDICARE

## 2019-02-04 VITALS
SYSTOLIC BLOOD PRESSURE: 122 MMHG | BODY MASS INDEX: 28.11 KG/M2 | DIASTOLIC BLOOD PRESSURE: 70 MMHG | WEIGHT: 219 LBS | HEIGHT: 74 IN

## 2019-02-04 DIAGNOSIS — R07.9 CHEST PAIN, UNSPECIFIED TYPE: ICD-10-CM

## 2019-02-04 DIAGNOSIS — I48.0 PAROXYSMAL ATRIAL FIBRILLATION (HCC): ICD-10-CM

## 2019-02-04 LAB
STRESS ANGINA INDEX: 0
STRESS BASELINE DIAS BP: 70 MMHG
STRESS BASELINE HR: 62 BPM
STRESS BASELINE SYS BP: 122 MMHG
STRESS ESTIMATED WORKLOAD: 10.1 METS
STRESS EXERCISE DUR MIN: NORMAL
STRESS PEAK DIAS BP: 60 MMHG
STRESS PEAK SYS BP: 160 MMHG
STRESS PERCENT HR ACHIEVED: 106 %
STRESS POST PEAK HR: 127 BPM
STRESS RATE PRESSURE PRODUCT: NORMAL BPM*MMHG
STRESS SR DUKE TREADMILL SCORE: 0
STRESS ST DEPRESSION: 0 MM
STRESS ST ELEVATION: 0 MM
STRESS TARGET HR: 120 BPM

## 2019-02-04 PROCEDURE — 74011250636 HC RX REV CODE- 250/636: Performed by: INTERNAL MEDICINE

## 2019-02-04 PROCEDURE — 93017 CV STRESS TEST TRACING ONLY: CPT

## 2019-02-04 RX ORDER — SODIUM CHLORIDE 9 MG/ML
250 INJECTION, SOLUTION INTRAVENOUS ONCE
Status: COMPLETED | OUTPATIENT
Start: 2019-02-04 | End: 2019-02-04

## 2019-02-04 RX ADMIN — SODIUM CHLORIDE 250 ML: 900 INJECTION, SOLUTION INTRAVENOUS at 09:45

## 2019-02-04 NOTE — PROGRESS NOTES
Per your last note\"Chest pain. Increased frequency over the past 2 months. I recommended  an exercise nuclear stress test for further risk stratification.

## 2019-02-04 NOTE — PROGRESS NOTES
Patient was given 10.95 milliCuries of 99mTc-Sestamibi for the resting images. Patient was also given 33.0 milliCuries of 99mTc-Sestamibi for the stress images. Prone images were acquired. Patient walked on treadmill during nuclear stress test.    Patient's armband was discarded and shredded.

## 2019-02-12 ENCOUNTER — TELEPHONE (OUTPATIENT)
Dept: CARDIOLOGY CLINIC | Age: 79
End: 2019-02-12

## 2019-02-12 NOTE — TELEPHONE ENCOUNTER
----- Message from Charli Cheema MD sent at 2/6/2019  4:05 PM EST -----  Please let the patient know that his nuclear stress test was normal.  ----- Message -----  From: Karen De Luna LPN  Sent: 4/1/6725   3:17 PM  To: Charli Cheema MD    Per your last note\"Chest pain. Increased frequency over the past 2 months.   I recommended  an exercise nuclear stress test for further risk stratification.

## 2019-05-02 ENCOUNTER — OFFICE VISIT (OUTPATIENT)
Dept: FAMILY MEDICINE CLINIC | Age: 79
End: 2019-05-02

## 2019-05-02 VITALS
WEIGHT: 211.2 LBS | TEMPERATURE: 97.7 F | SYSTOLIC BLOOD PRESSURE: 120 MMHG | RESPIRATION RATE: 18 BRPM | OXYGEN SATURATION: 97 % | HEIGHT: 74 IN | DIASTOLIC BLOOD PRESSURE: 71 MMHG | BODY MASS INDEX: 27.11 KG/M2 | HEART RATE: 73 BPM

## 2019-05-02 DIAGNOSIS — S39.012A STRAIN OF LUMBAR REGION, INITIAL ENCOUNTER: Primary | ICD-10-CM

## 2019-05-02 NOTE — PROGRESS NOTES
Chief Complaint   Patient presents with    Back Pain     lower back pain x -3 months     1. Have you been to the ER, urgent care clinic since your last visit? Hospitalized since your last visit? No    2. Have you seen or consulted any other health care providers outside of the [address] [place] [address] since your last visit? Include any pap smears or colon screening.  No

## 2019-05-02 NOTE — PATIENT INSTRUCTIONS

## 2019-05-02 NOTE — PROGRESS NOTES
Candace Madrigal is a 77 y.o.  male and presents with Back Pain      SUBJECTIVE:    Back Pain  Patient presents for presents evaluation of low back problems. Symptoms have been present for 3 months and include pain in lower back (aching in character; 2/10 in severity). Initial inciting event when pt has to lift things in his home. Symptoms are worst: morning. Alleviating factors identifiable by patient are recumbency. Exacerbating factors identifiable by patient are walking. Treatments so far initiated by patient: tylenol Previous lower back problems: none. Previous workup: none. Previous treatments: as above. Respiratory ROS: negative for - shortness of breath  Cardiovascular ROS: negative for - chest pain    Current Outpatient Medications   Medication Sig    fish oil-omega-3 fatty acids 340-1,000 mg capsule Take 1,000 mg by mouth.  dilTIAZem CD (CARDIZEM CD) 240 mg ER capsule TAKE 1 CAPSULE TWICE A DAY    latanoprost (XALATAN) 0.005 % ophthalmic solution Administer 1 Drop to both eyes daily.  brimonidine (ALPHAGAN) 0.2 % ophthalmic solution Administer 1 Drop to both eyes two (2) times a day.  aspirin 81 mg tablet Take 2 Tabs by mouth daily.  ZINC MTH/COPPER/SAW PALM/GNSG (PROSTATE HEALTH FORMULA PO) Take 1 Tab by mouth daily.  omega-3 fatty acids-vitamin e (FISH OIL) 1,000 mg Cap Take 1 Cap by mouth daily. No current facility-administered medications for this visit. OBJECTIVE:  alert, well appearing, and in no distress  Visit Vitals  /71 (BP 1 Location: Left arm, BP Patient Position: Sitting)   Pulse 73   Temp 97.7 °F (36.5 °C) (Oral)   Resp 18   Ht 6' 2\" (1.88 m)   Wt 211 lb 3.2 oz (95.8 kg)   SpO2 97%   BMI 27.12 kg/m²      well developed and well nourished  Back exam - pain with motion noted during exam, tenderness noted in right paraspinal muscles in lumbar area.  Negative straight leg bilaterally         Assessment/Plan      ICD-10-CM ICD-9-CM    1. Strain of lumbar region, initial encounter S39.012A 847.2 Pt will try home exercises which he was given today. If no improvement consider PT      Follow-up and Dispositions    · Return if symptoms worsen or fail to improve. Reviewed plan of care. Patient has provided input and agrees with goals.

## 2019-07-18 ENCOUNTER — HOSPITAL ENCOUNTER (OUTPATIENT)
Dept: LAB | Age: 79
Discharge: HOME | End: 2019-07-18
Payer: MEDICARE

## 2019-07-18 DIAGNOSIS — I10 ESSENTIAL HYPERTENSION: ICD-10-CM

## 2019-07-18 LAB
ALBUMIN SERPL-MCNC: 3.9 G/DL (ref 3.4–5)
ALBUMIN/GLOB SERPL: 1.3 {RATIO} (ref 0.8–1.7)
ALP SERPL-CCNC: 90 U/L (ref 45–117)
ALT SERPL-CCNC: 27 U/L (ref 16–61)
ANION GAP SERPL CALC-SCNC: 6 MMOL/L (ref 3–18)
AST SERPL-CCNC: 22 U/L (ref 10–38)
BILIRUB SERPL-MCNC: 0.6 MG/DL (ref 0.2–1)
BUN SERPL-MCNC: 16 MG/DL (ref 7–18)
BUN/CREAT SERPL: 13 (ref 12–20)
CALCIUM SERPL-MCNC: 9 MG/DL (ref 8.5–10.1)
CHLORIDE SERPL-SCNC: 107 MMOL/L (ref 100–111)
CHOLEST SERPL-MCNC: 164 MG/DL
CO2 SERPL-SCNC: 28 MMOL/L (ref 21–32)
CREAT SERPL-MCNC: 1.22 MG/DL (ref 0.6–1.3)
GLOBULIN SER CALC-MCNC: 2.9 G/DL (ref 2–4)
GLUCOSE SERPL-MCNC: 95 MG/DL (ref 74–99)
HDLC SERPL-MCNC: 51 MG/DL (ref 40–60)
HDLC SERPL: 3.2 {RATIO} (ref 0–5)
LDLC SERPL CALC-MCNC: 102.2 MG/DL (ref 0–100)
LIPID PROFILE,FLP: ABNORMAL
POTASSIUM SERPL-SCNC: 4.6 MMOL/L (ref 3.5–5.5)
PROT SERPL-MCNC: 6.8 G/DL (ref 6.4–8.2)
SODIUM SERPL-SCNC: 141 MMOL/L (ref 136–145)
TRIGL SERPL-MCNC: 54 MG/DL (ref ?–150)
VLDLC SERPL CALC-MCNC: 10.8 MG/DL

## 2019-07-18 PROCEDURE — 80053 COMPREHEN METABOLIC PANEL: CPT

## 2019-07-18 PROCEDURE — 80061 LIPID PANEL: CPT

## 2019-07-18 PROCEDURE — 36415 COLL VENOUS BLD VENIPUNCTURE: CPT

## 2019-07-25 ENCOUNTER — OFFICE VISIT (OUTPATIENT)
Dept: FAMILY MEDICINE CLINIC | Age: 79
End: 2019-07-25

## 2019-07-25 VITALS
HEART RATE: 76 BPM | OXYGEN SATURATION: 97 % | BODY MASS INDEX: 26.69 KG/M2 | SYSTOLIC BLOOD PRESSURE: 129 MMHG | RESPIRATION RATE: 20 BRPM | WEIGHT: 208 LBS | TEMPERATURE: 98 F | HEIGHT: 74 IN | DIASTOLIC BLOOD PRESSURE: 68 MMHG

## 2019-07-25 DIAGNOSIS — Z00.00 MEDICARE ANNUAL WELLNESS VISIT, SUBSEQUENT: Primary | ICD-10-CM

## 2019-07-25 DIAGNOSIS — I48.0 PAROXYSMAL ATRIAL FIBRILLATION (HCC): ICD-10-CM

## 2019-07-25 DIAGNOSIS — I10 ESSENTIAL HYPERTENSION: ICD-10-CM

## 2019-07-25 NOTE — PATIENT INSTRUCTIONS
High Blood Pressure: Care Instructions  Overview    It's normal for blood pressure to go up and down throughout the day. But if it stays up, you have high blood pressure. Another name for high blood pressure is hypertension. Despite what a lot of people think, high blood pressure usually doesn't cause headaches or make you feel dizzy or lightheaded. It usually has no symptoms. But it does increase your risk of stroke, heart attack, and other problems. You and your doctor will talk about your risks of these problems based on your blood pressure. Your doctor will give you a goal for your blood pressure. Your goal will be based on your health and your age. Lifestyle changes, such as eating healthy and being active, are always important to help lower blood pressure. You might also take medicine to reach your blood pressure goal.  Follow-up care is a key part of your treatment and safety. Be sure to make and go to all appointments, and call your doctor if you are having problems. It's also a good idea to know your test results and keep a list of the medicines you take. How can you care for yourself at home? Medical treatment  · If you stop taking your medicine, your blood pressure will go back up. You may take one or more types of medicine to lower your blood pressure. Be safe with medicines. Take your medicine exactly as prescribed. Call your doctor if you think you are having a problem with your medicine. · Talk to your doctor before you start taking aspirin every day. Aspirin can help certain people lower their risk of a heart attack or stroke. But taking aspirin isn't right for everyone, because it can cause serious bleeding. · See your doctor regularly. You may need to see the doctor more often at first or until your blood pressure comes down. · If you are taking blood pressure medicine, talk to your doctor before you take decongestants or anti-inflammatory medicine, such as ibuprofen.  Some of these medicines can raise blood pressure. · Learn how to check your blood pressure at home. Lifestyle changes  · Stay at a healthy weight. This is especially important if you put on weight around the waist. Losing even 10 pounds can help you lower your blood pressure. · If your doctor recommends it, get more exercise. Walking is a good choice. Bit by bit, increase the amount you walk every day. Try for at least 30 minutes on most days of the week. You also may want to swim, bike, or do other activities. · Avoid or limit alcohol. Talk to your doctor about whether you can drink any alcohol. · Try to limit how much sodium you eat to less than 2,300 milligrams (mg) a day. Your doctor may ask you to try to eat less than 1,500 mg a day. · Eat plenty of fruits (such as bananas and oranges), vegetables, legumes, whole grains, and low-fat dairy products. · Lower the amount of saturated fat in your diet. Saturated fat is found in animal products such as milk, cheese, and meat. Limiting these foods may help you lose weight and also lower your risk for heart disease. · Do not smoke. Smoking increases your risk for heart attack and stroke. If you need help quitting, talk to your doctor about stop-smoking programs and medicines. These can increase your chances of quitting for good. When should you call for help? Call 911 anytime you think you may need emergency care. This may mean having symptoms that suggest that your blood pressure is causing a serious heart or blood vessel problem. Your blood pressure may be over 180/120.   For example, call 911 if:    · You have symptoms of a heart attack. These may include:  ? Chest pain or pressure, or a strange feeling in the chest.  ? Sweating. ? Shortness of breath. ? Nausea or vomiting. ? Pain, pressure, or a strange feeling in the back, neck, jaw, or upper belly or in one or both shoulders or arms. ? Lightheadedness or sudden weakness.   ? A fast or irregular heartbeat.     · You have symptoms of a stroke. These may include:  ? Sudden numbness, tingling, weakness, or loss of movement in your face, arm, or leg, especially on only one side of your body. ? Sudden vision changes. ? Sudden trouble speaking. ? Sudden confusion or trouble understanding simple statements. ? Sudden problems with walking or balance. ? A sudden, severe headache that is different from past headaches.     · You have severe back or belly pain.    Do not wait until your blood pressure comes down on its own. Get help right away.   Call your doctor now or seek immediate care if:    · Your blood pressure is much higher than normal (such as 180/120 or higher), but you don't have symptoms.     · You think high blood pressure is causing symptoms, such as:  ? Severe headache.  ? Blurry vision.    Watch closely for changes in your health, and be sure to contact your doctor if:    · Your blood pressure measures higher than your doctor recommends at least 2 times. That means the top number is higher or the bottom number is higher, or both.     · You think you may be having side effects from your blood pressure medicine. Where can you learn more? Go to http://lachelle-gi.info/. Enter U101 in the search box to learn more about \"High Blood Pressure: Care Instructions. \"  Current as of: July 22, 2018  Content Version: 12.1  © 7502-0664 [employer]. Care instructions adapted under license by [employer] (which disclaims liability or warranty for this information). If you have questions about a medical condition or this instruction, always ask your healthcare professional. [place] [address] any warranty or liability for your use of this information. Medicare Part B Preventive Services Limitations Recommendation Scheduled   Bone Mass Measurement  (age 72 & older, biennial) Requires diagnosis related to osteoporosis or estrogen deficiency.  Biennial benefit unless patient has history of long-term glucocorticoid tx or baseline is needed because initial test was by other method     Cardiovascular Screening Blood Tests (every 5 years)  Total cholesterol, HDL, Triglycerides and ECG Order blood work  as a panel if possible and  for adults with routine risk  an electrocardiogram (ECG) at intervals determined by the provider. Lipid 7/19  cmp 7/19   Colorectal Cancer Screening  -Fecal occult blood test (annual)  -Flexible sigmoidoscopy (5y)  -Screening colonoscopy (10y)  -Barium Enema Colorectal cancer screening should be done for adults age 54-65 with no increased risk factors for colorectal cancer. There are a number of acceptable methods of screening for this type of cancer. Each test has its own benefits and drawbacks. Discuss with your provider what is most appropriate for you during your annual wellness visit. The different tests include: colonoscopy (considered the best screening method), a fecal occult blood test, a fecal DNA test, and sigmoidoscopy  10/14   Counseling to Prevent Tobacco Use (up to 8 sessions per year)  - Counseling greater than 3 and up to 10 minutes  - Counseling greater than 10 minutes Patients must be asymptomatic of tobacco-related conditions to receive as preventive service     Diabetes Screening Tests (at least every 3 years, Medicare covers annually or at 6-month intervals for prediabetic patients)    Fasting blood sugar (FBS) or glucose tolerance test (GTT) All adults age 38-68 who are overweight should have a diabetes screening test once every three years.   -Other screening tests & preventive services for persons with diabetes include: an eye exam to screen for diabetic retinopathy, a kidney function test, a foot exam, and stricter control over your cholesterol. Diabetes Self-Management Training (DSMT) (no USPSTF recommendation) Requires referral by treating physician for patient with diabetes or renal disease.  10 hours of initial DSMT session of no less than 30 minutes each in a continuous 12-month period. 2 hours of follow-up DSMT in subsequent years. Glaucoma Screening (no USPSTF recommendation) Diabetes mellitus, family history, , age 48 or over,  American, age 72 or over  9/17   Human Immunodeficiency Virus (HIV) Screening (annually for increased risk patients)  HIV-1 and HIV-2 by EIA, MICHAEL, rapid antibody test, or oral mucosa transudate Patient must be at increased risk for HIV infection per USPSTF guidelines or pregnant. Tests covered annually for patients at increased risk. Pregnant patients may receive up to 3 test during pregnancy. Medical Nutrition Therapy (MNT) (for diabetes or renal disease not recommended schedule) Requires referral by treating physician for patient with diabetes or renal disease. Can be provided in same year as diabetes self-management training (DSMT), and CMS recommends medical nutrition therapy take place after DSMT. Up to 3 hours for initial year and 2 hours in subsequent years. Prostate Cancer Screening (annually up to age 76)  - Digital rectal exam (ASTRID)  - Prostate specific antigen (PSA) Annually (age 48 or over), ASTRID not paid separately when covered E/M service is provided on same date  Men up to age 76 may need a screening blood test for prostate cancer at certain intervals, depending on their personal and family history. This decision is between the patient and his provider. Seasonal Influenza Vaccination (annually) All adults should have a flu vaccine yearly        Pneumococcal Vaccination (once after 72) All adults  over age 72 should receive the recommended pneumonia vaccines. Current USPSTF guidelines recommend a series of two vaccines for the best pneumonia protection.    ppsv23 5/04   Hepatitis B Vaccinations (if medium/high risk) Medium/high risk factors:  End-stage renal disease,  Hemophiliacs who received Factor VIII or IX concentrates, Clients of institutions for the mentally retarded, Persons who live in the same house as a HepB virus carrier, Homosexual men, Illicit injectable drug abusers. Shingles Vaccination A shingles vaccine is also recommended once in a lifetime after age 61     Ultrasound Screening for Abdominal Aortic Aneurysm (AAA) (once) An Abdominal Aortic Aneurysm (AAA) Screening is recommended for men age 73-68 who has ever smoked in their lifetime.   of the following criteria:  - Men who are 73-68 years old and have smoked more than 100 cigarettes in their lifetime.  -Anyone with a FH of AAA  -Anyone recommended for screening by USPSTF       Hep C All adults born between [facility] should be screened once for Hepatitis C     Tetanus  All adults should have a tetanus vaccine every 10 years  10/13

## 2019-07-25 NOTE — PROGRESS NOTES
Patient is in the office today for 6 month follow up. 1. Have you been to the ER, urgent care clinic since your last visit? Hospitalized since your last visit? No    2. Have you seen or consulted any other health care providers outside of the [address] [place] [address] since your last visit? Include any pap smears or colon screening. No      This is the Subsequent Medicare Annual Wellness Exam, performed 12 months or more after the Initial AWV or the last Subsequent AWV    I have reviewed the patient's medical history in detail and updated the computerized patient record. History     Past Medical History:   Diagnosis Date    Atrial fibrillation (HCC)     CHADS 0  (-CHF, -HTN, -AGE, -DM, -CVA)    Cardiac echocardiogram 06/29/2010    EF 70-75%. Mild conc LVh. Gr 1 DDfx. Mild DELMY.  Cardiac Holter monitor, normal 06/28/2010    Normal Holter study.  Cardiac nuclear imaging test 11/22/2010    Sm area of apical ischemia and inferolateral scar, both possibly apical thinning. No WMA. EF 60%.  Impotence of organic origin     Pacemaker     3/11  MEDTRONIC    Personal history of malignant neoplasm of prostate     T1a, Burden 6 (3+3)     Prostate cancer ([facility]     PUD (peptic ulcer disease)     Sick sinus syndrome ([facility]     status post implantation of Medtronic dual-chamber permanent pacemaker 3/22/11.  Unspecified essential hypertension     Venereal disease       Past Surgical History:   Procedure Laterality Date    HX CATARACT REMOVAL      HX MOHS PROCEDURES  1996    VT COLONOSCOPY FLX DX W/COLLJ SPEC WHEN PFRMD      VT CRYOSURG ABLATION OF PROSTATE  2/08    [facility], Dr. Homar Gaming     Current Outpatient Medications   Medication Sig Dispense Refill    fish oil-omega-3 fatty acids 340-1,000 mg capsule Take 1,000 mg by mouth.       dilTIAZem CD (CARDIZEM CD) 240 mg ER capsule TAKE 1 CAPSULE TWICE A  Cap 3    latanoprost (XALATAN) 0.005 % ophthalmic solution Administer 1 Drop to both eyes daily.  brimonidine (ALPHAGAN) 0.2 % ophthalmic solution Administer 1 Drop to both eyes two (2) times a day.  aspirin 81 mg tablet Take 2 Tabs by mouth daily. 1 Tab 0    ZINC MTH/COPPER/SAW PALM/GNSG (PROSTATE HEALTH FORMULA PO) Take 1 Tab by mouth daily.  omega-3 fatty acids-vitamin e (FISH OIL) 1,000 mg Cap Take 1 Cap by mouth daily. Allergies   Allergen Reactions    Shellfish Containing Products Swelling and Other (comments)     History reviewed. No pertinent family history. Social History     Tobacco Use    Smoking status: Former Smoker     Packs/day: 0.25     Years: 1.00     Pack years: 0.25     Last attempt to quit: 1981     Years since quittin.3    Smokeless tobacco: Never Used   Substance Use Topics    Alcohol use: No     Patient Active Problem List   Diagnosis Code    Atrial fibrillation (HCC) I48.91    Sick sinus syndrome (HCC) I49.5    Impotence of organic origin N52.9    Personal history of malignant neoplasm of prostate Z85.46    Elevated prostate specific antigen (PSA) R97.20    HTN (hypertension) I10    Malignant neoplasm of prostate ([facility] C61    Dyslipidemia E78.5    Essential hypertension I10    ACP (advance care planning) Z71.89    Cardiac pacemaker in situ Z95.0       Depression Risk Factor Screening:     3 most recent PHQ Screens 2019   Little interest or pleasure in doing things Not at all   Feeling down, depressed, irritable, or hopeless Not at all   Total Score PHQ 2 0     Alcohol Risk Factor Screening: You do not drink alcohol or very rarely. Functional Ability and Level of Safety:   Hearing Loss  Hearing is good. Activities of Daily Living  The home contains: [employer]  Patient does total self care    Fall Risk  Fall Risk Assessment, last 12 mths 2019   Able to walk? Yes   Fall in past 12 months?  No   Fall with injury? -   Number of falls in past 12 months -   Fall Risk Score -       Abuse Screen  Patient is not abused    Cognitive Screening   Evaluation of Cognitive Function:  Has your family/caregiver stated any concerns about your memory: no  Normal    Patient Care Team   Patient Care Team:  Alen Villalobos MD as PCP - General  Steven Lowe MD (Urology)  Zion Major MD (Cardiology)  Jonnathan Youssef MD as Hospitalist (Radiation Oncology)  Jose Jarquin MD (Ophthalmology)     Glaucoma Screening-  pt following for glaucoma   Pneumonia Vaccine- patient needs Pneumovax to be completed, declines today but will take with Flu shot in fall. Shingles Vaccine-  Declines currently.    Tdap Vaccine-  10/2013    Colonoscopy-  10/2014 Dr Edda Begum normal f/u in 10 yrs    PSA- followed by Dr Sushma Hughes for prostate cancer status post external beam radiation  Advance Directive-  on file    Assessment/Plan   Education and counseling provided:  Are appropriate based on today's review and evaluation        ICD-10-CM ICD-9-CM    1. Medicare annual wellness visit, subsequent Z00.00 V70.0    2. Essential hypertension I10 401.9 LIPID PANEL      METABOLIC PANEL, COMPREHENSIVE   3. Paroxysmal atrial fibrillation (HCC) I48.0 427.31            Health Maintenance Due   Topic Date Due    Shingrix Vaccine Age 49> (1 of 2) 11/30/1989    Pneumococcal 65+ years (2 of 2 - PPSV23) 07/26/2017    GLAUCOMA SCREENING Q2Y  09/08/2019     A comprehensive 5 year plan for medical care and screening exams was reviewed with pt and they received a copy of it.

## 2019-07-25 NOTE — PROGRESS NOTES
Miguel Benavides is a 77 y.o.  male and presents with Hypertension; Irregular Heart Beat (f/u); and Annual Wellness Visit      SUBJECTIVE:  Pt's HTN and Afib is well controlled on Cardizem. His cholesterol remains borderline controlled. He is currently a candidate for statin therapy but is currently declining using more medication. He will also try to exercise more to improve his cholesterol. He is also taking care of his wife who has dementia which makes it difficult for him to do exercise. Pt continues to be followed by cardiology for h/o PAF    Patient is followed by urology for history of prostate cancer. He had external beam radiation in the past.    Respiratory ROS: negative for - shortness of breath  Cardiovascular ROS: negative for - chest pain    Current Outpatient Medications   Medication Sig    fish oil-omega-3 fatty acids 340-1,000 mg capsule Take 1,000 mg by mouth.  dilTIAZem CD (CARDIZEM CD) 240 mg ER capsule TAKE 1 CAPSULE TWICE A DAY    latanoprost (XALATAN) 0.005 % ophthalmic solution Administer 1 Drop to both eyes daily.  brimonidine (ALPHAGAN) 0.2 % ophthalmic solution Administer 1 Drop to both eyes two (2) times a day.  aspirin 81 mg tablet Take 2 Tabs by mouth daily.  ZINC MTH/COPPER/SAW PALM/GNSG (PROSTATE HEALTH FORMULA PO) Take 1 Tab by mouth daily.  omega-3 fatty acids-vitamin e (FISH OIL) 1,000 mg Cap Take 1 Cap by mouth daily. No current facility-administered medications for this visit.           OBJECTIVE:  alert, well appearing, and in no distress  Visit Vitals  /68 (BP 1 Location: Left arm, BP Patient Position: Sitting)   Pulse 76   Temp 98 °F (36.7 °C) (Oral)   Resp 20   Ht 6' 2\" (1.88 m)   Wt 208 lb (94.3 kg)   SpO2 97%   BMI 26.71 kg/m²      well developed and well nourished  Neck - supple, no significant adenopathy  Chest - clear to auscultation, no wheezes, rales or rhonchi, symmetric air entry  Heart - normal rate, regular rhythm, normal S1, S2, no murmurs, rubs, clicks or gallops  Extremities - peripheral pulses normal, no pedal edema, no clubbing or cyanosis  Skin - normal coloration and turgor, no rashes, no suspicious skin lesions noted      Labs:   Lab Results   Component Value Date/Time    Cholesterol, total 164 07/18/2019 07:05 AM    HDL Cholesterol 51 07/18/2019 07:05 AM    LDL, calculated 102.2 (H) 07/18/2019 07:05 AM    Triglyceride 54 07/18/2019 07:05 AM    CHOL/HDL Ratio 3.2 07/18/2019 07:05 AM     Lab Results   Component Value Date/Time    AST (SGOT) 22 07/18/2019 07:05 AM    Alk. phosphatase 90 07/18/2019 07:05 AM     Lab Results   Component Value Date/Time    GFR est AA >60 07/18/2019 07:05 AM    GFR est non-AA 57 (L) 07/18/2019 07:05 AM    Creatinine 1.22 07/18/2019 07:05 AM    BUN 16 07/18/2019 07:05 AM    Sodium 141 07/18/2019 07:05 AM    Potassium 4.6 07/18/2019 07:05 AM    Chloride 107 07/18/2019 07:05 AM    CO2 28 07/18/2019 07:05 AM      Lab Results   Component Value Date/Time    Glucose 95 07/18/2019 07:05 AM        Assessment/Plan      ICD-10-CM ICD-9-CM    1. Medicare annual wellness visit, subsequent Z00.00 V70.0    2. Essential hypertension I10 401.9 Well controlled on Cardizem LIPID PANEL      METABOLIC PANEL, COMPREHENSIVE   3. Paroxysmal atrial fibrillation (HCC) I48.0 427.31 Pt stable on cardizem and ASA. He is followed by Cardiology. Follow-up and Dispositions    · Return in about 6 months (around 1/25/2020) for labs 1 week before. Reviewed plan of care. Patient has provided input and agrees with goals.

## 2019-07-31 RX ORDER — DILTIAZEM HYDROCHLORIDE 240 MG/1
CAPSULE, COATED, EXTENDED RELEASE ORAL
Qty: 180 CAP | Refills: 3 | Status: SHIPPED | OUTPATIENT
Start: 2019-07-31 | End: 2020-07-27

## 2019-08-15 ENCOUNTER — OFFICE VISIT (OUTPATIENT)
Dept: CARDIOLOGY CLINIC | Age: 79
End: 2019-08-15

## 2019-08-15 VITALS
BODY MASS INDEX: 26.56 KG/M2 | OXYGEN SATURATION: 98 % | SYSTOLIC BLOOD PRESSURE: 110 MMHG | HEIGHT: 74 IN | HEART RATE: 71 BPM | WEIGHT: 207 LBS | DIASTOLIC BLOOD PRESSURE: 68 MMHG

## 2019-08-15 DIAGNOSIS — Z95.0 CARDIAC PACEMAKER IN SITU: ICD-10-CM

## 2019-08-15 DIAGNOSIS — I49.5 SICK SINUS SYNDROME (HCC): ICD-10-CM

## 2019-08-15 DIAGNOSIS — I10 ESSENTIAL HYPERTENSION: ICD-10-CM

## 2019-08-15 DIAGNOSIS — I48.0 PAROXYSMAL ATRIAL FIBRILLATION (HCC): Primary | ICD-10-CM

## 2019-08-15 NOTE — PROGRESS NOTES
Jarek Pascual presents today for   Chief Complaint   Patient presents with    Irregular Heart Beat     6 month follow up - no cardiac complaints        Jarek Pascual preferred language for health care discussion is english/other. Is someone accompanying this pt? Wife     Is the patient using any DME equipment during [address]? no    Depression Screening:  3 most recent PHQ Screens 8/15/2019   Little interest or pleasure in doing things Not at all   Feeling down, depressed, irritable, or hopeless Not at all   Total Score PHQ 2 0       Learning Assessment:  Learning Assessment 8/15/2019   PRIMARY LEARNER Patient   HIGHEST LEVEL OF EDUCATION - PRIMARY LEARNER  -   BARRIERS PRIMARY LEARNER -   CO-LEARNER CAREGIVER -   PRIMARY LANGUAGE ENGLISH   LEARNER PREFERENCE PRIMARY DEMONSTRATION     -   ANSWERED BY Patient   RELATIONSHIP SELF       Abuse Screening:  Abuse Screening Questionnaire 7/25/2019   Do you ever feel afraid of your partner? N   Are you in a relationship with someone who physically or mentally threatens you? N   Is it safe for you to go home? Y       Fall Risk  Fall Risk Assessment, last 12 mths 8/15/2019   Able to walk? Yes   Fall in past 12 months? No   Fall with injury? -   Number of falls in past 12 months -   Fall Risk Score -       Pt currently taking Anticoagulant therapy? ASA 81mg every day     Coordination of Care:  1. Have you been to the ER, urgent care clinic since your last visit? Hospitalized since your last visit? no    2. Have you seen or consulted any other health care providers outside of the [address] [place] [address] since your last visit? Include any pap smears or colon screening.  no

## 2019-08-15 NOTE — PROGRESS NOTES
HISTORY OF PRESENT ILLNESS  Humza Lozano is a 77 y.o. male. Palpitations    Pertinent negatives include no fever, no chest pain, no claudication, no orthopnea, no PND, no abdominal pain, no nausea, no vomiting, no headaches, no dizziness, no cough and no shortness of breath. Patient presents for a scheduled followup visit. He has a past medical history significant for paroxysmal atrial fibrillation, hypertension, and sick sinus syndrome, status post dual-chamber permanent pacemaker in March 2011. He underwent an exercise nuclear stress test in February 2019 which was a normal low risk study. He exercised 8 minutes on the treadmill using the Raghu protocol without any EKG changes or symptoms. No ischemia. EF 72%. He was last seen in our office 6 to 7 months ago. Since last visit he has had no recurrent chest pain. He does mention an occasional heart palpitation or 2 with only last for seconds at most in duration. No change in his activity level. Past Medical History:   Diagnosis Date    Atrial fibrillation (HCC)     CHADS 0  (-CHF, -HTN, -AGE, -DM, -CVA)    Cardiac echocardiogram 06/29/2010    EF 70-75%. Mild conc LVh. Gr 1 DDfx. Mild DELMY.  Cardiac Holter monitor, normal 06/28/2010    Normal Holter study.  Cardiac nuclear imaging test 11/22/2010    Sm area of apical ischemia and inferolateral scar, both possibly apical thinning. No WMA. EF 60%.  Impotence of organic origin     Pacemaker     3/11  MEDTRONIC    Personal history of malignant neoplasm of prostate     T1a, [place] 6 (3+3)     Prostate cancer ([facility] [address]     PUD (peptic ulcer disease)     Sick sinus syndrome ([facility] [address]     status post implantation of Medtronic dual-chamber permanent pacemaker 3/22/11.     Unspecified essential hypertension     Venereal disease       Current Outpatient Medications   Medication Sig Dispense Refill    dilTIAZem CD (CARDIZEM CD) 240 mg ER capsule TAKE 1 CAPSULE TWICE A  Cap 3    fish oil-omega-3 fatty acids 340-1,000 mg capsule Take 1,000 mg by mouth.  latanoprost (XALATAN) 0.005 % ophthalmic solution Administer 1 Drop to both eyes daily.  brimonidine (ALPHAGAN) 0.2 % ophthalmic solution Administer 1 Drop to both eyes two (2) times a day.  aspirin 81 mg tablet Take 2 Tabs by mouth daily. 1 Tab 0    ZINC MTH/COPPER/SAW PALM/GNSG (PROSTATE HEALTH FORMULA PO) Take 1 Tab by mouth daily.  omega-3 fatty acids-vitamin e (FISH OIL) 1,000 mg Cap Take 1 Cap by mouth daily. Allergies   Allergen Reactions    Shellfish Containing Products Swelling and Other (comments)      Social History     Tobacco Use    Smoking status: Former Smoker     Packs/day: 0.25     Years: 1.00     Pack years: 0.25     Last attempt to quit: 1981     Years since quittin.3    Smokeless tobacco: Never Used   Substance Use Topics    Alcohol use: No    Drug use: No         Review of Systems   Constitutional: Negative for chills, fever and weight loss. HENT: Negative for nosebleeds. Eyes: Negative for blurred vision and double vision. Respiratory: Negative for cough, shortness of breath and wheezing. Cardiovascular: Negative for chest pain, palpitations, orthopnea, claudication, leg swelling and PND. Gastrointestinal: Negative for abdominal pain, heartburn, nausea and vomiting. Genitourinary: Negative for dysuria and hematuria. Musculoskeletal: Negative for falls and myalgias. Skin: Negative for rash. Neurological: Negative for dizziness, focal weakness and headaches. Endo/Heme/Allergies: Does not bruise/bleed easily. Psychiatric/Behavioral: Negative for substance abuse. Visit Vitals  /68 (BP 1 Location: Left arm, BP Patient Position: Sitting)   Pulse 71   Ht 6' 2\" (1.88 m)   Wt 93.9 kg (207 lb)   SpO2 98%   BMI 26.58 kg/m²      Physical Exam   Constitutional: He is oriented to person, place, and time. He appears well-developed and well-nourished.    HENT: Head: Normocephalic and atraumatic. Eyes: Conjunctivae are normal.   Neck: Neck supple. No JVD present. Carotid bruit is not present. Cardiovascular: Normal rate, regular rhythm, S1 normal, S2 normal and normal pulses. Exam reveals no gallop and no S3. No murmur heard. Pulmonary/Chest: Breath sounds normal. He has no wheezes. He has no rales. Abdominal: Soft. Bowel sounds are normal. There is no tenderness. Musculoskeletal: He exhibits no edema. Neurological: He is alert and oriented to person, place, and time. Skin: Skin is warm and dry. EKG: Atrial paced rhythm, intrinsic QRS conduction with an incomplete right bundle branch block, no ST or T wave abnormalities. No change compared to the previous EKG. Pacemaker interrogation. Battery life estimated at 2.3  years. Episode of intermittent atrial fibrillation in June 2019 total episode lasted for about 7 hours. No high rate episodes. Pacing in the atrium 98%, and in the ventricle 0%. Scanned document for details. ASSESSMENT and PLAN    Paroxysmal atrial fibrillation. Patient had another episode of atrial fibrillation in June 2019. Prior to that his previous episodes were 2 days of intermittent atrial fibrillation in November 2018 longest episode lasted for nearly 8 hours. I would continue his regimen of Cardizem and aspirin for now. If he does have more frequent episodes, I would like to switch his aspirin over to Eliquis for oral anticoagulation. Sick sinus syndrome. Status post dual-chamber permanent pacemaker. Normal device function on interrogation. The patient continues to pace the majority of the time in the atrium and very little in the ventricle. Battery life estimated at 2.3 years. Essential hypertension. Patient blood pressure is well controlled on diltiazem as monotherapy. Dyslipidemia. This is only been mildly elevated in the past.  This is followed closely by his PCP.   The patient works on lifestyle modification. Chest pain. This has since resolved. Patient underwent a lower stress test in February 2019.     In office device check in 3 months to reassess for atrial fibrillation  Follow-up in 6 months, sooner if needed

## 2019-10-28 ENCOUNTER — CLINICAL SUPPORT (OUTPATIENT)
Dept: FAMILY MEDICINE CLINIC | Age: 79
End: 2019-10-28

## 2019-10-28 VITALS
HEART RATE: 72 BPM | TEMPERATURE: 97.9 F | SYSTOLIC BLOOD PRESSURE: 124 MMHG | RESPIRATION RATE: 16 BRPM | DIASTOLIC BLOOD PRESSURE: 67 MMHG | OXYGEN SATURATION: 95 %

## 2019-10-28 DIAGNOSIS — Z23 ENCOUNTER FOR IMMUNIZATION: ICD-10-CM

## 2019-10-28 NOTE — PROGRESS NOTES
Patient presented to office for flu shot. Allergies noted. Patient well and consenting to injection. Vaccine given intramuscular in RIGHT deltoid. Patient tolerated injection well and left office ambulatory.

## 2019-11-21 ENCOUNTER — CLINICAL SUPPORT (OUTPATIENT)
Dept: CARDIOLOGY CLINIC | Age: 79
End: 2019-11-21

## 2019-11-21 DIAGNOSIS — I49.5 SICK SINUS SYNDROME (HCC): Primary | ICD-10-CM

## 2019-11-21 DIAGNOSIS — Z95.0 CARDIAC PACEMAKER IN SITU: ICD-10-CM

## 2019-11-22 NOTE — PROGRESS NOTES
I have personally seen and evaluated the device findings. Interrogation reviewed and I agree with assessment.     Shelly Ding

## 2020-01-16 ENCOUNTER — HOSPITAL ENCOUNTER (OUTPATIENT)
Dept: LAB | Age: 80
Discharge: HOME | End: 2020-01-16
Payer: MEDICARE

## 2020-01-16 DIAGNOSIS — I10 ESSENTIAL HYPERTENSION: ICD-10-CM

## 2020-01-16 LAB
ALBUMIN SERPL-MCNC: 3.9 G/DL (ref 3.4–5)
ALBUMIN/GLOB SERPL: 1.3 {RATIO} (ref 0.8–1.7)
ALP SERPL-CCNC: 89 U/L (ref 45–117)
ALT SERPL-CCNC: 34 U/L (ref 16–61)
ANION GAP SERPL CALC-SCNC: 3 MMOL/L (ref 3–18)
AST SERPL-CCNC: 20 U/L (ref 10–38)
BILIRUB SERPL-MCNC: 0.9 MG/DL (ref 0.2–1)
BUN SERPL-MCNC: 17 MG/DL (ref 7–18)
BUN/CREAT SERPL: 14 (ref 12–20)
CALCIUM SERPL-MCNC: 9 MG/DL (ref 8.5–10.1)
CHLORIDE SERPL-SCNC: 108 MMOL/L (ref 100–111)
CHOLEST SERPL-MCNC: 175 MG/DL
CO2 SERPL-SCNC: 29 MMOL/L (ref 21–32)
CREAT SERPL-MCNC: 1.2 MG/DL (ref 0.6–1.3)
GLOBULIN SER CALC-MCNC: 3.1 G/DL (ref 2–4)
GLUCOSE SERPL-MCNC: 104 MG/DL (ref 74–99)
HDLC SERPL-MCNC: 55 MG/DL (ref 40–60)
HDLC SERPL: 3.2 {RATIO} (ref 0–5)
LDLC SERPL CALC-MCNC: 110.2 MG/DL (ref 0–100)
LIPID PROFILE,FLP: ABNORMAL
POTASSIUM SERPL-SCNC: 4.5 MMOL/L (ref 3.5–5.5)
PROT SERPL-MCNC: 7 G/DL (ref 6.4–8.2)
SODIUM SERPL-SCNC: 140 MMOL/L (ref 136–145)
TRIGL SERPL-MCNC: 49 MG/DL (ref ?–150)
VLDLC SERPL CALC-MCNC: 9.8 MG/DL

## 2020-01-16 PROCEDURE — 80061 LIPID PANEL: CPT

## 2020-01-16 PROCEDURE — 80053 COMPREHEN METABOLIC PANEL: CPT

## 2020-01-16 PROCEDURE — 36415 COLL VENOUS BLD VENIPUNCTURE: CPT

## 2020-01-23 ENCOUNTER — OFFICE VISIT (OUTPATIENT)
Dept: FAMILY MEDICINE CLINIC | Age: 80
End: 2020-01-23

## 2020-01-23 VITALS
BODY MASS INDEX: 26.95 KG/M2 | SYSTOLIC BLOOD PRESSURE: 120 MMHG | TEMPERATURE: 97.8 F | DIASTOLIC BLOOD PRESSURE: 66 MMHG | OXYGEN SATURATION: 96 % | HEART RATE: 72 BPM | HEIGHT: 74 IN | WEIGHT: 210 LBS | RESPIRATION RATE: 20 BRPM

## 2020-01-23 DIAGNOSIS — I48.0 PAROXYSMAL ATRIAL FIBRILLATION (HCC): ICD-10-CM

## 2020-01-23 DIAGNOSIS — H61.23 BILATERAL IMPACTED CERUMEN: ICD-10-CM

## 2020-01-23 DIAGNOSIS — I10 ESSENTIAL HYPERTENSION: Primary | ICD-10-CM

## 2020-01-23 NOTE — PROGRESS NOTES
Wesly Mckinley is a [de-identified] y.o.  male and presents with Hypertension; Wax in Ear; and Irregular Heart Beat      SUBJECTIVE:  Pt's HTN and Afib is well controlled on Cardizem. His cholesterol remains borderline controlled. He is currently a candidate for statin therapy but is currently declining using more medication. He will also try to exercise more to improve his cholesterol. He is also taking care of his wife who has dementia which makes it difficult for him to do exercise. Pt continues to be followed by cardiology for h/o PAF    Patient is followed by urology for history of prostate cancer. He had external beam radiation in the past.    He is c/o decreased hearing in left ear and has h/o impacted cerumen     Respiratory ROS: negative for - shortness of breath  Cardiovascular ROS: negative for - chest pain    Current Outpatient Medications   Medication Sig    dilTIAZem CD (CARDIZEM CD) 240 mg ER capsule TAKE 1 CAPSULE TWICE A DAY    fish oil-omega-3 fatty acids 340-1,000 mg capsule Take 1,000 mg by mouth.  latanoprost (XALATAN) 0.005 % ophthalmic solution Administer 1 Drop to both eyes daily.  brimonidine (ALPHAGAN) 0.2 % ophthalmic solution Administer 1 Drop to both eyes two (2) times a day.  aspirin 81 mg tablet Take 2 Tabs by mouth daily.  ZINC MTH/COPPER/SAW PALM/GNSG (PROSTATE HEALTH FORMULA PO) Take 1 Tab by mouth daily.  omega-3 fatty acids-vitamin e (FISH OIL) 1,000 mg Cap Take 1 Cap by mouth daily. No current facility-administered medications for this visit.           OBJECTIVE:  alert, well appearing, and in no distress  Visit Vitals  /66 (BP 1 Location: Left arm, BP Patient Position: Sitting)   Pulse 72   Temp 97.8 °F (36.6 °C) (Oral)   Resp 20   Ht 6' 2\" (1.88 m)   Wt 210 lb (95.3 kg)   SpO2 96%   BMI 26.96 kg/m²      well developed and well nourished  HEENT- impacted cerumen in both ears  Neck - supple, no significant adenopathy  Chest - clear to auscultation, no wheezes, rales or rhonchi, symmetric air entry  Heart - normal rate, regular rhythm, normal S1, S2, no murmurs, rubs, clicks or gallops  Extremities - peripheral pulses normal, no pedal edema, no clubbing or cyanosis  Skin - normal coloration and turgor, no rashes, no suspicious skin lesions noted      Labs:   Lab Results   Component Value Date/Time    Cholesterol, total 175 01/16/2020 07:14 AM    HDL Cholesterol 55 01/16/2020 07:14 AM    LDL, calculated 110.2 (H) 01/16/2020 07:14 AM    Triglyceride 49 01/16/2020 07:14 AM    CHOL/HDL Ratio 3.2 01/16/2020 07:14 AM     Lab Results   Component Value Date/Time    AST (SGOT) 20 01/16/2020 07:14 AM    Alk. phosphatase 89 01/16/2020 07:14 AM     Lab Results   Component Value Date/Time    GFR est AA >60 01/16/2020 07:14 AM    GFR est non-AA 58 (L) 01/16/2020 07:14 AM    Creatinine 1.20 01/16/2020 07:14 AM    BUN 17 01/16/2020 07:14 AM    Sodium 140 01/16/2020 07:14 AM    Potassium 4.5 01/16/2020 07:14 AM    Chloride 108 01/16/2020 07:14 AM    CO2 29 01/16/2020 07:14 AM      Lab Results   Component Value Date/Time    Glucose 104 (H) 01/16/2020 07:14 AM        Assessment/Plan      ICD-10-CM ICD-9-CM    1. Essential hypertension I10 401.9 Well controlled on Cardizem LIPID PANEL      METABOLIC PANEL, COMPREHENSIVE   2. Paroxysmal atrial fibrillation (HCC) I48.0 427.31 Well controlled on Cardizem. Pt followed by Cardiology    3. Bilateral impacted cerumen H61.23 380.4 REFERRAL TO ENT-OTOLARYNGOLOGY                Follow-up and Dispositions    · Return in about 6 months (around 7/23/2020) for OV, and Medicare Wellness Visit, labs 1 week before. Reviewed plan of care. Patient has provided input and agrees with goals.

## 2020-01-23 NOTE — PATIENT INSTRUCTIONS
High Blood Pressure: Care Instructions  Overview    It's normal for blood pressure to go up and down throughout the day. But if it stays up, you have high blood pressure. Another name for high blood pressure is hypertension. Despite what a lot of people think, high blood pressure usually doesn't cause headaches or make you feel dizzy or lightheaded. It usually has no symptoms. But it does increase your risk of stroke, heart attack, and other problems. You and your doctor will talk about your risks of these problems based on your blood pressure. Your doctor will give you a goal for your blood pressure. Your goal will be based on your health and your age. Lifestyle changes, such as eating healthy and being active, are always important to help lower blood pressure. You might also take medicine to reach your blood pressure goal.  Follow-up care is a key part of your treatment and safety. Be sure to make and go to all appointments, and call your doctor if you are having problems. It's also a good idea to know your test results and keep a list of the medicines you take. How can you care for yourself at home? Medical treatment  · If you stop taking your medicine, your blood pressure will go back up. You may take one or more types of medicine to lower your blood pressure. Be safe with medicines. Take your medicine exactly as prescribed. Call your doctor if you think you are having a problem with your medicine. · Talk to your doctor before you start taking aspirin every day. Aspirin can help certain people lower their risk of a heart attack or stroke. But taking aspirin isn't right for everyone, because it can cause serious bleeding. · See your doctor regularly. You may need to see the doctor more often at first or until your blood pressure comes down. · If you are taking blood pressure medicine, talk to your doctor before you take decongestants or anti-inflammatory medicine, such as ibuprofen.  Some of these medicines can raise blood pressure. · Learn how to check your blood pressure at home. Lifestyle changes  · Stay at a healthy weight. This is especially important if you put on weight around the waist. Losing even 10 pounds can help you lower your blood pressure. · If your doctor recommends it, get more exercise. Walking is a good choice. Bit by bit, increase the amount you walk every day. Try for at least 30 minutes on most days of the week. You also may want to swim, bike, or do other activities. · Avoid or limit alcohol. Talk to your doctor about whether you can drink any alcohol. · Try to limit how much sodium you eat to less than 2,300 milligrams (mg) a day. Your doctor may ask you to try to eat less than 1,500 mg a day. · Eat plenty of fruits (such as bananas and oranges), vegetables, legumes, whole grains, and low-fat dairy products. · Lower the amount of saturated fat in your diet. Saturated fat is found in animal products such as milk, cheese, and meat. Limiting these foods may help you lose weight and also lower your risk for heart disease. · Do not smoke. Smoking increases your risk for heart attack and stroke. If you need help quitting, talk to your doctor about stop-smoking programs and medicines. These can increase your chances of quitting for good. When should you call for help? Call  911 anytime you think you may need emergency care. This may mean having symptoms that suggest that your blood pressure is causing a serious heart or blood vessel problem. Your blood pressure may be over 180/120.   For example, call  911 if:    · You have symptoms of a heart attack. These may include:  ? Chest pain or pressure, or a strange feeling in the chest.  ? Sweating. ? Shortness of breath. ? Nausea or vomiting. ? Pain, pressure, or a strange feeling in the back, neck, jaw, or upper belly or in one or both shoulders or arms. ? Lightheadedness or sudden weakness.   ? A fast or irregular heartbeat.     · You have symptoms of a stroke. These may include:  ? Sudden numbness, tingling, weakness, or loss of movement in your face, arm, or leg, especially on only one side of your body. ? Sudden vision changes. ? Sudden trouble speaking. ? Sudden confusion or trouble understanding simple statements. ? Sudden problems with walking or balance. ? A sudden, severe headache that is different from past headaches.     · You have severe back or belly pain.    Do not wait until your blood pressure comes down on its own. Get help right away.   Call your doctor now or seek immediate care if:    · Your blood pressure is much higher than normal (such as 180/120 or higher), but you don't have symptoms.     · You think high blood pressure is causing symptoms, such as:  ? Severe headache.  ? Blurry vision.    Watch closely for changes in your health, and be sure to contact your doctor if:    · Your blood pressure measures higher than your doctor recommends at least 2 times. That means the top number is higher or the bottom number is higher, or both.     · You think you may be having side effects from your blood pressure medicine. Where can you learn more? Go to http://lachelle-gi.info/. Enter T380 in the search box to learn more about \"High Blood Pressure: Care Instructions. \"  Current as of: April 9, 2019  Content Version: 12.2  © 0888-2474 [employer]. Care instructions adapted under license by [employer] (which disclaims liability or warranty for this information). If you have questions about a medical condition or this instruction, always ask your healthcare professional. [address] any warranty or liability for your use of this information.

## 2020-01-23 NOTE — PROGRESS NOTES
Patient is in the office today for 4 month follow up. 1. Have you been to the ER, urgent care clinic since your last visit? Hospitalized since your last visit? No    2. Have you seen or consulted any other health care providers outside of the [address] [place] [address] since your last visit? Include any pap smears or colon screening.  No

## 2020-02-20 ENCOUNTER — CLINICAL SUPPORT (OUTPATIENT)
Dept: CARDIOLOGY CLINIC | Age: 80
End: 2020-02-20

## 2020-02-20 DIAGNOSIS — I49.5 SICK SINUS SYNDROME (HCC): Primary | ICD-10-CM

## 2020-02-20 DIAGNOSIS — Z95.0 CARDIAC PACEMAKER IN SITU: ICD-10-CM

## 2020-07-23 ENCOUNTER — APPOINTMENT (OUTPATIENT)
Dept: INTERNAL MEDICINE CLINIC | Age: 80
End: 2020-07-23

## 2020-07-23 ENCOUNTER — HOSPITAL ENCOUNTER (OUTPATIENT)
Dept: LAB | Age: 80
Discharge: HOME | End: 2020-07-23
Payer: MEDICARE

## 2020-07-23 DIAGNOSIS — I10 ESSENTIAL HYPERTENSION: ICD-10-CM

## 2020-07-23 LAB
ALBUMIN SERPL-MCNC: 3.9 G/DL (ref 3.4–5)
ALBUMIN/GLOB SERPL: 1.2 {RATIO} (ref 0.8–1.7)
ALP SERPL-CCNC: 89 U/L (ref 45–117)
ALT SERPL-CCNC: 27 U/L (ref 16–61)
ANION GAP SERPL CALC-SCNC: 5 MMOL/L (ref 3–18)
AST SERPL-CCNC: 19 U/L (ref 10–38)
BILIRUB SERPL-MCNC: 0.6 MG/DL (ref 0.2–1)
BUN SERPL-MCNC: 15 MG/DL (ref 7–18)
BUN/CREAT SERPL: 13 (ref 12–20)
CALCIUM SERPL-MCNC: 9 MG/DL (ref 8.5–10.1)
CHLORIDE SERPL-SCNC: 108 MMOL/L (ref 100–111)
CHOLEST SERPL-MCNC: 167 MG/DL
CO2 SERPL-SCNC: 28 MMOL/L (ref 21–32)
CREAT SERPL-MCNC: 1.2 MG/DL (ref 0.6–1.3)
GLOBULIN SER CALC-MCNC: 3.2 G/DL (ref 2–4)
GLUCOSE SERPL-MCNC: 94 MG/DL (ref 74–99)
HDLC SERPL-MCNC: 51 MG/DL (ref 40–60)
HDLC SERPL: 3.3 {RATIO} (ref 0–5)
LDLC SERPL CALC-MCNC: 106 MG/DL (ref 0–100)
LIPID PROFILE,FLP: ABNORMAL
POTASSIUM SERPL-SCNC: 5.1 MMOL/L (ref 3.5–5.5)
PROT SERPL-MCNC: 7.1 G/DL (ref 6.4–8.2)
SODIUM SERPL-SCNC: 141 MMOL/L (ref 136–145)
TRIGL SERPL-MCNC: 50 MG/DL (ref ?–150)
VLDLC SERPL CALC-MCNC: 10 MG/DL

## 2020-07-23 PROCEDURE — 80053 COMPREHEN METABOLIC PANEL: CPT

## 2020-07-23 PROCEDURE — 80061 LIPID PANEL: CPT

## 2020-07-23 PROCEDURE — 36415 COLL VENOUS BLD VENIPUNCTURE: CPT

## 2020-07-27 RX ORDER — DILTIAZEM HYDROCHLORIDE 240 MG/1
CAPSULE, COATED, EXTENDED RELEASE ORAL
Qty: 180 CAP | Refills: 3 | Status: SHIPPED | OUTPATIENT
Start: 2020-07-27 | End: 2021-04-13

## 2020-07-30 ENCOUNTER — OFFICE VISIT (OUTPATIENT)
Dept: INTERNAL MEDICINE CLINIC | Age: 80
End: 2020-07-30

## 2020-07-30 VITALS
HEIGHT: 74 IN | BODY MASS INDEX: 27.98 KG/M2 | WEIGHT: 218 LBS | RESPIRATION RATE: 18 BRPM | TEMPERATURE: 97.8 F | OXYGEN SATURATION: 97 % | DIASTOLIC BLOOD PRESSURE: 70 MMHG | SYSTOLIC BLOOD PRESSURE: 142 MMHG | HEART RATE: 69 BPM

## 2020-07-30 DIAGNOSIS — G89.29 CHRONIC MIDLINE LOW BACK PAIN WITHOUT SCIATICA: ICD-10-CM

## 2020-07-30 DIAGNOSIS — Z00.00 MEDICARE ANNUAL WELLNESS VISIT, SUBSEQUENT: Primary | ICD-10-CM

## 2020-07-30 DIAGNOSIS — I10 ESSENTIAL HYPERTENSION: ICD-10-CM

## 2020-07-30 DIAGNOSIS — M54.50 CHRONIC MIDLINE LOW BACK PAIN WITHOUT SCIATICA: ICD-10-CM

## 2020-07-30 DIAGNOSIS — I48.0 PAROXYSMAL ATRIAL FIBRILLATION (HCC): ICD-10-CM

## 2020-07-30 NOTE — PATIENT INSTRUCTIONS
High Blood Pressure: Care Instructions  Overview     It's normal for blood pressure to go up and down throughout the day. But if it stays up, you have high blood pressure. Another name for high blood pressure is hypertension. Despite what a lot of people think, high blood pressure usually doesn't cause headaches or make you feel dizzy or lightheaded. It usually has no symptoms. But it does increase your risk of stroke, heart attack, and other problems. You and your doctor will talk about your risks of these problems based on your blood pressure. Your doctor will give you a goal for your blood pressure. Your goal will be based on your health and your age. Lifestyle changes, such as eating healthy and being active, are always important to help lower blood pressure. You might also take medicine to reach your blood pressure goal.  Follow-up care is a key part of your treatment and safety. Be sure to make and go to all appointments, and call your doctor if you are having problems. It's also a good idea to know your test results and keep a list of the medicines you take. How can you care for yourself at home? Medical treatment  · If you stop taking your medicine, your blood pressure will go back up. You may take one or more types of medicine to lower your blood pressure. Be safe with medicines. Take your medicine exactly as prescribed. Call your doctor if you think you are having a problem with your medicine. · Talk to your doctor before you start taking aspirin every day. Aspirin can help certain people lower their risk of a heart attack or stroke. But taking aspirin isn't right for everyone, because it can cause serious bleeding. · See your doctor regularly. You may need to see the doctor more often at first or until your blood pressure comes down. · If you are taking blood pressure medicine, talk to your doctor before you take decongestants or anti-inflammatory medicine, such as ibuprofen.  Some of these medicines can raise blood pressure. · Learn how to check your blood pressure at home. Lifestyle changes  · Stay at a healthy weight. This is especially important if you put on weight around the waist. Losing even 10 pounds can help you lower your blood pressure. · If your doctor recommends it, get more exercise. Walking is a good choice. Bit by bit, increase the amount you walk every day. Try for at least 30 minutes on most days of the week. You also may want to swim, bike, or do other activities. · Avoid or limit alcohol. Talk to your doctor about whether you can drink any alcohol. · Try to limit how much sodium you eat to less than 2,300 milligrams (mg) a day. Your doctor may ask you to try to eat less than 1,500 mg a day. · Eat plenty of fruits (such as bananas and oranges), vegetables, legumes, whole grains, and low-fat dairy products. · Lower the amount of saturated fat in your diet. Saturated fat is found in animal products such as milk, cheese, and meat. Limiting these foods may help you lose weight and also lower your risk for heart disease. · Do not smoke. Smoking increases your risk for heart attack and stroke. If you need help quitting, talk to your doctor about stop-smoking programs and medicines. These can increase your chances of quitting for good. When should you call for help? Call  911 anytime you think you may need emergency care. This may mean having symptoms that suggest that your blood pressure is causing a serious heart or blood vessel problem. Your blood pressure may be over 180/120. For example, call 911 if:  · You have symptoms of a heart attack. These may include:  ? Chest pain or pressure, or a strange feeling in the chest.  ? Sweating. ? Shortness of breath. ? Nausea or vomiting. ? Pain, pressure, or a strange feeling in the back, neck, jaw, or upper belly or in one or both shoulders or arms. ? Lightheadedness or sudden weakness. ? A fast or irregular heartbeat.   · You have symptoms of a stroke. These may include:  ? Sudden numbness, tingling, weakness, or loss of movement in your face, arm, or leg, especially on only one side of your body. ? Sudden vision changes. ? Sudden trouble speaking. ? Sudden confusion or trouble understanding simple statements. ? Sudden problems with walking or balance. ? A sudden, severe headache that is different from past headaches. · You have severe back or belly pain. Do not wait until your blood pressure comes down on its own. Get help right away. Call your doctor now or seek immediate care if:  · Your blood pressure is much higher than normal (such as 180/120 or higher), but you don't have symptoms. · You think high blood pressure is causing symptoms, such as:  ? Severe headache.  ? Blurry vision. Watch closely for changes in your health, and be sure to contact your doctor if:  · Your blood pressure measures higher than your doctor recommends at least 2 times. That means the top number is higher or the bottom number is higher, or both. · You think you may be having side effects from your blood pressure medicine. Where can you learn more? Go to http://lachelle-gi.info/  Enter [id number] in the search box to learn more about \"High Blood Pressure: Care Instructions. \"  Current as of: December 16, 2019               Content Version: 12.5  © 4302-0173 [employer], [employer]. Care instructions adapted under license by [employer] (which disclaims liability or warranty for this information). If you have questions about a medical condition or this instruction, always ask your healthcare professional. [place] [address] any warranty or liability for your use of this information. Medicare Wellness Visit, Male    The best way to live healthy is to have a lifestyle where you eat a well-balanced diet, exercise regularly, limit alcohol use, and quit all forms of tobacco/nicotine, if applicable. Regular preventive services are another way to keep healthy. Preventive services (vaccines, screening tests, monitoring & exams) can help personalize your care plan, which helps you manage your own care. Screening tests can find health problems at the earliest stages, when they are easiest to treat. [facility] follows the current, evidence-based guidelines published by the [facility] Knowles ([facility]) when recommending preventive services for our patients. Because we follow these guidelines, sometimes recommendations change over time as research supports it. (For example, a prostate screening blood test is no longer routinely recommended for men with no symptoms). Of course, you and your doctor may decide to screen more often for some diseases, based on your risk and co-morbidities (chronic disease you are already diagnosed with). Preventive services for you include:  - Medicare offers their members a free annual wellness visit, which is time for you and your primary care provider to discuss and plan for your preventive service needs. Take advantage of this benefit every year!  -All adults over age 72 should receive the recommended pneumonia vaccines. Current USPSTF guidelines recommend a series of two vaccines for the best pneumonia protection.   -All adults should have a flu vaccine yearly and tetanus vaccine every 10 years.  -All adults age 48 and older should receive the shingles vaccines (series of two vaccines).        -All adults age 38-68 who are overweight should have a diabetes screening test once every three years.   -Other screening tests & preventive services for persons with diabetes include: an eye exam to screen for diabetic retinopathy, a kidney function test, a foot exam, and stricter control over your cholesterol.   -Cardiovascular screening for adults with routine risk involves an electrocardiogram (ECG) at intervals determined by the provider.   -Colorectal cancer screening should be done for adults age 54-65 with no increased risk factors for colorectal cancer. There are a number of acceptable methods of screening for this type of cancer. Each test has its own benefits and drawbacks. Discuss with your provider what is most appropriate for you during your annual wellness visit. The different tests include: colonoscopy (considered the best screening method), a fecal occult blood test, a fecal DNA test, and sigmoidoscopy.  -All adults born between [facility] should be screened once for Hepatitis C.  -An Abdominal Aortic Aneurysm (AAA) Screening is recommended for men age 73-68 who has ever smoked in their lifetime.      Here is a list of your current Health Maintenance items (your personalized list of preventive services) with a due date:  Health Maintenance Due   Topic Date Due    Shingles Vaccine (1 of 2) 11/30/1989    Pneumococcal Vaccine (2 of 2 - PPSV23) 07/26/2017    Glaucoma Screening   09/08/2019    Annual Well Visit  07/25/2020

## 2020-07-30 NOTE — PROGRESS NOTES
Patient is in the office today for a 6 month follow up, and Medicare Wellness Visit. 1. Have you been to the ER, urgent care clinic since your last visit? Hospitalized since your last visit? No    2. Have you seen or consulted any other health care providers outside of the [facility] since your last visit? Include any pap smears or colon screening.   No

## 2020-07-30 NOTE — PROGRESS NOTES
This is the Subsequent Medicare Annual Wellness Exam, performed 12 months or more after the Initial AWV or the last Subsequent AWV    I have reviewed the patient's medical history in detail and updated the computerized patient record. History     Patient Active Problem List   Diagnosis Code    Atrial fibrillation (HCC) I48.91    Sick sinus syndrome (HCC) I49.5    Impotence of organic origin N52.9    Personal history of malignant neoplasm of prostate Z85.46    Elevated prostate specific antigen (PSA) R97.20    HTN (hypertension) I10    Malignant neoplasm of prostate ([facility] C61    Dyslipidemia E78.5    Essential hypertension I10    ACP (advance care planning) Z71.89    Cardiac pacemaker in situ Z95.0     Past Medical History:   Diagnosis Date    Atrial fibrillation (HCC)     CHADS 0  (-CHF, -HTN, -AGE, -DM, -CVA)    Cardiac echocardiogram 06/29/2010    EF 70-75%. Mild conc LVh. Gr 1 DDfx. Mild DELMY.  Cardiac Holter monitor, normal 06/28/2010    Normal Holter study.  Cardiac nuclear imaging test 11/22/2010    Sm area of apical ischemia and inferolateral scar, both possibly apical thinning. No WMA. EF 60%.  Impotence of organic origin     Pacemaker     3/11  [employer]TRONIC    Personal history of malignant neoplasm of prostate     T1a, [place] 6 (3+3)     Prostate cancer ([facility]     PUD (peptic ulcer disease)     Sick sinus syndrome ([facility]     status post implantation of Medtronic dual-chamber permanent pacemaker 3/22/11.     Unspecified essential hypertension     Venereal disease       Past Surgical History:   Procedure Laterality Date    HX CATARACT REMOVAL      HX MOHS PROCEDURES  1996    [place] COLONOSCOPY FLX DX W/COLLJ SPEC WHEN PFRMD      [place] CRYOSURG ABLATION OF PROSTATE  2/08    [facility], Dr. Joe Peters     Current Outpatient Medications   Medication Sig Dispense Refill    dilTIAZem ER (CARDIZEM CD) 240 mg capsule TAKE 1 CAPSULE TWICE A  Cap 3    fish oil-omega-3 fatty acids 340-1,000 mg capsule Take 1,000 mg by mouth.  latanoprost (XALATAN) 0.005 % ophthalmic solution Administer 1 Drop to both eyes daily.  brimonidine (ALPHAGAN) 0.2 % ophthalmic solution Administer 1 Drop to both eyes two (2) times a day.  aspirin 81 mg tablet Take 2 Tabs by mouth daily. 1 Tab 0    ZINC MTH/COPPER/SAW PALM/GNSG (PROSTATE HEALTH FORMULA PO) Take 1 Tab by mouth daily. Allergies   Allergen Reactions    Shellfish Containing Products Swelling and Other (comments)       History reviewed. No pertinent family history. Social History     Tobacco Use    Smoking status: Former Smoker     Packs/day: 0.25     Years: 1.00     Pack years: 0.25     Last attempt to quit: 1981     Years since quittin.3    Smokeless tobacco: Never Used   Substance Use Topics    Alcohol use: No       Depression Risk Factor Screening:     3 most recent PHQ Screens 2020   Little interest or pleasure in doing things Not at all   Feeling down, depressed, irritable, or hopeless Not at all   Total Score PHQ 2 0       Alcohol Risk Factor Screening (MALE > 65): Do you average more 1 drink per night or more than 7 drinks a week: No    In the past three months have you have had more than 4 drinks containing alcohol on one occasion: No      Functional Ability and Level of Safety:   Hearing: patient states he does not have any hearing loss. Activities of Daily Living: The home contains:  Patient states he has handrails and grab bars. Patient is self care. Ambulation: without difficulty. Fall Risk:  Fall Risk Assessment, last 12 mths 2020   Able to walk? Yes   Fall in past 12 months? No   Fall with injury? -   Number of falls in past 12 months -   Fall Risk Score -     Abuse Screen:  Patient is not abused.        Cognitive Screening   Has your family/caregiver stated any concerns about your memory: No     Cognitive Screening: Normal - Clock Drawing Test    Patient Care Team   Patient Care Team:  Leah Duke MD as PCP - General  Leah Duke MD as PCP - [address] [place] [address] [facility] [place] [facility]  Luis Miguel Inman MD (Cardiology)  Abundio Farias MD as Hospitalist (Radiation Oncology)  Catarina Nair MD (Ophthalmology)  Tucker Yang MD (Urology)    Glaucoma Screening-  pt following for glaucoma   Pneumonia Vaccine-UTD  Shingles Vaccine-  Declines currently.    Tdap Vaccine-  10/2013    Colonoscopy-  10/2014 Dr Hernán Gonzalez normal f/u in 10 yrs    PSA- followed by Urology for prostate cancer status post external beam radiation  Advance Directive-  on file    Assessment/Plan   Education and counseling provided:  Are appropriate based on today's review and evaluation  End-of-Life planning (with patient's consent)    Diagnoses and all orders for this visit:    1. Medicare annual wellness visit, subsequent    2. Essential hypertension  -     METABOLIC PANEL, COMPREHENSIVE; Future  -     LIPID PANEL; Future    3. Paroxysmal atrial fibrillation (HCC)    4. Chronic midline low back pain without sciatica        Health Maintenance Due   Topic Date Due    Shingrix Vaccine Age 49> (1 of 2) 11/30/1989    Pneumococcal 65+ years (2 of 2 - PPSV23) 07/26/2017    GLAUCOMA SCREENING Q2Y  09/08/2019     A comprehensive 5 year plan for medical care and screening exams was reviewed with pt and they received a copy of it.

## 2020-07-30 NOTE — PROGRESS NOTES
Krystina Garcia is a [de-identified] y.o.  male and presents with Hypertension (f/u); Annual Wellness Visit; Back Pain (needs back brace  ); and Irregular Heart Beat      SUBJECTIVE:  Pt's HTN and Afib is well controlled on Cardizem. His cholesterol remains borderline controlled. He is currently a candidate for statin therapy but is currently declining using more medication. He will also try to exercise more to improve his cholesterol. Pt continues to be followed by cardiology for h/o PAF    Patient is followed by urology for history of prostate cancer. He had external beam radiation in the past.    Back Pain  Patient presents for presents evaluation of low back problems. Symptoms have been present for several months and include pain in lower back (aching in character; 8/10 in severity). Initial inciting event: worst when he goes walking for exercise. Symptoms are worst: morning. Alleviating factors identifiable by patient are recumbency. Exacerbating factors identifiable by patient are standing, walking. Treatments so far initiated by patient: tylenol prn Previous lower back problems: yes. Previous workup: none. Previous treatments: as above. Pt would benefit from back brace to support weak core muscles and decrease back pain especially when walking. Lindsay Ask Respiratory ROS: negative for - shortness of breath  Cardiovascular ROS: negative for - chest pain    Current Outpatient Medications   Medication Sig    dilTIAZem ER (CARDIZEM CD) 240 mg capsule TAKE 1 CAPSULE TWICE A DAY    fish oil-omega-3 fatty acids 340-1,000 mg capsule Take 1,000 mg by mouth.  latanoprost (XALATAN) 0.005 % ophthalmic solution Administer 1 Drop to both eyes daily.  brimonidine (ALPHAGAN) 0.2 % ophthalmic solution Administer 1 Drop to both eyes two (2) times a day.  aspirin 81 mg tablet Take 2 Tabs by mouth daily.  ZINC MTH/COPPER/SAW PALM/GNSG (PROSTATE HEALTH FORMULA PO) Take 1 Tab by mouth daily.      No current facility-administered medications for this visit. OBJECTIVE:  alert, well appearing, and in no distress  Visit Vitals  /70 (BP 1 Location: Left arm, BP Patient Position: Sitting)   Pulse 69   Temp 97.8 °F (36.6 °C) (Temporal)   Resp 18   Ht 6' 2\" (1.88 m)   Wt 218 lb (98.9 kg)   SpO2 97%   BMI 27.99 kg/m²      well developed and well nourished  Neck - supple, no significant adenopathy  Chest - clear to auscultation, no wheezes, rales or rhonchi, symmetric air entry  Heart - normal rate, regular rhythm, normal S1, S2, no murmurs, rubs, clicks or gallops  Extremities - peripheral pulses normal, no pedal edema, no clubbing or cyanosis  Skin - normal coloration and turgor, no rashes, no suspicious skin lesions noted  Back exam - limited range of motion, pain with motion noted during exam, tenderness noted in paraspinal muscles in lumbar area, negative straight-leg raise bilaterally         Labs:   Lab Results   Component Value Date/Time    Cholesterol, total 167 07/23/2020 08:30 AM    HDL Cholesterol 51 07/23/2020 08:30 AM    LDL, calculated 106 (H) 07/23/2020 08:30 AM    Triglyceride 50 07/23/2020 08:30 AM    CHOL/HDL Ratio 3.3 07/23/2020 08:30 AM     Lab Results   Component Value Date/Time    Alk. phosphatase 89 07/23/2020 08:30 AM     Lab Results   Component Value Date/Time    GFR est AA >60 07/23/2020 08:30 AM    GFR est non-AA 58 (L) 07/23/2020 08:30 AM    Creatinine 1.20 07/23/2020 08:30 AM    BUN 15 07/23/2020 08:30 AM    Sodium 141 07/23/2020 08:30 AM    Potassium 5.1 07/23/2020 08:30 AM    Chloride 108 07/23/2020 08:30 AM    CO2 28 07/23/2020 08:30 AM      Lab Results   Component Value Date/Time    Glucose 94 07/23/2020 08:30 AM        Assessment/Plan      ICD-10-CM ICD-9-CM    1. Medicare annual wellness visit, subsequent  Z00.00 V70.0    2. Essential hypertension  I10 401.9 Stable on Cardizem METABOLIC PANEL, COMPREHENSIVE      LIPID PANEL   3.  Paroxysmal atrial fibrillation (HCC)  I48.0 427.31 Well controled on Cardizem. Pt is followed by Cardiology    4. Chronic midline low back pain without sciatica  M54.5 724.2 Pt would benefit from a back brace. G89.29 338.29                 Follow-up and Dispositions    · Return in about 6 months (around 1/30/2021) for labs 1 week before. Reviewed plan of care. Patient has provided input and agrees with goals.

## 2020-09-22 ENCOUNTER — OFFICE VISIT (OUTPATIENT)
Dept: CARDIOLOGY CLINIC | Age: 80
End: 2020-09-22
Payer: MEDICARE

## 2020-09-22 VITALS
HEIGHT: 74 IN | OXYGEN SATURATION: 97 % | DIASTOLIC BLOOD PRESSURE: 60 MMHG | WEIGHT: 218 LBS | SYSTOLIC BLOOD PRESSURE: 120 MMHG | HEART RATE: 62 BPM | BODY MASS INDEX: 27.98 KG/M2

## 2020-09-22 DIAGNOSIS — E78.5 DYSLIPIDEMIA: ICD-10-CM

## 2020-09-22 DIAGNOSIS — I49.5 SICK SINUS SYNDROME (HCC): ICD-10-CM

## 2020-09-22 DIAGNOSIS — Z95.0 CARDIAC PACEMAKER IN SITU: ICD-10-CM

## 2020-09-22 DIAGNOSIS — I10 ESSENTIAL HYPERTENSION: ICD-10-CM

## 2020-09-22 DIAGNOSIS — I48.0 PAROXYSMAL ATRIAL FIBRILLATION (HCC): Primary | ICD-10-CM

## 2020-09-22 PROCEDURE — 1101F PT FALLS ASSESS-DOCD LE1/YR: CPT | Performed by: INTERNAL MEDICINE

## 2020-09-22 PROCEDURE — G8752 SYS BP LESS 140: HCPCS | Performed by: INTERNAL MEDICINE

## 2020-09-22 PROCEDURE — 99214 OFFICE O/P EST MOD 30 MIN: CPT | Performed by: INTERNAL MEDICINE

## 2020-09-22 PROCEDURE — G8427 DOCREV CUR MEDS BY ELIG CLIN: HCPCS | Performed by: INTERNAL MEDICINE

## 2020-09-22 PROCEDURE — G8417 CALC BMI ABV UP PARAM F/U: HCPCS | Performed by: INTERNAL MEDICINE

## 2020-09-22 PROCEDURE — 93280 PM DEVICE PROGR EVAL DUAL: CPT | Performed by: INTERNAL MEDICINE

## 2020-09-22 PROCEDURE — G8754 DIAS BP LESS 90: HCPCS | Performed by: INTERNAL MEDICINE

## 2020-09-22 PROCEDURE — G8510 SCR DEP NEG, NO PLAN REQD: HCPCS | Performed by: INTERNAL MEDICINE

## 2020-09-22 PROCEDURE — G8536 NO DOC ELDER MAL SCRN: HCPCS | Performed by: INTERNAL MEDICINE

## 2020-09-22 NOTE — PROGRESS NOTES
HISTORY OF PRESENT ILLNESS  Bernard Correia is a [de-identified] y.o. male. Follow-up   Pertinent negatives include no chest pain, no abdominal pain, no headaches and no shortness of breath. Leg Swelling   Pertinent negatives include no chest pain, no abdominal pain, no headaches and no shortness of breath. Patient presents for an overdue followup visit. He has a past medical history significant for paroxysmal atrial fibrillation, hypertension, and sick sinus syndrome, status post dual-chamber permanent pacemaker in March 2011. He underwent an exercise nuclear stress test in February 2019 which was a normal low risk study. He exercised 8 minutes on the treadmill using the Raghu protocol without any EKG changes or symptoms. No ischemia. EF 72%. He was last seen in our office 13 months ago. Since last visit, he states he has been feeling well. He denies any heart palpitations, dizziness or syncope. No chest pain or shortness of breath. He does get some leg swelling at the end of the day primarily with his left lower extremity greater than right lower extreme but this always improves at night. No major change in his activity tolerance. Past Medical History:   Diagnosis Date    Atrial fibrillation (HCC)     CHADS 0  (-CHF, -HTN, -AGE, -DM, -CVA)    Cardiac echocardiogram 06/29/2010    EF 70-75%. Mild conc LVh. Gr 1 DDfx. Mild DELMY.  Cardiac Holter monitor, normal 06/28/2010    Normal Holter study.  Cardiac nuclear imaging test 11/22/2010    Sm area of apical ischemia and inferolateral scar, both possibly apical thinning. No WMA. EF 60%.  Impotence of organic origin     Pacemaker     3/11  MEDTRONIC    Personal history of malignant neoplasm of prostate     T1a, [place] 6 (3+3)     Prostate cancer ([facility] [address]     PUD (peptic ulcer disease)     Sick sinus syndrome ([facility] [address]     status post implantation of Medtronic dual-chamber permanent pacemaker 3/22/11.     Unspecified essential hypertension  Venereal disease       Current Outpatient Medications   Medication Sig Dispense Refill    dilTIAZem ER (CARDIZEM CD) 240 mg capsule TAKE 1 CAPSULE TWICE A  Cap 3    fish oil-omega-3 fatty acids 340-1,000 mg capsule Take 1,000 mg by mouth.  latanoprost (XALATAN) 0.005 % ophthalmic solution Administer 1 Drop to both eyes daily.  brimonidine (ALPHAGAN) 0.2 % ophthalmic solution Administer 1 Drop to both eyes two (2) times a day.  aspirin 81 mg tablet Take 2 Tabs by mouth daily. 1 Tab 0    ZINC MTH/COPPER/SAW PALM/GNSG (PROSTATE HEALTH FORMULA PO) Take 1 Tab by mouth daily. Allergies   Allergen Reactions    Shellfish Containing Products Swelling and Other (comments)      Social History     Tobacco Use    Smoking status: Former Smoker     Packs/day: 0.25     Years: 1.00     Pack years: 0.25     Last attempt to quit: 1981     Years since quittin.4    Smokeless tobacco: Never Used   Substance Use Topics    Alcohol use: No    Drug use: No         Review of Systems   Constitutional: Negative for chills, fever and weight loss. HENT: Negative for nosebleeds. Eyes: Negative for blurred vision and double vision. Respiratory: Negative for cough, shortness of breath and wheezing. Cardiovascular: Positive for leg swelling. Negative for chest pain, palpitations, orthopnea, claudication and PND. Gastrointestinal: Negative for abdominal pain, heartburn, nausea and vomiting. Genitourinary: Negative for dysuria and hematuria. Musculoskeletal: Negative for falls and myalgias. Skin: Negative for rash. Neurological: Negative for dizziness, focal weakness and headaches. Endo/Heme/Allergies: Does not bruise/bleed easily. Psychiatric/Behavioral: Negative for substance abuse.      Visit Vitals  /60 (BP 1 Location: Left arm, BP Patient Position: Sitting)   Pulse 62   Ht 6' 2\" (1.88 m)   Wt 98.9 kg (218 lb)   SpO2 97%   BMI 27.99 kg/m²      Physical Exam Constitutional: He is oriented to person, place, and time. He appears well-developed and well-nourished. HENT:   Head: Normocephalic and atraumatic. Eyes: Conjunctivae are normal.   Neck: Neck supple. No JVD present. Carotid bruit is not present. Cardiovascular: Normal rate, regular rhythm, S1 normal, S2 normal and normal pulses. Exam reveals no gallop and no S3. No murmur heard. Pulmonary/Chest: Breath sounds normal. He has no wheezes. He has no rales. Abdominal: Soft. Bowel sounds are normal. There is no abdominal tenderness. Musculoskeletal:         General: No edema. Neurological: He is alert and oriented to person, place, and time. Skin: Skin is warm and dry. EKG: Atrial paced rhythm, intrinsic QRS conduction with an incomplete right bundle branch block, no ST or T wave abnormalities. No change compared to the previous EKG. Pacemaker interrogation. Battery life estimated at 22 months. No atrial fibrillation since last device check. Pacing in the atrium 98%, and in the ventricle 0%. Scanned document for details. ASSESSMENT and PLAN    Paroxysmal atrial fibrillation. No recurrent episodes of atrial fibrillation over the past 12 months. His last episode of atrial fibrillation was in June 2019. Prior to that his previous episodes were 2 days of intermittent atrial fibrillation in November 2018 longest episode lasted for nearly 8 hours. I would continue his regimen of Cardizem and aspirin for now. However, would have a low threshold to start oral anticoagulation if he starts having more frequent episodes. Sick sinus syndrome. Status post dual-chamber permanent pacemaker. Normal device function on interrogation. The patient continues to pace the majority of the time in the atrium and 0% in the ventricle. Battery life estimated at 22 months. Essential hypertension. Patient blood pressure is well controlled on diltiazem as monotherapy. Dyslipidemia.   This is only been mildly elevated in the past.  This is followed closely by his PCP. The patient works on lifestyle modification.       Follow-up in 6 months, sooner if needed

## 2020-09-22 NOTE — PROGRESS NOTES
David Montoya presents today for   Chief Complaint   Patient presents with    Follow-up     1 year follow up     Leg Swelling     mild       David Montoya preferred language for health care discussion is english/other. Is someone accompanying this pt? no    Is the patient using any DME equipment during [address]? no    Depression Screening:  3 most recent PHQ Screens 9/22/2020   Little interest or pleasure in doing things Not at all   Feeling down, depressed, irritable, or hopeless Not at all   Total Score PHQ 2 0       Learning Assessment:  Learning Assessment 8/15/2019   PRIMARY LEARNER Patient   HIGHEST LEVEL OF EDUCATION - PRIMARY LEARNER  -   BARRIERS PRIMARY LEARNER -   CO-LEARNER CAREGIVER -   PRIMARY LANGUAGE ENGLISH   LEARNER PREFERENCE PRIMARY DEMONSTRATION     -   ANSWERED BY Patient   RELATIONSHIP SELF       Abuse Screening:  Abuse Screening Questionnaire 7/30/2020   Do you ever feel afraid of your partner? N   Are you in a relationship with someone who physically or mentally threatens you? N   Is it safe for you to go home? Y       Fall Risk  Fall Risk Assessment, last 12 mths 9/22/2020   Able to walk? Yes   Fall in past 12 months? No   Fall with injury? -   Number of falls in past 12 months -   Fall Risk Score -       Pt currently taking Anticoagulant therapy? ASA 81mg every day     Coordination of Care:  1. Have you been to the ER, urgent care clinic since your last visit? Hospitalized since your last visit? no    2. Have you seen or consulted any other health care providers outside of the [address] [place] [address] since your last visit? Include any pap smears or colon screening.  no

## 2020-09-23 ENCOUNTER — OFFICE VISIT (OUTPATIENT)
Dept: INTERNAL MEDICINE CLINIC | Age: 80
End: 2020-09-23
Payer: MEDICARE

## 2020-09-23 DIAGNOSIS — Z23 NEEDS FLU SHOT: Primary | ICD-10-CM

## 2020-09-23 PROCEDURE — 90694 VACC AIIV4 NO PRSRV 0.5ML IM: CPT | Performed by: INTERNAL MEDICINE

## 2021-01-22 ENCOUNTER — HOSPITAL ENCOUNTER (OUTPATIENT)
Dept: LAB | Age: 81
Discharge: HOME | End: 2021-01-22
Payer: MEDICARE

## 2021-01-22 ENCOUNTER — APPOINTMENT (OUTPATIENT)
Dept: INTERNAL MEDICINE CLINIC | Age: 81
End: 2021-01-22

## 2021-01-22 DIAGNOSIS — I10 ESSENTIAL HYPERTENSION: ICD-10-CM

## 2021-01-22 LAB
ALBUMIN SERPL-MCNC: 3.9 G/DL (ref 3.4–5)
ALBUMIN/GLOB SERPL: 1.2 {RATIO} (ref 0.8–1.7)
ALP SERPL-CCNC: 108 U/L (ref 45–117)
ALT SERPL-CCNC: 27 U/L (ref 16–61)
ANION GAP SERPL CALC-SCNC: 6 MMOL/L (ref 3–18)
AST SERPL-CCNC: 18 U/L (ref 10–38)
BILIRUB SERPL-MCNC: 0.6 MG/DL (ref 0.2–1)
BUN SERPL-MCNC: 14 MG/DL (ref 7–18)
BUN/CREAT SERPL: 11 (ref 12–20)
CALCIUM SERPL-MCNC: 9.1 MG/DL (ref 8.5–10.1)
CHLORIDE SERPL-SCNC: 108 MMOL/L (ref 100–111)
CHOLEST SERPL-MCNC: 165 MG/DL
CO2 SERPL-SCNC: 29 MMOL/L (ref 21–32)
CREAT SERPL-MCNC: 1.25 MG/DL (ref 0.6–1.3)
GLOBULIN SER CALC-MCNC: 3.2 G/DL (ref 2–4)
GLUCOSE SERPL-MCNC: 99 MG/DL (ref 74–99)
HDLC SERPL-MCNC: 51 MG/DL (ref 40–60)
HDLC SERPL: 3.2 {RATIO} (ref 0–5)
LDLC SERPL CALC-MCNC: 101.4 MG/DL (ref 0–100)
LIPID PROFILE,FLP: ABNORMAL
POTASSIUM SERPL-SCNC: 4.9 MMOL/L (ref 3.5–5.5)
PROT SERPL-MCNC: 7.1 G/DL (ref 6.4–8.2)
SODIUM SERPL-SCNC: 143 MMOL/L (ref 136–145)
TRIGL SERPL-MCNC: 63 MG/DL (ref ?–150)
VLDLC SERPL CALC-MCNC: 12.6 MG/DL

## 2021-01-22 PROCEDURE — 80053 COMPREHEN METABOLIC PANEL: CPT

## 2021-01-22 PROCEDURE — 36415 COLL VENOUS BLD VENIPUNCTURE: CPT

## 2021-01-22 PROCEDURE — 80061 LIPID PANEL: CPT

## 2021-01-28 ENCOUNTER — OFFICE VISIT (OUTPATIENT)
Dept: INTERNAL MEDICINE CLINIC | Age: 81
End: 2021-01-28
Payer: MEDICARE

## 2021-01-28 VITALS
HEART RATE: 66 BPM | SYSTOLIC BLOOD PRESSURE: 122 MMHG | TEMPERATURE: 97.2 F | DIASTOLIC BLOOD PRESSURE: 65 MMHG | OXYGEN SATURATION: 98 % | BODY MASS INDEX: 28.23 KG/M2 | HEIGHT: 74 IN | WEIGHT: 220 LBS | RESPIRATION RATE: 18 BRPM

## 2021-01-28 DIAGNOSIS — I48.0 PAROXYSMAL ATRIAL FIBRILLATION (HCC): ICD-10-CM

## 2021-01-28 DIAGNOSIS — I10 ESSENTIAL HYPERTENSION: Primary | ICD-10-CM

## 2021-01-28 DIAGNOSIS — M25.562 ACUTE PAIN OF LEFT KNEE: ICD-10-CM

## 2021-01-28 PROCEDURE — 1101F PT FALLS ASSESS-DOCD LE1/YR: CPT | Performed by: INTERNAL MEDICINE

## 2021-01-28 PROCEDURE — G8754 DIAS BP LESS 90: HCPCS | Performed by: INTERNAL MEDICINE

## 2021-01-28 PROCEDURE — 99214 OFFICE O/P EST MOD 30 MIN: CPT | Performed by: INTERNAL MEDICINE

## 2021-01-28 PROCEDURE — G0463 HOSPITAL OUTPT CLINIC VISIT: HCPCS | Performed by: INTERNAL MEDICINE

## 2021-01-28 PROCEDURE — G8752 SYS BP LESS 140: HCPCS | Performed by: INTERNAL MEDICINE

## 2021-01-28 PROCEDURE — G8510 SCR DEP NEG, NO PLAN REQD: HCPCS | Performed by: INTERNAL MEDICINE

## 2021-01-28 PROCEDURE — G8427 DOCREV CUR MEDS BY ELIG CLIN: HCPCS | Performed by: INTERNAL MEDICINE

## 2021-01-28 PROCEDURE — G8536 NO DOC ELDER MAL SCRN: HCPCS | Performed by: INTERNAL MEDICINE

## 2021-01-28 PROCEDURE — G8417 CALC BMI ABV UP PARAM F/U: HCPCS | Performed by: INTERNAL MEDICINE

## 2021-01-28 NOTE — PROGRESS NOTES
Emanuel Parrish is a 79 y.o.  male and presents with Hypertension (f/u), Knee Pain (Left knee ), and Irregular Heart Beat      SUBJECTIVE:  Pt's HTN and Afib is well controlled on Cardizem. His cholesterol remains borderline controlled. He is currently a candidate for statin therapy but is currently declining using more medication. He will also try to exercise more to improve his cholesterol. Pt continues to be followed by cardiology for h/o PAF. He takes ASA 81 mg daily. Patient is followed by urology for history of prostate cancer. He had external beam radiation in the past.    Knee Pain  Patient complains of left knee pain. Onset of the symptoms was 2 weeks ago. Inciting event: injured while lifting case of water up some stairs. Current symptoms include pain location: left: anterior. Associated symptoms: none. Aggravating symptoms: going up and down stairs, walking. Patient's overall course: symptoms have progressed to a point and plateaued. Patient has had no prior knee problems. Patient denies fever. Evaluation to date: none. Treatment to date: Bengay ointment. Respiratory ROS: negative for - shortness of breath  Cardiovascular ROS: negative for - chest pain    Current Outpatient Medications   Medication Sig    dilTIAZem ER (CARDIZEM CD) 240 mg capsule TAKE 1 CAPSULE TWICE A DAY    latanoprost (XALATAN) 0.005 % ophthalmic solution Administer 1 Drop to both eyes daily.  brimonidine (ALPHAGAN) 0.2 % ophthalmic solution Administer 1 Drop to both eyes two (2) times a day.  aspirin 81 mg tablet Take 2 Tabs by mouth daily.  ZINC MTH/COPPER/SAW PALM/GNSG (PROSTATE HEALTH FORMULA PO) Take 1 Tab by mouth daily.  fish oil-omega-3 fatty acids 340-1,000 mg capsule Take 1,000 mg by mouth. No current facility-administered medications for this visit.           OBJECTIVE:  alert, well appearing, and in no distress  Visit Vitals  /65 (BP 1 Location: Left arm, BP Patient Position: Sitting)   Pulse 66   Temp 97.2 °F (36.2 °C) (Temporal)   Resp 18   Ht 6' 2\" (1.88 m)   Wt 220 lb (99.8 kg)   SpO2 98%   BMI 28.25 kg/m²      well developed and well nourished  Neck - supple, no significant adenopathy  Chest - clear to auscultation, no wheezes, rales or rhonchi, symmetric air entry  Heart - normal rate, regular rhythm, normal S1, S2, no murmurs, rubs, clicks or gallops  Extremities - peripheral pulses normal, no pedal edema, no clubbing or cyanosis  Skin - normal coloration and turgor, no rashes, no suspicious skin lesions noted      Labs:   Lab Results   Component Value Date/Time    Cholesterol, total 165 01/22/2021 09:22 AM    HDL Cholesterol 51 01/22/2021 09:22 AM    LDL, calculated 101.4 (H) 01/22/2021 09:22 AM    Triglyceride 63 01/22/2021 09:22 AM    CHOL/HDL Ratio 3.2 01/22/2021 09:22 AM     Lab Results   Component Value Date/Time    Alk. phosphatase 108 01/22/2021 09:22 AM     Lab Results   Component Value Date/Time    GFR est AA >60 01/22/2021 09:22 AM    GFR est non-AA 55 (L) 01/22/2021 09:22 AM    Creatinine 1.25 01/22/2021 09:22 AM    BUN 14 01/22/2021 09:22 AM    Sodium 143 01/22/2021 09:22 AM    Potassium 4.9 01/22/2021 09:22 AM    Chloride 108 01/22/2021 09:22 AM    CO2 29 01/22/2021 09:22 AM      Lab Results   Component Value Date/Time    Glucose 99 01/22/2021 09:22 AM        Assessment/Plan      ICD-10-CM ICD-9-CM    1. Essential hypertension  I10 401.9 Well controlled on Cardizem METABOLIC PANEL, COMPREHENSIVE      LIPID PANEL   2. Paroxysmal atrial fibrillation (HCC)  I48.0 427.31 Well controlled on Cardizem and ASA. Pt followed by cardiology    3. Acute pain of left knee  M25.562 719.46 REFERRAL TO ORTHOPEDICS              Follow-up and Dispositions    · Return in about 6 months (around 7/28/2021) for OV, and Medicare Wellness Visit, labs 1 week before. Reviewed plan of care. Patient has provided input and agrees with goals.

## 2021-01-28 NOTE — PROGRESS NOTES
Patient is in the office today for a 3 month follow up. Patient states he is having left knee pain 4/10. 1. Have you been to the ER, urgent care clinic since your last visit? Hospitalized since your last visit? No    2. Have you seen or consulted any other health care providers outside of the [address] [place] [address] since your last visit? Include any pap smears or colon screening. Yes, [id number] podiatry. Patient states he also sees Jd Hancock.

## 2021-01-28 NOTE — PATIENT INSTRUCTIONS
High Blood Pressure: Care Instructions  Overview     It's normal for blood pressure to go up and down throughout the day. But if it stays up, you have high blood pressure. Another name for high blood pressure is hypertension. Despite what a lot of people think, high blood pressure usually doesn't cause headaches or make you feel dizzy or lightheaded. It usually has no symptoms. But it does increase your risk of stroke, heart attack, and other problems. You and your doctor will talk about your risks of these problems based on your blood pressure. Your doctor will give you a goal for your blood pressure. Your goal will be based on your health and your age. Lifestyle changes, such as eating healthy and being active, are always important to help lower blood pressure. You might also take medicine to reach your blood pressure goal.  Follow-up care is a key part of your treatment and safety. Be sure to make and go to all appointments, and call your doctor if you are having problems. It's also a good idea to know your test results and keep a list of the medicines you take. How can you care for yourself at home? Medical treatment  · If you stop taking your medicine, your blood pressure will go back up. You may take one or more types of medicine to lower your blood pressure. Be safe with medicines. Take your medicine exactly as prescribed. Call your doctor if you think you are having a problem with your medicine. · Talk to your doctor before you start taking aspirin every day. Aspirin can help certain people lower their risk of a heart attack or stroke. But taking aspirin isn't right for everyone, because it can cause serious bleeding. · See your doctor regularly. You may need to see the doctor more often at first or until your blood pressure comes down. · If you are taking blood pressure medicine, talk to your doctor before you take decongestants or anti-inflammatory medicine, such as ibuprofen.  Some of these medicines can raise blood pressure. · Learn how to check your blood pressure at home. Lifestyle changes  · Stay at a healthy weight. This is especially important if you put on weight around the waist. Losing even 10 pounds can help you lower your blood pressure. · If your doctor recommends it, get more exercise. Walking is a good choice. Bit by bit, increase the amount you walk every day. Try for at least 30 minutes on most days of the week. You also may want to swim, bike, or do other activities. · Avoid or limit alcohol. Talk to your doctor about whether you can drink any alcohol. · Try to limit how much sodium you eat to less than 2,300 milligrams (mg) a day. Your doctor may ask you to try to eat less than 1,500 mg a day. · Eat plenty of fruits (such as bananas and oranges), vegetables, legumes, whole grains, and low-fat dairy products. · Lower the amount of saturated fat in your diet. Saturated fat is found in animal products such as milk, cheese, and meat. Limiting these foods may help you lose weight and also lower your risk for heart disease. · Do not smoke. Smoking increases your risk for heart attack and stroke. If you need help quitting, talk to your doctor about stop-smoking programs and medicines. These can increase your chances of quitting for good. When should you call for help? Call  911 anytime you think you may need emergency care. This may mean having symptoms that suggest that your blood pressure is causing a serious heart or blood vessel problem. Your blood pressure may be over 180/120. For example, call 911 if:    · You have symptoms of a heart attack. These may include:  ? Chest pain or pressure, or a strange feeling in the chest.  ? Sweating. ? Shortness of breath. ? Nausea or vomiting. ? Pain, pressure, or a strange feeling in the back, neck, jaw, or upper belly or in one or both shoulders or arms. ? Lightheadedness or sudden weakness.   ? A fast or irregular heartbeat.     · You have symptoms of a stroke. These may include:  ? Sudden numbness, tingling, weakness, or loss of movement in your face, arm, or leg, especially on only one side of your body. ? Sudden vision changes. ? Sudden trouble speaking. ? Sudden confusion or trouble understanding simple statements. ? Sudden problems with walking or balance. ? A sudden, severe headache that is different from past headaches.     · You have severe back or belly pain. Do not wait until your blood pressure comes down on its own. Get help right away. Call your doctor now or seek immediate care if:    · Your blood pressure is much higher than normal (such as 180/120 or higher), but you don't have symptoms.     · You think high blood pressure is causing symptoms, such as:  ? Severe headache.  ? Blurry vision. Watch closely for changes in your health, and be sure to contact your doctor if:    · Your blood pressure measures higher than your doctor recommends at least 2 times. That means the top number is higher or the bottom number is higher, or both.     · You think you may be having side effects from your blood pressure medicine. Where can you learn more? Go to http://www.gray.com/  Enter [id number] in the search box to learn more about \"High Blood Pressure: Care Instructions. \"  Current as of: December 16, 2019               Content Version: 12.6  © 5822-7963 [employer]. Care instructions adapted under license by [employer] (which disclaims liability or warranty for this information). If you have questions about a medical condition or this instruction, always ask your healthcare professional. [address] any warranty or liability for your use of this information.

## 2021-02-05 ENCOUNTER — OFFICE VISIT (OUTPATIENT)
Dept: ORTHOPEDIC SURGERY | Age: 81
End: 2021-02-05
Payer: MEDICARE

## 2021-02-05 VITALS
TEMPERATURE: 97 F | DIASTOLIC BLOOD PRESSURE: 74 MMHG | WEIGHT: 221.2 LBS | SYSTOLIC BLOOD PRESSURE: 130 MMHG | OXYGEN SATURATION: 100 % | BODY MASS INDEX: 28.39 KG/M2 | HEART RATE: 67 BPM | HEIGHT: 74 IN | RESPIRATION RATE: 16 BRPM

## 2021-02-05 DIAGNOSIS — G89.29 CHRONIC PAIN OF LEFT KNEE: ICD-10-CM

## 2021-02-05 DIAGNOSIS — M25.562 CHRONIC PAIN OF LEFT KNEE: ICD-10-CM

## 2021-02-05 DIAGNOSIS — M25.462 EFFUSION OF LEFT KNEE: ICD-10-CM

## 2021-02-05 DIAGNOSIS — M17.12 PRIMARY OSTEOARTHRITIS OF LEFT KNEE: Primary | ICD-10-CM

## 2021-02-05 PROCEDURE — G8752 SYS BP LESS 140: HCPCS | Performed by: SPECIALIST

## 2021-02-05 PROCEDURE — 73562 X-RAY EXAM OF KNEE 3: CPT | Performed by: SPECIALIST

## 2021-02-05 PROCEDURE — G8754 DIAS BP LESS 90: HCPCS | Performed by: SPECIALIST

## 2021-02-05 PROCEDURE — G8432 DEP SCR NOT DOC, RNG: HCPCS | Performed by: SPECIALIST

## 2021-02-05 PROCEDURE — G8427 DOCREV CUR MEDS BY ELIG CLIN: HCPCS | Performed by: SPECIALIST

## 2021-02-05 PROCEDURE — 1101F PT FALLS ASSESS-DOCD LE1/YR: CPT | Performed by: SPECIALIST

## 2021-02-05 PROCEDURE — 20610 DRAIN/INJ JOINT/BURSA W/O US: CPT | Performed by: SPECIALIST

## 2021-02-05 PROCEDURE — 99203 OFFICE O/P NEW LOW 30 MIN: CPT | Performed by: SPECIALIST

## 2021-02-05 PROCEDURE — G8536 NO DOC ELDER MAL SCRN: HCPCS | Performed by: SPECIALIST

## 2021-02-05 PROCEDURE — G8417 CALC BMI ABV UP PARAM F/U: HCPCS | Performed by: SPECIALIST

## 2021-02-05 RX ORDER — BETAMETHASONE SODIUM PHOSPHATE AND BETAMETHASONE ACETATE 3; 3 MG/ML; MG/ML
3 INJECTION, SUSPENSION INTRA-ARTICULAR; INTRALESIONAL; INTRAMUSCULAR; SOFT TISSUE ONCE
Status: COMPLETED | OUTPATIENT
Start: 2021-02-05 | End: 2021-02-05

## 2021-02-05 RX ADMIN — BETAMETHASONE SODIUM PHOSPHATE AND BETAMETHASONE ACETATE 3 MG: 3; 3 INJECTION, SUSPENSION INTRA-ARTICULAR; INTRALESIONAL; INTRAMUSCULAR; SOFT TISSUE at 09:04

## 2021-02-05 NOTE — PROGRESS NOTES
Patient: Jayesh Bravo                MRN: [id number]       SSN: [id number]  YOB: 1940        AGE: 79 y.o. SEX: male    PCP: Yosef Rodriges MD  02/05/21    CC: LEFT KNEE PAIN AND EFFUSION    HISTORY:  Jayesh Bravo is a 79 y.o. male who was referred by Dr. Cat Rivas for evaluation and treatment of  left knee pain and swelling. He has been experiencing left knee pain for the past two months. 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 He felt a snapping sensation when he supported the water pack on his left knee as he opened the screen door. His knee pain has worsened since that episode. He feels increased knee pain if he walks around his neighborhood for more than three days in a row. He feels pain with standing and stair climbing. He experiences startup pain after sitting. Pain Assessment  2/5/2021   Location of Pain Knee   Location Modifiers Left   Severity of Pain 3   Quality of Pain Throbbing;Aching   Duration of Pain A few days   Frequency of Pain Intermittent   Aggravating Factors Walking   Limiting Behavior No   Relieving Factors Rest   Result of Injury No     Occupation, etc:  Mr. Drew Chicas is retired. He retired from [employer] after 25 years. He also worked as a  for the [employer]. He lives alone in [facility]. His wife passed away a year ago. He has a son in [place] and a son in [place]. Both of his sons are retired . He has 2 granddaughters and 1 grandson. He is active in his [employer] community and volunteers for friends and neighbors. He used to play [employer]. He has not been able to exercise since his knee started hurting. Mr. Drew Chicas weighs 221 lbs and is 6'2\" tall.        No results found for: HBA1C, HGBE8, DOC0TQDP, XII4BWDH, JRQ9TBQO  Weight Metrics 2/5/2021 1/28/2021 10/12/2020 9/22/2020 7/30/2020 3/12/2020 1/23/2020   Weight 221 lb 3.2 oz 220 lb 217 lb 218 lb 218 lb 215 lb 210 lb   BMI 28.4 kg/m2 28.25 kg/m2 27.86 kg/m2 27.99 kg/m2 27.99 kg/m2 27.6 kg/m2 26.96 kg/m2       Patient Active Problem List   Diagnosis Code    Atrial fibrillation (HCC) I48.91    Sick sinus syndrome (HCC) I49.5    Impotence of organic origin N52.9    Personal history of malignant neoplasm of prostate Z85.46    Elevated prostate specific antigen (PSA) R97.20    HTN (hypertension) I10    Dyslipidemia E78.5    Essential hypertension I10    ACP (advance care planning) Z71.89    Cardiac pacemaker in situ Z95.0     REVIEW OF SYSTEMS:    Constitutional Symptoms: Negative   Eyes: Negative   Ears, Nose, Throat and Mouth: Negative   Cardiovascular: Negative   Respiratory: Negative   Genitourinary: Per HPI   Gastrointestinal: Per HPI   Integumentary (Skin and/or Breast): Negative   Musculoskeletal: Per HPI   Endocrine/Rheumatologic: Negative   Neurological: Per HPI   Hematology/Lymphatic: Negative    Allergic/Immunologic: Negative   Phychiatric: Negative    Social History     Socioeconomic History    Marital status:      Spouse name: Not on file    Number of children: Not on file    Years of education: Not on file    Highest education level: Not on file   Occupational History    Not on file   Social Needs    Financial resource strain: Not on file    Food insecurity     Worry: Not on file     Inability: Not on file    Transportation needs     Medical: Not on file     Non-medical: Not on file   Tobacco Use    Smoking status: Former Smoker     Packs/day: 0.25     Years: 1.00     Pack years: 0.25     Quit date: 1981     Years since quittin.8    Smokeless tobacco: Never Used   Substance and Sexual Activity    Alcohol use: No    Drug use: No    Sexual activity: Not on file   Lifestyle    Physical activity     Days per week: Not on file     Minutes per session: Not on file    Stress: Not on file   Relationships    Social connections     Talks on phone: Not on file     Gets together: Not on file Attends Holiness service: Not on file     Active member of club or organization: Not on file     Attends meetings of clubs or organizations: Not on file     Relationship status: Not on file    Intimate partner violence     Fear of current or ex partner: Not on file     Emotionally abused: Not on file     Physically abused: Not on file     Forced sexual activity: Not on file   Other Topics Concern    Not on file   Social History Narrative    Not on file      Allergies   Allergen Reactions    Shellfish Containing Products Swelling and Other (comments)      Current Outpatient Medications   Medication Sig    multivitamin, tx-iron-ca-min (THERA-M w/ IRON) 9 mg iron-400 mcg tab tablet Take 1 Tab by mouth daily.  dilTIAZem ER (CARDIZEM CD) 240 mg capsule TAKE 1 CAPSULE TWICE A DAY    fish oil-omega-3 fatty acids 340-1,000 mg capsule Take 1,000 mg by mouth.  latanoprost (XALATAN) 0.005 % ophthalmic solution Administer 1 Drop to both eyes daily.  brimonidine (ALPHAGAN) 0.2 % ophthalmic solution Administer 1 Drop to both eyes two (2) times a day.  aspirin 81 mg tablet Take 2 Tabs by mouth daily.  ZINC MTH/COPPER/SAW PALM/GNSG (PROSTATE HEALTH FORMULA PO) Take 1 Tab by mouth daily. No current facility-administered medications for this visit.        PHYSICAL EXAMINATION:  Visit Vitals  /74 (BP 1 Location: Left upper arm)   Pulse 67   Temp 97 °F (36.1 °C) (Temporal)   Resp 16   Ht 6' 2\" (1.88 m)   Wt 221 lb 3.2 oz (100.3 kg)   SpO2 100%   BMI 28.40 kg/m²      ORTHO EXAMINATION:  Examination Right knee Left knee   Skin Intact Intact   Range of motion 120-0 115-0   Effusion - ++   Medial joint line tenderness - +   Lateral joint line tenderness - -   Popliteal tenderness - -   Osteophytes palpable - +   Stephanies - -   Patella crepitus - +   Anterior drawer - -   Lateral laxity - -   Medial laxity - -   Varus deformity - -   Valgus deformity - -   Pretibial edema - -   Calf tenderness - - TIME OUT:  Chart reviewed for the following:   Arya Mendoza MD, have reviewed the History, Physical and updated the Allergic reactions for [address] performed immediately prior to start of procedure:  Arya Mendoza MD, have performed the following reviews on Zulema Resendez prior to the start of the procedure:          * Patient was identified by name and date of birth   * Agreement on procedure being performed was verified  * Risks and Benefits explained to the patient  * Procedure site verified and marked as necessary  * Patient was positioned for comfort  * Consent was obtained     Time: 8:51 AM     Date of procedure: 2/5/2021  Procedure performed by:  Maggie Gillette MD  Mr. Oralia Bishop tolerated the procedure well with no complications. RADIOGRAPHS:  XR LEFT KNEE 2/5/21 DEDRICK  IMPRESSION:  Three views with bilateral knees on AP view - No fractures, no effusion, moderate lateral joint space narrowing, no osteophytes present. Kellgren Dean grade 2    IMPRESSION:      ICD-10-CM ICD-9-CM    1. Primary osteoarthritis of left knee  M17.12 715.16 betamethasone (CELESTONE) injection 3 mg      DRAIN/INJECT LARGE JOINT/BURSA      PROCEDURE AUTHORIZATION TO    2. Chronic pain of left knee  M25.562 719.46 AMB POC X-RAY KNEE 3 VIEW    G89.29 338.29 betamethasone (CELESTONE) injection 3 mg      DRAIN/INJECT LARGE JOINT/BURSA      PROCEDURE AUTHORIZATION TO    3. Effusion of left knee  M25.462 719.06      PLAN: Consider visco supplementation if pain continues. After timeout and under sterile conditions, left knee aspirated 20 cc of blood tinged fluid. The fluid was discarded. After discussing treatment options, patient's left knee was injected with 4 cc Marcaine and 1/2 cc Celestone. There is no need for surgery at this time. He will follow up as needed.       Scribed by Chang Noe ([address]) as dictated by Maggie Gillette MD

## 2021-03-19 ENCOUNTER — OFFICE VISIT (OUTPATIENT)
Dept: ORTHOPEDIC SURGERY | Age: 81
End: 2021-03-19
Payer: MEDICARE

## 2021-03-19 VITALS
SYSTOLIC BLOOD PRESSURE: 117 MMHG | RESPIRATION RATE: 16 BRPM | HEART RATE: 83 BPM | HEIGHT: 74 IN | BODY MASS INDEX: 28.23 KG/M2 | OXYGEN SATURATION: 99 % | WEIGHT: 220 LBS | DIASTOLIC BLOOD PRESSURE: 63 MMHG | TEMPERATURE: 96.6 F

## 2021-03-19 DIAGNOSIS — M25.462 EFFUSION OF LEFT KNEE: ICD-10-CM

## 2021-03-19 DIAGNOSIS — G89.29 CHRONIC PAIN OF LEFT KNEE: ICD-10-CM

## 2021-03-19 DIAGNOSIS — M25.562 CHRONIC PAIN OF LEFT KNEE: ICD-10-CM

## 2021-03-19 DIAGNOSIS — M17.12 PRIMARY OSTEOARTHRITIS OF LEFT KNEE: Primary | ICD-10-CM

## 2021-03-19 PROCEDURE — G8432 DEP SCR NOT DOC, RNG: HCPCS | Performed by: SPECIALIST

## 2021-03-19 PROCEDURE — 99213 OFFICE O/P EST LOW 20 MIN: CPT | Performed by: SPECIALIST

## 2021-03-19 PROCEDURE — G8417 CALC BMI ABV UP PARAM F/U: HCPCS | Performed by: SPECIALIST

## 2021-03-19 PROCEDURE — G8536 NO DOC ELDER MAL SCRN: HCPCS | Performed by: SPECIALIST

## 2021-03-19 PROCEDURE — 20610 DRAIN/INJ JOINT/BURSA W/O US: CPT | Performed by: SPECIALIST

## 2021-03-19 PROCEDURE — G8427 DOCREV CUR MEDS BY ELIG CLIN: HCPCS | Performed by: SPECIALIST

## 2021-03-19 PROCEDURE — G8752 SYS BP LESS 140: HCPCS | Performed by: SPECIALIST

## 2021-03-19 PROCEDURE — 1101F PT FALLS ASSESS-DOCD LE1/YR: CPT | Performed by: SPECIALIST

## 2021-03-19 PROCEDURE — G8754 DIAS BP LESS 90: HCPCS | Performed by: SPECIALIST

## 2021-03-19 NOTE — PATIENT INSTRUCTIONS
Knee Arthritis: Exercises  Introduction  Here are some examples of exercises for you to try. The exercises may be suggested for a condition or for rehabilitation. Start each exercise slowly. Ease off the exercises if you start to have pain. You will be told when to start these exercises and which ones will work best for you. How to do the exercises  Knee flexion with heel slide   1. Lie on your back with your knees bent. 2. Slide your heel back by bending your affected knee as far as you can. Then hook your other foot around your ankle to help pull your heel even farther back. 3. Hold for about 6 seconds, then rest for up to 10 seconds. 4. Repeat 8 to 12 times. 5. Switch legs and repeat steps 1 through 4, even if only one knee is sore. Quad sets   1. Sit with your affected leg straight and supported on the floor or a firm bed. Place a small, rolled-up towel under your knee. Your other leg should be bent, with that foot flat on the floor. 2. Tighten the thigh muscles of your affected leg by pressing the back of your knee down into the towel. 3. Hold for about 6 seconds, then rest for up to 10 seconds. 4. Repeat 8 to 12 times. 5. Switch legs and repeat steps 1 through 4, even if only one knee is sore. Straight-leg raises to the front   1. Lie on your back with your good knee bent so that your foot rests flat on the floor. Your affected leg should be straight. Make sure that your low back has a normal curve. You should be able to slip your hand in between the floor and the small of your back, with your palm touching the floor and your back touching the back of your hand. 2. Tighten the thigh muscles in your affected leg by pressing the back of your knee flat down to the floor. Hold your knee straight. 3. Keeping the thigh muscles tight and your leg straight, lift your affected leg up so that your heel is about 12 inches off the floor. Hold for about 6 seconds, then lower slowly.   4. Relax for up to 10 seconds between repetitions. 5. Repeat 8 to 12 times. 6. Switch legs and repeat steps 1 through 5, even if only one knee is sore. Active knee flexion   1. Lie on your stomach with your knees straight. If your kneecap is uncomfortable, roll up a washcloth and put it under your leg just above your kneecap. 2. Lift the foot of your affected leg by bending the knee so that you bring the foot up toward your buttock. If this motion hurts, try it without bending your knee quite as far. This may help you avoid any painful motion. 3. Slowly move your leg up and down. 4. Repeat 8 to 12 times. 5. Switch legs and repeat steps 1 through 4, even if only one knee is sore. Quadriceps stretch (facedown)   1. Lie flat on your stomach, and rest your face on the floor. 2. Wrap a towel or belt strap around the lower part of your affected leg. Then use the towel or belt strap to slowly pull your heel toward your buttock until you feel a stretch. 3. Hold for about 15 to 30 seconds, then relax your leg against the towel or belt strap. 4. Repeat 2 to 4 times. 5. Switch legs and repeat steps 1 through 4, even if only one knee is sore. Stationary exercise bike   1. If you do not have a stationary exercise bike at home, you can find one to ride at your local health club or community center. 2. Adjust the height of the bike seat so that your knee is slightly bent when your leg is extended downward. If your knee hurts when the pedal reaches the top, you can raise the seat so that your knee does not bend as much. 3. Start slowly. At first, try to do 5 to 10 minutes of cycling with little to no resistance. Then increase your time and the resistance bit by bit until you can do 20 to 30 minutes without pain. 4. If you start to have pain, rest your knee until your pain gets back to the level that is normal for you. Or cycle for less time or with less effort. Follow-up care is a key part of your treatment and safety.  Be sure to make and go to all appointments, and call your doctor if you are having problems. It's also a good idea to know your test results and keep a list of the medicines you take. Where can you learn more? Go to http://www.martins.com/  Enter C159 in the search box to learn more about \"Knee Arthritis: Exercises. \"  Current as of: March 2, 2020               Content Version: 12.6  © 2006-2020 [employer]. Care instructions adapted under license by [employer] (which disclaims liability or warranty for this information). If you have questions about a medical condition or this instruction, always ask your healthcare professional. [address] any warranty or liability for your use of this information.

## 2021-03-19 NOTE — PROGRESS NOTES
Patient: Leila Quick                MRN: [id number]       SSN: [id number]  YOB: 1940        AGE: 79 y.o. SEX: male    PCP: Laura Bah MD  03/19/21    CC: LEFT KNEE PAIN AND SWELLING, KNEE EFFUSION    HISTORY:  Leila Quick is a 79 y.o. male who was referred by Dr. Kaitlyn Miles for evaluation and treatment of  left knee pain and swelling. His cortisone injection helped last ov. He has been experiencing left knee pain off and on for the past three months. 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 He felt a snapping sensation when he supported the water pack on his left knee as he opened the screen door. His knee pain has worsened since that episode. He feels increased knee pain if he walks around his neighborhood for more than three days in a row. He feels pain with standing and stair climbing. He experiences startup pain after sitting. He denies a h/o of gout. Pain Assessment  3/19/2021   Location of Pain Knee   Location Modifiers Left   Severity of Pain 3   Quality of Pain Dull   Quality of Pain Comment feels like knee will give   Duration of Pain A few hours   Frequency of Pain Intermittent   Aggravating Factors Walking;Standing   Limiting Behavior -   Relieving Factors Rest   Result of Injury No     Occupation, etc:  Mr. Cecile Reina is retired. He retired from [employer] after 25 years. He also worked as a  for the [employer]. He lives alone in [place]. His wife passed away a year ago. He has a son in [place] and a son in [facility]. Both of his sons are retired . He has 2 granddaughters and 1 grandson. He is active in his Religious community and volunteers for friends and neighbors. He used to play [employer]. He has not been able to walk for exercise since his knee started hurting. Mr. Cecile Reina weighs 221 lbs and is 6'2\" tall. He is not diabetic.     No results found for: HBA1C, HGBE8, XOD3JXEG, UDO0YAEZ, MLB6DRXF  Weight Metrics 3/19/2021 2021 2021 10/12/2020 2020 2020 3/12/2020   Weight 220 lb 221 lb 3.2 oz 220 lb 217 lb 218 lb 218 lb 215 lb   BMI 28.25 kg/m2 28.4 kg/m2 28.25 kg/m2 27.86 kg/m2 27.99 kg/m2 27.99 kg/m2 27.6 kg/m2       Patient Active Problem List   Diagnosis Code    Atrial fibrillation (HCC) I48.91    Sick sinus syndrome (HCC) I49.5    Impotence of organic origin N52.9    Personal history of malignant neoplasm of prostate Z85.46    Elevated prostate specific antigen (PSA) R97.20    HTN (hypertension) I10    Dyslipidemia E78.5    Essential hypertension I10    ACP (advance care planning) Z71.89    Cardiac pacemaker in situ Z95.0     REVIEW OF SYSTEMS:    Constitutional Symptoms: Negative   Eyes: Negative   Ears, Nose, Throat and Mouth: Negative   Cardiovascular: Negative   Respiratory: Negative   Genitourinary: Per HPI   Gastrointestinal: Per HPI   Integumentary (Skin and/or Breast): Negative   Musculoskeletal: Per HPI   Endocrine/Rheumatologic: Negative   Neurological: Per HPI   Hematology/Lymphatic: Negative    Allergic/Immunologic: Negative   Phychiatric: Negative    Social History     Socioeconomic History    Marital status:      Spouse name: Not on file    Number of children: Not on file    Years of education: Not on file    Highest education level: Not on file   Occupational History    Not on file   Social Needs    Financial resource strain: Not on file    Food insecurity     Worry: Not on file     Inability: Not on file    Transportation needs     Medical: Not on file     Non-medical: Not on file   Tobacco Use    Smoking status: Former Smoker     Packs/day: 0.25     Years: 1.00     Pack years: 0.25     Quit date: 1981     Years since quittin.9    Smokeless tobacco: Never Used   Substance and Sexual Activity    Alcohol use: No    Drug use: No    Sexual activity: Not on file   Lifestyle    Physical activity     Days per week: Not on file     Minutes per session: Not on file    Stress: Not on file   Relationships    Social connections     Talks on phone: Not on file     Gets together: Not on file     Attends Spiritism service: Not on file     Active member of club or organization: Not on file     Attends meetings of clubs or organizations: Not on file     Relationship status: Not on file    Intimate partner violence     Fear of current or ex partner: Not on file     Emotionally abused: Not on file     Physically abused: Not on file     Forced sexual activity: Not on file   Other Topics Concern    Not on file   Social History Narrative    Not on file      Allergies   Allergen Reactions    Shellfish Containing Products Swelling and Other (comments)      Current Outpatient Medications   Medication Sig    multivitamin, tx-iron-ca-min (THERA-M w/ IRON) 9 mg iron-400 mcg tab tablet Take 1 Tab by mouth daily.  dilTIAZem ER (CARDIZEM CD) 240 mg capsule TAKE 1 CAPSULE TWICE A DAY    fish oil-omega-3 fatty acids 340-1,000 mg capsule Take 1,000 mg by mouth.  latanoprost (XALATAN) 0.005 % ophthalmic solution Administer 1 Drop to both eyes daily.  brimonidine (ALPHAGAN) 0.2 % ophthalmic solution Administer 1 Drop to both eyes two (2) times a day.  aspirin 81 mg tablet Take 2 Tabs by mouth daily.  ZINC MTH/COPPER/SAW PALM/GNSG (PROSTATE HEALTH FORMULA PO) Take 1 Tab by mouth daily. No current facility-administered medications for this visit.        PHYSICAL EXAMINATION:  Visit Vitals  /63 (BP 1 Location: Left upper arm, BP Patient Position: Sitting, BP Cuff Size: Large adult)   Pulse 83   Temp (!) 96.6 °F (35.9 °C) (Temporal)   Resp 16   Ht 6' 2\" (1.88 m)   Wt 220 lb (99.8 kg)   SpO2 99%   BMI 28.25 kg/m²      ORTHO EXAMINATION:  Examination Right knee Left knee   Skin Intact Intact   Range of motion 120-0 115-0   Effusion - ++   Medial joint line tenderness - +   Lateral joint line tenderness - -   Popliteal tenderness - - Osteophytes palpable - +   Stephanies - -   Patella crepitus - +   Anterior drawer - -   Lateral laxity - -   Medial laxity - -   Varus deformity - -   Valgus deformity - -   Pretibial edema - -   Calf tenderness - -        TIME OUT:  Chart reviewed for the following:   Lei Escobar MD, have reviewed the History, Physical and updated the Allergic reactions for [facility] performed immediately prior to start of procedure:  Lei Escobar MD, have performed the following reviews on [address] [place] [address] prior to the start of the procedure:          * Patient was identified by name and date of birth   * Agreement on procedure being performed was verified  * Risks and Benefits explained to the patient  * Procedure site verified and marked as necessary  * Patient was positioned for comfort  * Consent was obtained     Time: 8:42 AM     Date of procedure: 3/19/2021  Procedure performed by:  Mary Lou Aguirre MD  Mr. Lucinda Sanchez tolerated the procedure well with no complications. RADIOGRAPHS:  XR LEFT KNEE 2/5/21 DEDRICK  IMPRESSION:  Three views with bilateral knees on AP view - No fractures, no effusion, moderate lateral joint space narrowing, no osteophytes present. Kellgren Dean grade 2    IMPRESSION:      ICD-10-CM ICD-9-CM    1. Primary osteoarthritis of left knee  M17.12 715.16 sodium hyaluronate (SUPARTZ FX/EUFLEXXA/HYALGAN) 10 mg/mL injection syrg 20 mg      DRAIN/INJECT LARGE JOINT/BURSA   2. Chronic pain of left knee  M25.562 719.46 sodium hyaluronate (SUPARTZ FX/EUFLEXXA/HYALGAN) 10 mg/mL injection syrg 20 mg    G89.29 338.29 DRAIN/INJECT LARGE JOINT/BURSA   3. Effusion of left knee  M25.462 719.06      PLAN:   After timeout and under sterile conditions, left knee aspirated 30 cc of light yellow fluid. The fluid was discarded. Joint protective exercises were outlined today. After discussing treatment options, patient's left knee was injected with 2 cc Euflexxa.  There is no need for surgery at this time. He will follow up in 1 week for Euflexxa #2.     Scribed by Ashwin Melissa (Devi Tucker) as dictated by Kristin Hendrix MD

## 2021-03-22 ENCOUNTER — OFFICE VISIT (OUTPATIENT)
Dept: CARDIOLOGY CLINIC | Age: 81
End: 2021-03-22
Payer: MEDICARE

## 2021-03-22 VITALS
BODY MASS INDEX: 28.23 KG/M2 | HEIGHT: 74 IN | WEIGHT: 220 LBS | DIASTOLIC BLOOD PRESSURE: 78 MMHG | OXYGEN SATURATION: 98 % | HEART RATE: 98 BPM | SYSTOLIC BLOOD PRESSURE: 138 MMHG

## 2021-03-22 DIAGNOSIS — E78.5 DYSLIPIDEMIA: ICD-10-CM

## 2021-03-22 DIAGNOSIS — Z95.0 CARDIAC PACEMAKER IN SITU: ICD-10-CM

## 2021-03-22 DIAGNOSIS — I48.0 PAROXYSMAL ATRIAL FIBRILLATION (HCC): Primary | ICD-10-CM

## 2021-03-22 DIAGNOSIS — I10 ESSENTIAL HYPERTENSION: ICD-10-CM

## 2021-03-22 DIAGNOSIS — I49.5 SICK SINUS SYNDROME (HCC): ICD-10-CM

## 2021-03-22 PROCEDURE — G8754 DIAS BP LESS 90: HCPCS | Performed by: INTERNAL MEDICINE

## 2021-03-22 PROCEDURE — G8752 SYS BP LESS 140: HCPCS | Performed by: INTERNAL MEDICINE

## 2021-03-22 PROCEDURE — G8536 NO DOC ELDER MAL SCRN: HCPCS | Performed by: INTERNAL MEDICINE

## 2021-03-22 PROCEDURE — 99214 OFFICE O/P EST MOD 30 MIN: CPT | Performed by: INTERNAL MEDICINE

## 2021-03-22 PROCEDURE — G8510 SCR DEP NEG, NO PLAN REQD: HCPCS | Performed by: INTERNAL MEDICINE

## 2021-03-22 PROCEDURE — 93280 PM DEVICE PROGR EVAL DUAL: CPT | Performed by: INTERNAL MEDICINE

## 2021-03-22 PROCEDURE — 1101F PT FALLS ASSESS-DOCD LE1/YR: CPT | Performed by: INTERNAL MEDICINE

## 2021-03-22 PROCEDURE — G8417 CALC BMI ABV UP PARAM F/U: HCPCS | Performed by: INTERNAL MEDICINE

## 2021-03-22 PROCEDURE — G8427 DOCREV CUR MEDS BY ELIG CLIN: HCPCS | Performed by: INTERNAL MEDICINE

## 2021-03-22 NOTE — PROGRESS NOTES
Vivi Cordero presents today for No chief complaint on file. Vivi Cordero preferred language for health care discussion is english/other. Is someone accompanying this pt? no    Is the patient using any DME equipment during [address]? no    Depression Screening:  3 most recent PHQ Screens 3/22/2021   Little interest or pleasure in doing things Not at all   Feeling down, depressed, irritable, or hopeless Not at all   Total Score PHQ 2 0       Learning Assessment:  Learning Assessment 8/15/2019   PRIMARY LEARNER Patient   HIGHEST LEVEL OF EDUCATION - PRIMARY LEARNER  -   BARRIERS PRIMARY LEARNER -   CO-LEARNER CAREGIVER -   PRIMARY LANGUAGE ENGLISH   LEARNER PREFERENCE PRIMARY DEMONSTRATION     -   ANSWERED BY Patient   RELATIONSHIP SELF       Abuse Screening:  Abuse Screening Questionnaire 3/22/2021   Do you ever feel afraid of your partner? N   Are you in a relationship with someone who physically or mentally threatens you? N   Is it safe for you to go home? Y       Fall Risk  Fall Risk Assessment, last 12 mths 3/22/2021   Able to walk? Yes   Fall in past 12 months? 0   Do you feel unsteady? 0   Are you worried about falling 0   Number of falls in past 12 months -   Fall with injury? -       Pt currently taking Anticoagulant therapy? no    Coordination of Care:  1. Have you been to the ER, urgent care clinic since your last visit? Hospitalized since your last visit? no    2. Have you seen or consulted any other health care providers outside of the [address] [place] [address] since your last visit? Include any pap smears or colon screening.  no

## 2021-03-22 NOTE — PROGRESS NOTES
HISTORY OF PRESENT ILLNESS  Deneen Ariza is a 79 y.o. male. Follow-up  Pertinent negatives include no chest pain, no abdominal pain, no headaches and no shortness of breath. Leg Swelling  Pertinent negatives include no chest pain, no abdominal pain, no headaches and no shortness of breath. Patient presents for a followup visit. He has a past medical history significant for paroxysmal atrial fibrillation, hypertension, and sick sinus syndrome, status post dual-chamber permanent pacemaker in March 2011. He underwent an exercise nuclear stress test in February 2019 which was a normal low risk study. He exercised 8 minutes on the treadmill using the Raghu protocol without any EKG changes or symptoms. No ischemia. EF 72%. He was last seen in our office 6 months ago. Since last visit, he states he has been feeling well. He denies any heart palpitations, dizziness or syncope. No chest pain or shortness of breath. He continues to have swelling of his left leg greater than his right leg which is usually worse at the end of the day. This has been present ever since he had issues with his left knee. This has not changed in severity or frequency. Past Medical History:   Diagnosis Date    Atrial fibrillation (HCC)     CHADS 0  (-CHF, -HTN, -AGE, -DM, -CVA)    Cardiac echocardiogram 06/29/2010    EF 70-75%. Mild conc LVh. Gr 1 DDfx. Mild DELMY.  Cardiac Holter monitor, normal 06/28/2010    Normal Holter study.  Cardiac nuclear imaging test 11/22/2010    Sm area of apical ischemia and inferolateral scar, both possibly apical thinning. No WMA. EF 60%.  Impotence of organic origin     Pacemaker     3/11  MEDTRONIC    Personal history of malignant neoplasm of prostate     T1a, [place] 6 (3+3)     Prostate cancer ([facility] [address]     PUD (peptic ulcer disease)     Sick sinus syndrome ([facility] [address]     status post implantation of Medtronic dual-chamber permanent pacemaker 3/22/11.     Unspecified essential hypertension     Venereal disease       Current Outpatient Medications   Medication Sig Dispense Refill    multivitamin, tx-iron-ca-min (THERA-M w/ IRON) 9 mg iron-400 mcg tab tablet Take 1 Tab by mouth daily.  dilTIAZem ER (CARDIZEM CD) 240 mg capsule TAKE 1 CAPSULE TWICE A  Cap 3    fish oil-omega-3 fatty acids 340-1,000 mg capsule Take 1,000 mg by mouth.  latanoprost (XALATAN) 0.005 % ophthalmic solution Administer 1 Drop to both eyes daily.  brimonidine (ALPHAGAN) 0.2 % ophthalmic solution Administer 1 Drop to both eyes two (2) times a day.  aspirin 81 mg tablet Take 2 Tabs by mouth daily. 1 Tab 0    ZINC MTH/COPPER/SAW PALM/GNSG (PROSTATE HEALTH FORMULA PO) Take 1 Tab by mouth daily. Allergies   Allergen Reactions    Shellfish Containing Products Swelling and Other (comments)      Social History     Tobacco Use    Smoking status: Former Smoker     Packs/day: 0.25     Years: 1.00     Pack years: 0.25     Quit date: 1981     Years since quittin.9    Smokeless tobacco: Never Used   Substance Use Topics    Alcohol use: No    Drug use: No         Review of Systems   Constitutional: Negative for chills, fever and weight loss. HENT: Negative for nosebleeds. Eyes: Negative for blurred vision and double vision. Respiratory: Negative for cough, shortness of breath and wheezing. Cardiovascular: Positive for leg swelling. Negative for chest pain, palpitations, orthopnea, claudication and PND. Gastrointestinal: Negative for abdominal pain, heartburn, nausea and vomiting. Genitourinary: Negative for dysuria and hematuria. Musculoskeletal: Negative for falls and myalgias. Skin: Negative for rash. Neurological: Negative for dizziness, focal weakness and headaches. Endo/Heme/Allergies: Does not bruise/bleed easily. Psychiatric/Behavioral: Negative for substance abuse.      Visit Vitals  /78 (BP 1 Location: Right upper arm, BP Patient Position: Sitting, BP Cuff Size: Adult)   Pulse 98   Ht 6' 2\" (1.88 m)   Wt 99.8 kg (220 lb)   SpO2 98%   BMI 28.25 kg/m²      Physical Exam   Constitutional: He is oriented to person, place, and time. He appears well-developed and well-nourished. HENT:   Head: Normocephalic and atraumatic. Eyes: Conjunctivae are normal.   Neck: Neck supple. No JVD present. Carotid bruit is not present. Cardiovascular: Normal rate, regular rhythm, S1 normal, S2 normal and normal pulses. Exam reveals no gallop and no S3. No murmur heard. Pulmonary/Chest: Breath sounds normal. He has no wheezes. He has no rales. Abdominal: Soft. Bowel sounds are normal. There is no abdominal tenderness. Musculoskeletal:         General: Edema ( Trace to 1+ left lower extremity) present. Neurological: He is alert and oriented to person, place, and time. Skin: Skin is warm and dry. Pacemaker interrogation. Battery life estimated at 21 months. Single episode of atrial fibrillation on January 10 of this year lasting for 10 hours. Controlled ventricular rate in the 70s. Pacing in the atrium 98% in the ventricle 0%. Scanned document for details. ASSESSMENT and PLAN    Paroxysmal atrial fibrillation. Patient had one episode in January of this year lasting for about 10 hours with controlled ventricular rates. He was asymptomatic at that time. His last episode of atrial fibrillation prior to that was in June 2019. For now I recommend continuing aspirin for CVA prophylaxis and diltiazem. If he does have more frequent episodes on a home device check, I have a low threshold to start him on a direct oral anticoagulant. Sick sinus syndrome. Status post dual-chamber permanent pacemaker. Normal device function on interrogation. The patient continues to pace the majority of the time in the atrium and 0% in the ventricle. Battery life estimated at 21 months. Essential hypertension.   Patient blood pressure is well controlled on diltiazem as monotherapy. Dyslipidemia. This is only been mildly elevated in the past.  This is followed closely by his PCP. The patient works on lifestyle modification. [facility] device check in 3 months.   Follow-up in 6 months, sooner if needed

## 2021-03-24 ENCOUNTER — TELEPHONE (OUTPATIENT)
Dept: ORTHOPEDIC SURGERY | Age: 81
End: 2021-03-24

## 2021-03-24 NOTE — TELEPHONE ENCOUNTER
Patient is requesting a print out of exercises he can do at home for his knee. He would like to retreive these via my chart if that is possible. If not, let him know and he will  at the   when available.      Patient [phone]

## 2021-03-25 NOTE — TELEPHONE ENCOUNTER
Discussed with Dr Bessy Emanuel coming in tomorrow for 2nd euflexxa--will print out then--pt informed

## 2021-03-26 ENCOUNTER — OFFICE VISIT (OUTPATIENT)
Dept: ORTHOPEDIC SURGERY | Age: 81
End: 2021-03-26
Payer: MEDICARE

## 2021-03-26 VITALS
BODY MASS INDEX: 27.95 KG/M2 | TEMPERATURE: 97.1 F | OXYGEN SATURATION: 99 % | HEIGHT: 74 IN | WEIGHT: 217.8 LBS | HEART RATE: 62 BPM

## 2021-03-26 DIAGNOSIS — M17.12 PRIMARY OSTEOARTHRITIS OF LEFT KNEE: Primary | ICD-10-CM

## 2021-03-26 DIAGNOSIS — M25.562 CHRONIC PAIN OF LEFT KNEE: ICD-10-CM

## 2021-03-26 DIAGNOSIS — G89.29 CHRONIC PAIN OF LEFT KNEE: ICD-10-CM

## 2021-03-26 PROCEDURE — 20610 DRAIN/INJ JOINT/BURSA W/O US: CPT | Performed by: SPECIALIST

## 2021-03-26 NOTE — PROGRESS NOTES
Patient: Sonali Valenzuela                MRN: [id number]       SSN: [id number]  YOB: 1940        AGE: 79 y.o. SEX: male  Body mass index is 27.96 kg/m². PCP: Arsenio Cain MD  03/26/21    Chief Complaint   Patient presents with    Knee Pain     left knee     HISTORY:  Sonali Valenzuela is a 79 y.o. male who is seen for left knee pain. ICD-10-CM ICD-9-CM    1. Primary osteoarthritis of left knee  M17.12 715.16 sodium hyaluronate (SUPARTZ FX/EUFLEXXA/HYALGAN) 10 mg/mL injection syrg 20 mg      DRAIN/INJECT LARGE JOINT/BURSA   2. Chronic pain of left knee  M25.562 719.46     G89.29 338.29        Chart reviewed for the following:   Chani Castro MD, have reviewed the History, Physical and updated the Allergic reactions for [address] [place] [address] performed immediately prior to start of procedure:  Chani Castro MD, have performed the following reviews on Sonali Valenzuela prior to the start of the procedure:            * Patient was identified by name and date of birth   * Agreement on procedure being performed was verified  * Risks and Benefits explained to the patient  * Procedure site verified and marked as necessary  * Patient was positioned for comfort  * Consent was obtained     Time: 8:35 AM     Date of procedure: 3/26/2021    Procedure performed by:  Essence Turner MD    Mr. Chuck Sprague tolerated the procedure well with no complications. PLAN:  Joint protective exercises were outlined today. After discussing treatment options, patient's left kne was injected with 2 cc of Euflexxa. Mr. Chuck Sprague will follow up in one week to complete his visco supplementation injection series.       Scribed by Gen Kilpatrick ([address]) as dictated by Essence Turner MD

## 2021-03-26 NOTE — PATIENT INSTRUCTIONS
Knee Arthritis: Exercises  Introduction  Here are some examples of exercises for you to try. The exercises may be suggested for a condition or for rehabilitation. Start each exercise slowly. Ease off the exercises if you start to have pain. You will be told when to start these exercises and which ones will work best for you. How to do the exercises  Knee flexion with heel slide   1. Lie on your back with your knees bent. 2. Slide your heel back by bending your affected knee as far as you can. Then hook your other foot around your ankle to help pull your heel even farther back. 3. Hold for about 6 seconds, then rest for up to 10 seconds. 4. Repeat 8 to 12 times. 5. Switch legs and repeat steps 1 through 4, even if only one knee is sore. Quad sets   1. Sit with your affected leg straight and supported on the floor or a firm bed. Place a small, rolled-up towel under your knee. Your other leg should be bent, with that foot flat on the floor. 2. Tighten the thigh muscles of your affected leg by pressing the back of your knee down into the towel. 3. Hold for about 6 seconds, then rest for up to 10 seconds. 4. Repeat 8 to 12 times. 5. Switch legs and repeat steps 1 through 4, even if only one knee is sore. Straight-leg raises to the front   1. Lie on your back with your good knee bent so that your foot rests flat on the floor. Your affected leg should be straight. Make sure that your low back has a normal curve. You should be able to slip your hand in between the floor and the small of your back, with your palm touching the floor and your back touching the back of your hand. 2. Tighten the thigh muscles in your affected leg by pressing the back of your knee flat down to the floor. Hold your knee straight. 3. Keeping the thigh muscles tight and your leg straight, lift your affected leg up so that your heel is about 12 inches off the floor. Hold for about 6 seconds, then lower slowly.   4. Relax for up to 10 seconds between repetitions. 5. Repeat 8 to 12 times. 6. Switch legs and repeat steps 1 through 5, even if only one knee is sore. Active knee flexion   1. Lie on your stomach with your knees straight. If your kneecap is uncomfortable, roll up a washcloth and put it under your leg just above your kneecap. 2. Lift the foot of your affected leg by bending the knee so that you bring the foot up toward your buttock. If this motion hurts, try it without bending your knee quite as far. This may help you avoid any painful motion. 3. Slowly move your leg up and down. 4. Repeat 8 to 12 times. 5. Switch legs and repeat steps 1 through 4, even if only one knee is sore. Quadriceps stretch (facedown)   1. Lie flat on your stomach, and rest your face on the floor. 2. Wrap a towel or belt strap around the lower part of your affected leg. Then use the towel or belt strap to slowly pull your heel toward your buttock until you feel a stretch. 3. Hold for about 15 to 30 seconds, then relax your leg against the towel or belt strap. 4. Repeat 2 to 4 times. 5. Switch legs and repeat steps 1 through 4, even if only one knee is sore. Stationary exercise bike   1. If you do not have a stationary exercise bike at home, you can find one to ride at your local health club or community center. 2. Adjust the height of the bike seat so that your knee is slightly bent when your leg is extended downward. If your knee hurts when the pedal reaches the top, you can raise the seat so that your knee does not bend as much. 3. Start slowly. At first, try to do 5 to 10 minutes of cycling with little to no resistance. Then increase your time and the resistance bit by bit until you can do 20 to 30 minutes without pain. 4. If you start to have pain, rest your knee until your pain gets back to the level that is normal for you. Or cycle for less time or with less effort. Follow-up care is a key part of your treatment and safety.  Be sure to make and go to all appointments, and call your doctor if you are having problems. It's also a good idea to know your test results and keep a list of the medicines you take. Where can you learn more? Go to http://www.martins.com/  Enter C159 in the search box to learn more about \"Knee Arthritis: Exercises. \"  Current as of: March 2, 2020               Content Version: 12.6  © 2006-2020 [employer]. Care instructions adapted under license by [employer] (which disclaims liability or warranty for this information). If you have questions about a medical condition or this instruction, always ask your healthcare professional. [address] any warranty or liability for your use of this information. Knee: Exercises  Introduction  Here are some examples of exercises for you to try. The exercises may be suggested for a condition or for rehabilitation. Start each exercise slowly. Ease off the exercises if you start to have pain. You will be told when to start these exercises and which ones will work best for you. How to do the exercises  Quad sets   1. Sit with your leg straight and supported on the floor or a firm bed. (If you feel discomfort in the front or back of your knee, place a small towel roll under your knee.)  2. Tighten the muscles on top of your thigh by pressing the back of your knee flat down to the floor. (If you feel discomfort under your kneecap, place a small towel roll under your knee.)  3. Hold for about 6 seconds, then rest for up to 10 seconds. 4. Do 8 to 12 repetitions several times a day. Straight-leg raises to the front   1. Lie on your back with your good knee bent so that your foot rests flat on the floor. Your injured leg should be straight. Make sure that your low back has a normal curve.  You should be able to slip your flat hand in between the floor and the small of your back, with your palm touching the floor and your back touching the back of your hand. 2. Tighten the thigh muscles in the injured leg by pressing the back of your knee flat down to the floor. Hold your knee straight. 3. Keeping the thigh muscles tight, lift your injured leg up so that your heel is about 12 inches off the floor. Hold for about 6 seconds and then lower slowly. 4. Do 8 to 12 repetitions, 3 times a day. Straight-leg raises to the outside   1. Lie on your side, with your injured leg on top. 2. Tighten the front thigh muscles of your injured leg to keep your knee straight. 3. Keep your hip and your leg straight in line with the rest of your body, and keep your knee pointing forward. Do not drop your hip back. 4. Lift your injured leg straight up toward the ceiling, about 12 inches off the floor. Hold for about 6 seconds, then slowly lower your leg. 5. Do 8 to 12 repetitions. Straight-leg raises to the back   1. Lie on your stomach, and lift your leg straight up behind you (toward the ceiling). 2. Lift your toes about 6 inches off the floor, hold for about 6 seconds, then lower slowly. 3. Do 8 to 12 repetitions. Straight-leg raises to the inside   1. Lie on the side of your body with the injured leg. 2. You can either prop your other (good) leg up on a chair, or you can bend your good knee and put that foot in front of your injured knee. Do not drop your hip back. 3. Tighten the muscles on the front of your thigh to straighten your injured knee. 4. Keep your kneecap pointing forward, and lift your whole leg up toward the ceiling about 6 inches. Hold for about 6 seconds, then lower slowly. 5. Do 8 to 12 repetitions. Heel dig bridging   1. Lie on your back with both knees bent and your ankles bent so that only your heels are digging into the floor. Your knees should be bent about 90 degrees.   2. Then push your heels into the floor, squeeze your buttocks, and lift your hips off the floor until your shoulders, hips, and knees are all in a straight line. 3. Hold for about 6 seconds as you continue to breathe normally, and then slowly lower your hips back down to the floor and rest for up to 10 seconds. 4. Do 8 to 12 repetitions. Hamstring curls   1. Lie on your stomach with your knees straight. If your kneecap is uncomfortable, roll up a washcloth and put it under your leg just above your kneecap. 2. Lift the foot of your injured leg by bending the knee so that you bring the foot up toward your buttock. If this motion hurts, try it without bending your knee quite as far. This may help you avoid any painful motion. 3. Slowly lower your leg back to the floor. 4. Do 8 to 12 repetitions. 5. With permission from your doctor or physical therapist, you may also want to add a cuff weight to your ankle (not more than 5 pounds). With weight, you do not have to lift your leg more than 12 inches to get a hamstring workout. Shallow standing knee bends   Do this exercise only if you have very little pain; if you have no clicking, locking, or giving way if you have an injured knee; and if it does not hurt while you are doing 8 to 12 repetitions. 1. Stand with your hands lightly resting on a counter or chair in front of you. Put your feet shoulder-width apart. 2. Slowly bend your knees so that you squat down like you are going to sit in a chair. Make sure your knees do not go in front of your toes. 3. Lower yourself about 6 inches. Your heels should remain on the floor at all times. 4. Rise slowly to a standing position. Heel raises   1. Stand with your feet a few inches apart, with your hands lightly resting on a counter or chair in front of you. 2. Slowly raise your heels off the floor while keeping your knees straight. 3. Hold for about 6 seconds, then slowly lower your heels to the floor. 4. Do 8 to 12 repetitions several times during the day. Follow-up care is a key part of your treatment and safety.  Be sure to make and go to all appointments, and call your doctor if you are having problems. It's also a good idea to know your test results and keep a list of the medicines you take. Where can you learn more? Go to http://www.gray.com/  Enter G271 in the search box to learn more about \"Knee: Exercises. \"  Current as of: March 2, 2020               Content Version: 12.6  © 2006-2020 [employer]. Care instructions adapted under license by [employer] (which disclaims liability or warranty for this information). If you have questions about a medical condition or this instruction, always ask your healthcare professional. [address] any warranty or liability for your use of this information.

## 2021-04-02 ENCOUNTER — OFFICE VISIT (OUTPATIENT)
Dept: ORTHOPEDIC SURGERY | Age: 81
End: 2021-04-02
Payer: MEDICARE

## 2021-04-02 VITALS
RESPIRATION RATE: 16 BRPM | OXYGEN SATURATION: 100 % | HEART RATE: 70 BPM | TEMPERATURE: 96.8 F | BODY MASS INDEX: 28.49 KG/M2 | HEIGHT: 74 IN | WEIGHT: 222 LBS

## 2021-04-02 DIAGNOSIS — M25.562 CHRONIC PAIN OF LEFT KNEE: ICD-10-CM

## 2021-04-02 DIAGNOSIS — M17.12 PRIMARY OSTEOARTHRITIS OF LEFT KNEE: Primary | ICD-10-CM

## 2021-04-02 DIAGNOSIS — G89.29 CHRONIC PAIN OF LEFT KNEE: ICD-10-CM

## 2021-04-02 PROCEDURE — 20610 DRAIN/INJ JOINT/BURSA W/O US: CPT | Performed by: SPECIALIST

## 2021-04-02 NOTE — PROGRESS NOTES
Patient: Peyton Kirk                MRN: [id number]       SSN: [id number]  YOB: 1940        AGE: 79 y.o. SEX: male  Body mass index is 28.5 kg/m². PCP: Deysi Hernández MD  04/02/21    Chief Complaint   Patient presents with    Knee Pain     left     HISTORY:  Peyton Kirk is a 79 y.o. male who is seen for left knee pain. TIME OUT performed immediately prior to start of procedure:  Wily Marie MD, have performed the following reviews on Peyton Real prior to the start of the procedure:            * Patient was identified by name and date of birth   * Agreement on procedure being performed was verified  * Risks and Benefits explained to the patient  * Procedure site verified and marked as necessary  * Patient was positioned for comfort  * Consent was obtained     Time: 8:43 AM     Date of procedure: 4/2/2021    Procedure performed by:  Ina Woodward MD     [employer] tolerated the procedure well with no complications      [id number] ICD-9-CM    1. Primary osteoarthritis of left knee  M17.12 715.16 sodium hyaluronate (SUPARTZ FX/EUFLEXXA/HYALGAN) 10 mg/mL injection syrg 20 mg      DRAIN/INJECT LARGE JOINT/BURSA   2. Chronic pain of left knee  M25.562 719.46 sodium hyaluronate (SUPARTZ FX/EUFLEXXA/HYALGAN) 10 mg/mL injection syrg 20 mg    G89.29 338.29 DRAIN/INJECT LARGE JOINT/BURSA     PLAN:  After timeout and under sterile conditions, left knee aspirated 40 cc of light yellow fluid. The fluid was discarded. After discussing treatment options, patient's left knee was injected with 2 cc of Euflexxa.  [employer] will follow up PRN now that he has completed his visco supplementation injection series.       Scribed by Connie Whyte (Bella Carcamo) as dictated by Ina Woodward MD

## 2021-04-12 ENCOUNTER — HOSPITAL ENCOUNTER (INPATIENT)
Age: 81
LOS: 1 days | Discharge: HOME | DRG: 247 | End: 2021-04-13
Attending: EMERGENCY MEDICINE | Admitting: HOSPITALIST
Payer: MEDICARE

## 2021-04-12 DIAGNOSIS — R77.8 ELEVATED TROPONIN: Primary | ICD-10-CM

## 2021-04-12 DIAGNOSIS — I24.9 ACS (ACUTE CORONARY SYNDROME) (HCC): ICD-10-CM

## 2021-04-12 DIAGNOSIS — R07.9 CHEST PAIN, UNSPECIFIED TYPE: ICD-10-CM

## 2021-04-12 DIAGNOSIS — I21.4 NON-STEMI (NON-ST ELEVATED MYOCARDIAL INFARCTION) (HCC): ICD-10-CM

## 2021-04-12 PROBLEM — I25.10 CAD (CORONARY ARTERY DISEASE): Status: ACTIVE | Noted: 2021-04-12

## 2021-04-12 LAB
ANION GAP SERPL CALC-SCNC: 6 MMOL/L (ref 3–18)
APTT PPP: 36.2 SEC (ref 23–36.4)
ATRIAL RATE: 64 BPM
ATRIAL RATE: 72 BPM
BUN SERPL-MCNC: 17 MG/DL (ref 7–18)
BUN/CREAT SERPL: 15 (ref 12–20)
CALCIUM SERPL-MCNC: 9.1 MG/DL (ref 8.5–10.1)
CALCULATED P AXIS, ECG09: 39 DEGREES
CALCULATED P AXIS, ECG09: 47 DEGREES
CALCULATED R AXIS, ECG10: -32 DEGREES
CALCULATED R AXIS, ECG10: -4 DEGREES
CALCULATED T AXIS, ECG11: 26 DEGREES
CALCULATED T AXIS, ECG11: 27 DEGREES
CHLORIDE SERPL-SCNC: 109 MMOL/L (ref 100–111)
CO2 SERPL-SCNC: 26 MMOL/L (ref 21–32)
CREAT SERPL-MCNC: 1.16 MG/DL (ref 0.6–1.3)
DIAGNOSIS, 93000: NORMAL
DIAGNOSIS, 93000: NORMAL
GLUCOSE SERPL-MCNC: 172 MG/DL (ref 74–99)
MAGNESIUM SERPL-MCNC: 2.1 MG/DL (ref 1.6–2.6)
P-R INTERVAL, ECG05: 130 MS
P-R INTERVAL, ECG05: 146 MS
POTASSIUM SERPL-SCNC: 4 MMOL/L (ref 3.5–5.5)
Q-T INTERVAL, ECG07: 392 MS
Q-T INTERVAL, ECG07: 418 MS
QRS DURATION, ECG06: 92 MS
QRS DURATION, ECG06: 92 MS
QTC CALCULATION (BEZET), ECG08: 429 MS
QTC CALCULATION (BEZET), ECG08: 431 MS
SODIUM SERPL-SCNC: 141 MMOL/L (ref 136–145)
TROPONIN I SERPL-MCNC: 0.14 NG/ML (ref 0–0.04)
VENTRICULAR RATE, ECG03: 64 BPM
VENTRICULAR RATE, ECG03: 72 BPM

## 2021-04-12 PROCEDURE — 77030012468 HC VLV BLEEDBK CNTRL ABBT -B: Performed by: INTERNAL MEDICINE

## 2021-04-12 PROCEDURE — 92929 PR PRQ TRLUML CORONARY STENT W/ANGIO ADDL ART/BRNCH: CPT | Performed by: INTERNAL MEDICINE

## 2021-04-12 PROCEDURE — 99218 HC RM OBSERVATION: CPT

## 2021-04-12 PROCEDURE — 83735 ASSAY OF MAGNESIUM: CPT

## 2021-04-12 PROCEDURE — C1887 CATHETER, GUIDING: HCPCS | Performed by: INTERNAL MEDICINE

## 2021-04-12 PROCEDURE — C1725 CATH, TRANSLUMIN NON-LASER: HCPCS | Performed by: INTERNAL MEDICINE

## 2021-04-12 PROCEDURE — 027035Z DILATION OF CORONARY ARTERY, ONE ARTERY WITH TWO DRUG-ELUTING INTRALUMINAL DEVICES, PERCUTANEOUS APPROACH: ICD-10-PCS | Performed by: INTERNAL MEDICINE

## 2021-04-12 PROCEDURE — 85025 COMPLETE CBC W/AUTO DIFF WBC: CPT

## 2021-04-12 PROCEDURE — 85730 THROMBOPLASTIN TIME PARTIAL: CPT

## 2021-04-12 PROCEDURE — 77030015766: Performed by: INTERNAL MEDICINE

## 2021-04-12 PROCEDURE — 99152 MOD SED SAME PHYS/QHP 5/>YRS: CPT | Performed by: INTERNAL MEDICINE

## 2021-04-12 PROCEDURE — C1874 STENT, COATED/COV W/DEL SYS: HCPCS | Performed by: INTERNAL MEDICINE

## 2021-04-12 PROCEDURE — 77030027845 HC BND COM RDL D-STAT TELE -B: Performed by: INTERNAL MEDICINE

## 2021-04-12 PROCEDURE — 93005 ELECTROCARDIOGRAM TRACING: CPT

## 2021-04-12 PROCEDURE — 74011250636 HC RX REV CODE- 250/636: Performed by: EMERGENCY MEDICINE

## 2021-04-12 PROCEDURE — 74011250637 HC RX REV CODE- 250/637: Performed by: INTERNAL MEDICINE

## 2021-04-12 PROCEDURE — B2111ZZ FLUOROSCOPY OF MULTIPLE CORONARY ARTERIES USING LOW OSMOLAR CONTRAST: ICD-10-PCS | Performed by: INTERNAL MEDICINE

## 2021-04-12 PROCEDURE — 74011250636 HC RX REV CODE- 250/636: Performed by: PHYSICIAN ASSISTANT

## 2021-04-12 PROCEDURE — 84484 ASSAY OF TROPONIN QUANT: CPT

## 2021-04-12 PROCEDURE — 77030013797 HC KT TRNSDUC PRSSR EDWD -A: Performed by: INTERNAL MEDICINE

## 2021-04-12 PROCEDURE — C1769 GUIDE WIRE: HCPCS | Performed by: INTERNAL MEDICINE

## 2021-04-12 PROCEDURE — 99285 EMERGENCY DEPT VISIT HI MDM: CPT

## 2021-04-12 PROCEDURE — 74011000258 HC RX REV CODE- 258: Performed by: INTERNAL MEDICINE

## 2021-04-12 PROCEDURE — C1876 STENT, NON-COA/NON-COV W/DEL: HCPCS | Performed by: INTERNAL MEDICINE

## 2021-04-12 PROCEDURE — 99153 MOD SED SAME PHYS/QHP EA: CPT | Performed by: INTERNAL MEDICINE

## 2021-04-12 PROCEDURE — 93458 L HRT ARTERY/VENTRICLE ANGIO: CPT | Performed by: INTERNAL MEDICINE

## 2021-04-12 PROCEDURE — 77030013744: Performed by: INTERNAL MEDICINE

## 2021-04-12 PROCEDURE — 4A023N7 MEASUREMENT OF CARDIAC SAMPLING AND PRESSURE, LEFT HEART, PERCUTANEOUS APPROACH: ICD-10-PCS | Performed by: INTERNAL MEDICINE

## 2021-04-12 PROCEDURE — B2151ZZ FLUOROSCOPY OF LEFT HEART USING LOW OSMOLAR CONTRAST: ICD-10-PCS | Performed by: INTERNAL MEDICINE

## 2021-04-12 PROCEDURE — 99223 1ST HOSP IP/OBS HIGH 75: CPT | Performed by: INTERNAL MEDICINE

## 2021-04-12 PROCEDURE — 74011250636 HC RX REV CODE- 250/636: Performed by: INTERNAL MEDICINE

## 2021-04-12 PROCEDURE — 92928 PRQ TCAT PLMT NTRAC ST 1 LES: CPT | Performed by: INTERNAL MEDICINE

## 2021-04-12 PROCEDURE — 74011000250 HC RX REV CODE- 250: Performed by: INTERNAL MEDICINE

## 2021-04-12 PROCEDURE — 74011000636 HC RX REV CODE- 636: Performed by: INTERNAL MEDICINE

## 2021-04-12 PROCEDURE — 77030013519 HC DEV INFL BASIX MRTM -B: Performed by: INTERNAL MEDICINE

## 2021-04-12 PROCEDURE — 80048 BASIC METABOLIC PNL TOTAL CA: CPT

## 2021-04-12 DEVICE — XIENCE SIERRA™ EVEROLIMUS ELUTING CORONARY STENT SYSTEM 3.00 MM X 28 MM / RAPID-EXCHANGE
Type: IMPLANTABLE DEVICE | Status: FUNCTIONAL
Brand: XIENCE SIERRA™

## 2021-04-12 DEVICE — XIENCE SIERRA™ EVEROLIMUS ELUTING CORONARY STENT SYSTEM 3.50 MM X 15 MM / RAPID-EXCHANGE
Type: IMPLANTABLE DEVICE | Status: FUNCTIONAL
Brand: XIENCE SIERRA™

## 2021-04-12 RX ORDER — ATORVASTATIN CALCIUM 20 MG/1
20 TABLET, FILM COATED ORAL
Status: DISCONTINUED | OUTPATIENT
Start: 2021-04-12 | End: 2021-04-13 | Stop reason: HOSPADM

## 2021-04-12 RX ORDER — HEPARIN SODIUM 10000 [USP'U]/100ML
10-25 INJECTION, SOLUTION INTRAVENOUS
Status: DISCONTINUED | OUTPATIENT
Start: 2021-04-12 | End: 2021-04-12

## 2021-04-12 RX ORDER — DIPHENHYDRAMINE HYDROCHLORIDE 50 MG/ML
25 INJECTION, SOLUTION INTRAMUSCULAR; INTRAVENOUS ONCE
Status: COMPLETED | OUTPATIENT
Start: 2021-04-12 | End: 2021-04-12

## 2021-04-12 RX ORDER — MIDAZOLAM HYDROCHLORIDE 1 MG/ML
INJECTION, SOLUTION INTRAMUSCULAR; INTRAVENOUS AS NEEDED
Status: DISCONTINUED | OUTPATIENT
Start: 2021-04-12 | End: 2021-04-12 | Stop reason: HOSPADM

## 2021-04-12 RX ORDER — FENTANYL CITRATE 50 UG/ML
INJECTION, SOLUTION INTRAMUSCULAR; INTRAVENOUS AS NEEDED
Status: DISCONTINUED | OUTPATIENT
Start: 2021-04-12 | End: 2021-04-12 | Stop reason: HOSPADM

## 2021-04-12 RX ORDER — SODIUM CHLORIDE 450 MG/100ML
300 INJECTION, SOLUTION INTRAVENOUS CONTINUOUS
Status: DISPENSED | OUTPATIENT
Start: 2021-04-12 | End: 2021-04-12

## 2021-04-12 RX ORDER — HEPARIN SODIUM 1000 [USP'U]/ML
INJECTION, SOLUTION INTRAVENOUS; SUBCUTANEOUS AS NEEDED
Status: DISCONTINUED | OUTPATIENT
Start: 2021-04-12 | End: 2021-04-12 | Stop reason: HOSPADM

## 2021-04-12 RX ORDER — NITROGLYCERIN 400 UG/1
1 SPRAY ORAL
Status: DISCONTINUED | OUTPATIENT
Start: 2021-04-12 | End: 2021-04-13 | Stop reason: HOSPADM

## 2021-04-12 RX ORDER — ASPIRIN 81 MG/1
81 TABLET ORAL DAILY
Status: DISCONTINUED | OUTPATIENT
Start: 2021-04-13 | End: 2021-04-13 | Stop reason: HOSPADM

## 2021-04-12 RX ORDER — SODIUM CHLORIDE 0.9 % (FLUSH) 0.9 %
5-40 SYRINGE (ML) INJECTION AS NEEDED
Status: DISCONTINUED | OUTPATIENT
Start: 2021-04-12 | End: 2021-04-13 | Stop reason: HOSPADM

## 2021-04-12 RX ORDER — BIVALIRUDIN 250 MG/5ML
INJECTION, POWDER, LYOPHILIZED, FOR SOLUTION INTRAVENOUS AS NEEDED
Status: DISCONTINUED | OUTPATIENT
Start: 2021-04-12 | End: 2021-04-12 | Stop reason: HOSPADM

## 2021-04-12 RX ORDER — METOPROLOL SUCCINATE 50 MG/1
50 TABLET, EXTENDED RELEASE ORAL DAILY
Status: DISCONTINUED | OUTPATIENT
Start: 2021-04-12 | End: 2021-04-13 | Stop reason: HOSPADM

## 2021-04-12 RX ORDER — HEPARIN SODIUM 1000 [USP'U]/ML
4000 INJECTION, SOLUTION INTRAVENOUS; SUBCUTANEOUS ONCE
Status: COMPLETED | OUTPATIENT
Start: 2021-04-12 | End: 2021-04-12

## 2021-04-12 RX ORDER — VERAPAMIL HYDROCHLORIDE 2.5 MG/ML
INJECTION, SOLUTION INTRAVENOUS AS NEEDED
Status: DISCONTINUED | OUTPATIENT
Start: 2021-04-12 | End: 2021-04-12 | Stop reason: HOSPADM

## 2021-04-12 RX ORDER — SODIUM CHLORIDE 0.9 % (FLUSH) 0.9 %
5-40 SYRINGE (ML) INJECTION EVERY 8 HOURS
Status: DISCONTINUED | OUTPATIENT
Start: 2021-04-12 | End: 2021-04-13 | Stop reason: HOSPADM

## 2021-04-12 RX ORDER — LIDOCAINE HYDROCHLORIDE 10 MG/ML
INJECTION, SOLUTION EPIDURAL; INFILTRATION; INTRACAUDAL; PERINEURAL AS NEEDED
Status: DISCONTINUED | OUTPATIENT
Start: 2021-04-12 | End: 2021-04-12 | Stop reason: HOSPADM

## 2021-04-12 RX ADMIN — ATORVASTATIN CALCIUM 20 MG: 20 TABLET, FILM COATED ORAL at 21:07

## 2021-04-12 RX ADMIN — HEPARIN SODIUM 4000 UNITS: 1000 INJECTION INTRAVENOUS; SUBCUTANEOUS at 10:52

## 2021-04-12 RX ADMIN — SODIUM CHLORIDE 150 ML: 450 INJECTION, SOLUTION INTRAVENOUS at 16:41

## 2021-04-12 RX ADMIN — DIPHENHYDRAMINE HYDROCHLORIDE 25 MG: 50 INJECTION, SOLUTION INTRAMUSCULAR; INTRAVENOUS at 14:29

## 2021-04-12 RX ADMIN — HEPARIN SODIUM 10 UNITS/KG/HR: 10000 INJECTION, SOLUTION INTRAVENOUS at 10:54

## 2021-04-12 RX ADMIN — METOPROLOL SUCCINATE 50 MG: 50 TABLET, FILM COATED, EXTENDED RELEASE ORAL at 16:42

## 2021-04-12 RX ADMIN — Medication 10 ML: at 22:00

## 2021-04-12 RX ADMIN — METHYLPREDNISOLONE SODIUM SUCCINATE 125 MG: 125 INJECTION, POWDER, FOR SOLUTION INTRAMUSCULAR; INTRAVENOUS at 14:28

## 2021-04-12 NOTE — Clinical Note
TRANSFER - OUT REPORT:     Verbal report given to: TIFFANY Rodarte RN. Report consisted of patient's Situation, Background, Assessment and   Recommendations(SBAR). Opportunity for questions and clarification was provided. Patient transported with a Cardiac Cath Tech / Patient Care Tech. Patient transported to: holding area.

## 2021-04-12 NOTE — Clinical Note
Balloon inflated using multiple inflations inflation technique. Lesion #1: Pressure = 18 gina; Duration = 10 sec. Inflation 2: Pressure = 20 gina; Duration = 7 sec. Inflation 3: Pressure = 18 gina; Duration = 8 sec.

## 2021-04-12 NOTE — ED PROVIDER NOTES
HPI she says last evening he noticed some \"tightness\" in the middle of his chest before he went to bed. He said the symptoms lasted about 5 minutes and then resolved. This morning after he woke up he noticed a similar type episode of tightness for about 5 minutes. Patient denies symptoms of \"chest pain\" and says they feel \"kind of like indigestion\". He denies any shortness of breath. He denies any diaphoresis or nausea vomiting. Patient says he ate black-eyed peas last night and they typically give him indigestion and he thinks symptoms may be coming from that but he decided to come in today to have things \"checked out\". He denies any change in symptoms with activity or position. The present time he says the symptoms have resolved. No further specifics given at this time. Past Medical History:   Diagnosis Date    Atrial fibrillation (HCC)     CHADS 0  (-CHF, -HTN, -AGE, -DM, -CVA)    Cardiac echocardiogram 06/29/2010    EF 70-75%. Mild conc LVh. Gr 1 DDfx. Mild DELMY.  Cardiac Holter monitor, normal 06/28/2010    Normal Holter study.  Cardiac nuclear imaging test 11/22/2010    Sm area of apical ischemia and inferolateral scar, both possibly apical thinning. No WMA. EF 60%.  Impotence of organic origin     Pacemaker     3/11  MEDTRONIC    Personal history of malignant neoplasm of prostate     T1a, [place] 6 (3+3)     Prostate cancer [address] [address]     PUD (peptic ulcer disease)     Sick sinus syndrome ([address]     status post implantation of Medtronic dual-chamber permanent pacemaker 3/22/11.  Unspecified essential hypertension     Venereal disease        Past Surgical History:   Procedure Laterality Date    HX CATARACT REMOVAL      HX MOHS PROCEDURES  1996    OR COLONOSCOPY FLX DX W/COLLJ SPEC WHEN PFRMD      OR CRYOSURG ABLATION OF PROSTATE  2/08    [facility], Dr. Marquis Aceves         History reviewed. No pertinent family history.     Social History     Socioeconomic History    Marital status:      Spouse name: Not on file    Number of children: Not on file    Years of education: Not on file    Highest education level: Not on file   Occupational History    Not on file   Social Needs    Financial resource strain: Not on file    Food insecurity     Worry: Not on file     Inability: Not on file    Transportation needs     Medical: Not on file     Non-medical: Not on file   Tobacco Use    Smoking status: Former Smoker     Packs/day: 0.25     Years: 1.00     Pack years: 0.25     Quit date: 1981     Years since quittin.0    Smokeless tobacco: Never Used   Substance and Sexual Activity    Alcohol use: No    Drug use: No    Sexual activity: Not on file   Lifestyle    Physical activity     Days per week: Not on file     Minutes per session: Not on file    Stress: Not on file   Relationships    Social connections     Talks on phone: Not on file     Gets together: Not on file     Attends Jainism service: Not on file     Active member of club or organization: Not on file     Attends meetings of clubs or organizations: Not on file     Relationship status: Not on file    Intimate partner violence     Fear of current or ex partner: Not on file     Emotionally abused: Not on file     Physically abused: Not on file     Forced sexual activity: Not on file   Other Topics Concern    Not on file   Social History Narrative    Not on file         ALLERGIES: Shellfish containing products    Review of Systems   Constitutional: Negative. HENT: Negative. Eyes: Negative. Respiratory: Positive for chest tightness. Cardiovascular: Negative. Gastrointestinal: Negative. Genitourinary: Negative. Musculoskeletal: Negative. Skin: Negative. Neurological: Negative. Psychiatric/Behavioral: Negative.         Vitals:    21 0835   BP: (!) 145/84   Pulse: 78   Resp: 17   Temp: 98.3 °F (36.8 °C)   SpO2: 100%   Weight: 99.8 kg (220 lb)   Height: 6' 2\" (1.88 m) Physical Exam  Vitals signs and nursing note reviewed. Constitutional:       Appearance: He is well-developed. HENT:      Head: Normocephalic and atraumatic. Nose: Nose normal.      Mouth/Throat:      Mouth: Mucous membranes are moist.   Eyes:      Pupils: Pupils are equal, round, and reactive to light. Neck:      Musculoskeletal: Neck supple. Cardiovascular:      Rate and Rhythm: Normal rate and regular rhythm. Pulmonary:      Effort: Pulmonary effort is normal.      Breath sounds: Normal breath sounds. Abdominal:      Palpations: Abdomen is soft. Musculoskeletal: Normal range of motion. Skin:     General: Skin is warm and dry. Neurological:      Mental Status: He is alert and oriented to person, place, and time.           MDM  Number of Diagnoses or Management Options  Diagnosis management comments: EKG was read at 8:36 AM showing an atrial paced rhythm at a rate of 72         Procedures

## 2021-04-12 NOTE — ED NOTES
Report given to Nae Osborne RN at [facility], ED charge nurse. Pt accepted by Dr Maria E Chowdary and to go to Cath Lab. Upon arrival to barbara Tejeda.    Per pt last ate at 0800

## 2021-04-12 NOTE — Clinical Note
Balloon inserted. Balloon inflated using multiple inflations inflation technique. Lesion #1: Pressure = 8 gina; Duration = 8 sec. Inflation 2: Pressure = 6 gina; Duration = 9 sec.

## 2021-04-12 NOTE — PROGRESS NOTES
Right wrist D-STAT band removed, no bleeding or swelling. Sterile hemostatic dressing applied. Normal radial pulse, normal distal circulation and neuro check. Safety splint applied. Safety instructions reviewed with the patient.

## 2021-04-12 NOTE — Clinical Note
TRANSFER - IN REPORT:     Verbal report received from: Shelley Chiu RN. Report consisted of patient's Situation, Background, Assessment and   Recommendations(SBAR). Opportunity for questions and clarification was provided. Assessment completed upon patient's arrival to unit and care assumed. Patient transported with a Cardiac Cath Tech / Patient Care Tech.

## 2021-04-12 NOTE — Clinical Note
Right groin and right brachial prepped with ChloraPrep and draped. Wet prep solution applied at: 1508. Wet prep solution dried at: 1511. Wet prep elapsed drying time: 3 mins.

## 2021-04-12 NOTE — CONSULTS
[facility] - Consult Note    Consultation request by Cassidy Hicks MD for advice/opinion related to evaluating Non-STEMI (non-ST elevated myocardial infarction) [facility]) [I21.4]    Date of  Admission: 4/12/2021  8:29 AM   Primary Care Physician:  Ciara Mar MD     Assessment:     Patient Active Problem List   Diagnosis Code    Atrial fibrillation [facility]) I48.91    Sick sinus syndrome ([facility] [address] I49.5    Impotence of organic origin N52.9    Personal history of malignant neoplasm of prostate Z85.46    Elevated prostate specific antigen (PSA) R97.20    HTN (hypertension) I10    Dyslipidemia E78.5    Essential hypertension I10    ACP (advance care planning) Z71.89    Cardiac pacemaker in situ Z95.0    Non-STEMI (non-ST elevated myocardial infarction) ([facility] [address] I21.4     -Unstable angina with dynamic EKG changes on arrival. Patient with no symptoms at this time, but concerning history over the last month or more with decreased exercise tolerance at home. Inferolateral changes on initial EKG that normalized with elevated troponin. History of paroxysmal atrial fibrillation. Patient had one episode in January of this year lasting for about 10 hours with controlled ventricular rates. He is on a daily ASA.   -Sick sinus syndrome. Status post dual-chamber permanent pacemaker. Normal device function on interrogation during last office visit. Battery life estimated at 21 months.  -Essential hypertension. Patient blood pressure is well controlled on diltiazem as monotherapy.  -Dyslipidemia and monitored by PMD    -Primary cardiologist Dr. Zahra Rosa:     Independently seen and evaluated. Agree with below. Patient with unstable angina with dynamic ECG changes. We will proceed with coronary angiography. He states that he had coronary angiography done at [facility] about 25 years ago. At that time, no significant CAD was noted.     -Stable  -Patient with history of unstable angina and additional history of decreased exercise tolerance over the last month or more. -He has been adherent to his medication regimen.  -In light of his history and dynamic EKG changes, we will proceed to the cardiac cath lab. -Risks, benefits to the procedure explained to patient who understands and is willing to proceed. -Consent signed and all questions answered.   -Patient given SoluMedrol and IV Benadryl for allergy. Echo in the am.  -More recommendations pending cath and clinical course.  -Will follow. History of Present Illness: This is a 79 y.o. male admitted for Non-STEMI (non-ST elevated myocardial infarction) ([facility] [address] [I21.4]. Patient complains of:  Patient presented to the ER with complaint of chest pressure. He states that last night he was fine and then had a sudden onset of chest pressure that lasted 15 minutes and resolved on its own. He went to bed, but in the morning with light activity it began again, which prompted patient to go to ER. We was transported from [facility] to the [facility] ER. Cardiac risk factors: family history, dyslipidemia, male gender, hypertension, age      Review of Symptoms:  Except as stated above include:  Constitutional:  negative  Respiratory:  negative  Cardiovascular:  negative  Gastrointestinal: negative  Genitourinary:  negative  Musculoskeletal:  Negative  Neurological:  Negative  Dermatological:  Negative  Endocrinological: Negative  Psychological:  Negative    Pertinent items are noted in HPI. Past Medical History:     Past Medical History:   Diagnosis Date    Atrial fibrillation (HCC)     CHADS 0  (-CHF, -HTN, -AGE, -DM, -CVA)    Cardiac echocardiogram 06/29/2010    EF 70-75%. Mild conc LVh. Gr 1 DDfx. Mild DELMY.  Cardiac Holter monitor, normal 06/28/2010    Normal Holter study.  Cardiac nuclear imaging test 11/22/2010    Sm area of apical ischemia and inferolateral scar, both possibly apical thinning. No WMA. EF 60%.       Impotence of organic origin     Pacemaker     3/11  MEDTRONIC    Personal history of malignant neoplasm of prostate     T1a, [place] 6 (3+3)     Prostate cancer ([facility] [address]     PUD (peptic ulcer disease)     Sick sinus syndrome ([facility] [address]     status post implantation of Medtronic dual-chamber permanent pacemaker 3/22/11.  Unspecified essential hypertension     Venereal disease          Social History:     Social History     Socioeconomic History    Marital status:      Spouse name: Not on file    Number of children: Not on file    Years of education: Not on file    Highest education level: Not on file   Tobacco Use    Smoking status: Former Smoker     Packs/day: 0.25     Years: 1.00     Pack years: 0.25     Quit date: 1981     Years since quittin.0    Smokeless tobacco: Never Used   Substance and Sexual Activity    Alcohol use: No    Drug use: No        Family History:   History reviewed. No pertinent family history. Medications: Allergies   Allergen Reactions    Shellfish Containing Products Swelling and Other (comments)        Current Facility-Administered Medications   Medication Dose Route Frequency    heparin 25,000 units in D5W 250 ml infusion  10-25 Units/kg/hr IntraVENous TITRATE     Current Outpatient Medications   Medication Sig    multivitamin, tx-iron-ca-min (THERA-M w/ IRON) 9 mg iron-400 mcg tab tablet Take 1 Tab by mouth daily.  dilTIAZem ER (CARDIZEM CD) 240 mg capsule TAKE 1 CAPSULE TWICE A DAY    fish oil-omega-3 fatty acids 340-1,000 mg capsule Take 1,000 mg by mouth.  latanoprost (XALATAN) 0.005 % ophthalmic solution Administer 1 Drop to both eyes daily.  brimonidine (ALPHAGAN) 0.2 % ophthalmic solution Administer 1 Drop to both eyes two (2) times a day.  aspirin 81 mg tablet Take 2 Tabs by mouth daily.  ZINC MTH/COPPER/SAW PALM/GNSG (PROSTATE HEALTH FORMULA PO) Take 1 Tab by mouth daily.          Physical Exam:     Visit Vitals  /73   Pulse 65   Temp 97.9 °F (36.6 °C)   Resp 18   Ht 6' 2\" (1.88 m)   Wt 99.8 kg (220 lb)   SpO2 100%   BMI 28.25 kg/m²     BP Readings from Last 3 Encounters:   04/12/21 136/73   03/22/21 138/78   03/19/21 117/63     Pulse Readings from Last 3 Encounters:   04/12/21 65   04/02/21 70   03/26/21 62     Wt Readings from Last 3 Encounters:   04/12/21 99.8 kg (220 lb)   04/02/21 100.7 kg (222 lb)   03/26/21 98.8 kg (217 lb 12.8 oz)       General:  alert, cooperative, no distress, appears stated age  Neck:  nontender, no carotid bruit, no JVD  Lungs:  clear to auscultation bilaterally  Heart:  regular rate and rhythm, S1, S2 normal, no murmur, click, rub or gallop  Abdomen:  abdomen is soft without significant tenderness, masses, organomegaly or guarding  Extremities:  extremities normal, atraumatic, no cyanosis or edema  Skin: Warm and dry.  no hyperpigmentation, vitiligo, or suspicious lesions  Neuro: alert, oriented x3, affect appropriate, no focal neurological deficits, moves all extremities well, no involuntary movements  Psych: non focal     Data Review:     Recent Labs     04/12/21  0847   WBC 11.7   HGB 14.1   HCT 42.2   *     Recent Labs     04/12/21  0847      K 4.0      CO2 26   *   BUN 17   CREA 1.16   CA 9.1   MG 2.1       Results for orders placed or performed during the hospital encounter of 04/12/21   EKG, 12 LEAD, INITIAL   Result Value Ref Range    Ventricular Rate 72 BPM    Atrial Rate 72 BPM    P-R Interval 130 ms    QRS Duration 92 ms    Q-T Interval 392 ms    QTC Calculation (Bezet) 429 ms    Calculated P Axis 47 degrees    Calculated R Axis -32 degrees    Calculated T Axis 27 degrees    Diagnosis       Normal sinus rhythm  Left axis deviation  RSR' or QR pattern in V1 suggests right ventricular conduction delay  ST depression, consider subendocardial injury or digitalis effect  Abnormal ECG  When compared with ECG of 04-FEB-2019 10:28,  QRS axis shifted left  ST now depressed in Inferior leads  ST now depressed in Anterolateral leads     Results for orders placed or performed in visit on 09/22/20   AMB POC EKG ROUTINE W/ 12 LEADS, INTER & REP    Impression    See progress note. Results for orders placed or performed in visit on 05/01/13   PACEMAKER CHECK    Impression    No events. A paced 96%. V sensed 96%. Lead impedance and  threshold WNl.         All Cardiac Markers in the last 24 hours:    Lab Results   Component Value Date/Time    TROIQ 0.14 (H) 04/12/2021 08:47 AM       Last Lipid:    Lab Results   Component Value Date/Time    Cholesterol, total 165 01/22/2021 09:22 AM    HDL Cholesterol 51 01/22/2021 09:22 AM    LDL, calculated 101.4 (H) 01/22/2021 09:22 AM    Triglyceride 63 01/22/2021 09:22 AM    CHOL/HDL Ratio 3.2 01/22/2021 09:22 AM       Signed By: Roxane Schaumann, PA     April 12, 2021

## 2021-04-12 NOTE — PROGRESS NOTES
Cath holding summary     Patient escorted to cath holding from ED via medical transport, alert and oriented x 4, voicing no complaints. Changed into gown and placed on monitor. NPO since MN. Lab results, med rec and H&P reviewed on chart. PIV x 1 inserted without difficulty.

## 2021-04-13 ENCOUNTER — APPOINTMENT (OUTPATIENT)
Dept: NON INVASIVE DIAGNOSTICS | Age: 81
DRG: 247 | End: 2021-04-13
Attending: PHYSICIAN ASSISTANT
Payer: MEDICARE

## 2021-04-13 VITALS
WEIGHT: 216 LBS | BODY MASS INDEX: 27.72 KG/M2 | HEART RATE: 52 BPM | RESPIRATION RATE: 16 BRPM | TEMPERATURE: 98.5 F | OXYGEN SATURATION: 97 % | DIASTOLIC BLOOD PRESSURE: 76 MMHG | HEIGHT: 74 IN | SYSTOLIC BLOOD PRESSURE: 146 MMHG

## 2021-04-13 LAB
APTT PPP: 33.5 SEC (ref 23–36.4)
APTT PPP: 35.1 SEC (ref 23–36.4)
BASOPHILS # BLD: 0 K/UL (ref 0–0.1)
BASOPHILS # BLD: 0 K/UL (ref 0–0.1)
BASOPHILS NFR BLD: 0 % (ref 0–2)
BASOPHILS NFR BLD: 0 % (ref 0–2)
DIFFERENTIAL METHOD BLD: ABNORMAL
DIFFERENTIAL METHOD BLD: ABNORMAL
ECHO AO ROOT DIAM: 3.68 CM
ECHO LA AREA 4C: 16.15 CM2
ECHO LA VOL 2C: 41.46 ML (ref 18–58)
ECHO LA VOL 4C: 34.88 ML (ref 18–58)
ECHO LA VOL BP: 45.44 ML (ref 18–58)
ECHO LA VOL/BSA BIPLANE: 20.24 ML/M2 (ref 16–28)
ECHO LA VOLUME INDEX A2C: 18.46 ML/M2 (ref 16–28)
ECHO LA VOLUME INDEX A4C: 15.53 ML/M2 (ref 16–28)
ECHO LV INTERNAL DIMENSION DIASTOLIC: 3.58 CM (ref 4.2–5.9)
ECHO LV INTERNAL DIMENSION SYSTOLIC: 2.79 CM
ECHO LV IVSD: 1.18 CM (ref 0.6–1)
ECHO LV MASS 2D: 136.8 G (ref 88–224)
ECHO LV MASS INDEX 2D: 60.9 G/M2 (ref 49–115)
ECHO LV POSTERIOR WALL DIASTOLIC: 1.18 CM (ref 0.6–1)
ECHO LVOT DIAM: 2.02 CM
ECHO LVOT PEAK GRADIENT: 6.4 MMHG
ECHO LVOT PEAK VELOCITY: 126.18 CM/S
ECHO LVOT SV: 103.7 ML
ECHO LVOT VTI: 32.36 CM
ECHO MV A VELOCITY: 146.53 CM/S
ECHO MV E DECELERATION TIME (DT): 425.66 MS
ECHO MV E VELOCITY: 88.02 CM/S
ECHO MV E/A RATIO: 0.6
ECHO TV REGURGITANT MAX VELOCITY: 268.12 CM/S
ECHO TV REGURGITANT PEAK GRADIENT: 28.76 MMHG
EOSINOPHIL # BLD: 0 K/UL (ref 0–0.4)
EOSINOPHIL # BLD: 0 K/UL (ref 0–0.4)
EOSINOPHIL NFR BLD: 0 % (ref 0–5)
EOSINOPHIL NFR BLD: 0 % (ref 0–5)
ERYTHROCYTE [DISTWIDTH] IN BLOOD BY AUTOMATED COUNT: 12.1 % (ref 11.6–14.5)
ERYTHROCYTE [DISTWIDTH] IN BLOOD BY AUTOMATED COUNT: 12.4 % (ref 11.6–14.5)
HCT VFR BLD AUTO: 41.2 % (ref 36–48)
HCT VFR BLD AUTO: 42.2 % (ref 36–48)
HGB BLD-MCNC: 13.9 G/DL (ref 13–16)
HGB BLD-MCNC: 14.1 G/DL (ref 13–16)
LVOT MG: 3.29 MMHG
LYMPHOCYTES # BLD: 0.5 K/UL (ref 0.9–3.6)
LYMPHOCYTES # BLD: 0.8 K/UL (ref 0.9–3.6)
LYMPHOCYTES NFR BLD: 4 % (ref 21–52)
LYMPHOCYTES NFR BLD: 7 % (ref 21–52)
MCH RBC QN AUTO: 29.3 PG (ref 24–34)
MCH RBC QN AUTO: 29.3 PG (ref 24–34)
MCHC RBC AUTO-ENTMCNC: 33.4 G/DL (ref 31–37)
MCHC RBC AUTO-ENTMCNC: 33.7 G/DL (ref 31–37)
MCV RBC AUTO: 86.7 FL (ref 74–97)
MCV RBC AUTO: 87.6 FL (ref 74–97)
MONOCYTES # BLD: 0.7 K/UL (ref 0.05–1.2)
MONOCYTES # BLD: 4.2 K/UL (ref 0.05–1.2)
MONOCYTES NFR BLD: 36 % (ref 3–10)
MONOCYTES NFR BLD: 6 % (ref 3–10)
NEUTS SEG # BLD: 10.8 K/UL (ref 1.8–8)
NEUTS SEG # BLD: 6.7 K/UL (ref 1.8–8)
NEUTS SEG NFR BLD: 57 % (ref 40–73)
NEUTS SEG NFR BLD: 89 % (ref 40–73)
PLATELET # BLD AUTO: 128 K/UL (ref 135–420)
PLATELET # BLD AUTO: 130 K/UL (ref 135–420)
PMV BLD AUTO: 11 FL (ref 9.2–11.8)
PMV BLD AUTO: 11.1 FL (ref 9.2–11.8)
RBC # BLD AUTO: 4.75 M/UL (ref 4.35–5.65)
RBC # BLD AUTO: 4.82 M/UL (ref 4.35–5.65)
TROPONIN I SERPL-MCNC: 1.45 NG/ML (ref 0–0.04)
WBC # BLD AUTO: 11.7 K/UL (ref 4.6–13.2)
WBC # BLD AUTO: 12.2 K/UL (ref 4.6–13.2)

## 2021-04-13 PROCEDURE — 85025 COMPLETE CBC W/AUTO DIFF WBC: CPT

## 2021-04-13 PROCEDURE — 99236 HOSP IP/OBS SAME DATE HI 85: CPT | Performed by: INTERNAL MEDICINE

## 2021-04-13 PROCEDURE — 85730 THROMBOPLASTIN TIME PARTIAL: CPT

## 2021-04-13 PROCEDURE — 93306 TTE W/DOPPLER COMPLETE: CPT

## 2021-04-13 PROCEDURE — 65660000004 HC RM CVT STEPDOWN

## 2021-04-13 PROCEDURE — 84484 ASSAY OF TROPONIN QUANT: CPT

## 2021-04-13 PROCEDURE — 93005 ELECTROCARDIOGRAM TRACING: CPT

## 2021-04-13 PROCEDURE — 36415 COLL VENOUS BLD VENIPUNCTURE: CPT

## 2021-04-13 PROCEDURE — 2709999900 HC NON-CHARGEABLE SUPPLY

## 2021-04-13 PROCEDURE — 99218 HC RM OBSERVATION: CPT

## 2021-04-13 PROCEDURE — 74011250637 HC RX REV CODE- 250/637: Performed by: INTERNAL MEDICINE

## 2021-04-13 RX ORDER — NITROGLYCERIN 400 UG/1
1 SPRAY ORAL
Qty: 1 BOTTLE | Refills: 0 | Status: SHIPPED | OUTPATIENT
Start: 2021-04-13

## 2021-04-13 RX ORDER — ATORVASTATIN CALCIUM 20 MG/1
20 TABLET, FILM COATED ORAL
Qty: 30 TAB | Refills: 0 | Status: SHIPPED | OUTPATIENT
Start: 2021-04-13 | End: 2021-04-27 | Stop reason: SDUPTHER

## 2021-04-13 RX ORDER — METOPROLOL SUCCINATE 50 MG/1
50 TABLET, EXTENDED RELEASE ORAL DAILY
Qty: 30 TAB | Refills: 0 | Status: SHIPPED | OUTPATIENT
Start: 2021-04-14 | End: 2021-04-27 | Stop reason: SDUPTHER

## 2021-04-13 RX ADMIN — ASPIRIN 81 MG: 81 TABLET ORAL at 10:12

## 2021-04-13 RX ADMIN — Medication 10 ML: at 13:01

## 2021-04-13 RX ADMIN — METOPROLOL SUCCINATE 50 MG: 50 TABLET, FILM COATED, EXTENDED RELEASE ORAL at 10:11

## 2021-04-13 RX ADMIN — Medication 10 ML: at 06:00

## 2021-04-13 RX ADMIN — TICAGRELOR 90 MG: 90 TABLET ORAL at 10:12

## 2021-04-13 NOTE — PROGRESS NOTES
D/C order noted for today. Orders reviewed. No needs identified at this time. CM remains available if needed. Per pt, brother to transport home.  Mojgan Galvez, [phone]

## 2021-04-13 NOTE — PROGRESS NOTES
Bedside and Verbal shift change report given to Hiro Christopher (oncoming nurse) by Trinidad Davenport (offgoing nurse). Report included the following information SBAR, Kardex, Intake/Output and Cardiac Rhythm Paced/NSR.

## 2021-04-13 NOTE — H&P
History and Physical    Chief complaint: Chest pain    PCP:Karissa Guerrero MD    HPI:     Ryan Tate is a 79 y.o.  male who presented to [facility] emergency room with a complaint of chest pain. Patient stated he had 2 episodes at home especially on exertion. In the emergency room, he was found out to have a non-STEMI and transferred to [facility] and underwent cardiac cath. The hospitalist service was contacted yesterday but no one inform the hospital service the patient is here so there was no history and physical done until now. At this point, patient is feeling much better. He is ready to go home. Denies any headaches or dizziness. No visual disturbances. Denies any chest pain shortness of breath or cough. No nausea or vomiting. No abdominal pain. No bowel or bladder disturbances. He says he was walking in the room without any problem. Denies smoking cigarettes or drinking any alcohol. Family history positive for cardiac issues. He lives with his family member. Patient was found out to have LAD disease, circumflex distal OM's. Patient had proximal LAD and long mid LAD stenting done. He stated he was taking the same medication what ever we have on chart. .  It seems like he was taking aspirin and Cardizem by to coming to the hospital.    Past Medical History:   Diagnosis Date    Atrial fibrillation (HCC)     CHADS 0  (-CHF, -HTN, -AGE, -DM, -CVA)    Cardiac echocardiogram 06/29/2010    EF 70-75%. Mild conc LVh. Gr 1 DDfx. Mild DELMY.  Cardiac Holter monitor, normal 06/28/2010    Normal Holter study.  Cardiac nuclear imaging test 11/22/2010    Sm area of apical ischemia and inferolateral scar, both possibly apical thinning. No WMA. EF 60%.       Impotence of organic origin     Pacemaker     3/11  MEDTRONIC    Personal history of malignant neoplasm of prostate     T1a, [place] 6 (3+3)     Prostate cancer ([address]     PUD (peptic ulcer disease)  Sick sinus syndrome ([facility]     status post implantation of Medtronic dual-chamber permanent pacemaker 3/22/11.  Unspecified essential hypertension     Venereal disease       Past Surgical History:   Procedure Laterality Date    HX CATARACT REMOVAL      HX MOHS PROCEDURES      AK COLONOSCOPY FLX DX W/COLLJ SPEC WHEN PFRMD      AK CRYOSURG ABLATION OF PROSTATE      [facility], Dr. Aicha Diaz     History reviewed. No pertinent family history. Social History     Tobacco Use    Smoking status: Former Smoker     Packs/day: 0.25     Years: 1.00     Pack years: 0.25     Quit date: 1981     Years since quittin.0    Smokeless tobacco: Never Used   Substance Use Topics    Alcohol use: No       Prior to Admission medications    Medication Sig Start Date End Date Taking? Authorizing Provider   multivitamin, tx-iron-ca-min (THERA-M w/ IRON) 9 mg iron-400 mcg tab tablet Take 1 Tab by mouth daily. Provider, Historical   dilTIAZem ER (CARDIZEM CD) 240 mg capsule TAKE 1 CAPSULE TWICE A DAY 20   Salomon Jacob MD   fish oil-omega-3 fatty acids 340-1,000 mg capsule Take 1,000 mg by mouth. Provider, Historical   latanoprost (XALATAN) 0.005 % ophthalmic solution Administer 1 Drop to both eyes daily. 17   Provider, Historical   brimonidine (ALPHAGAN) 0.2 % ophthalmic solution Administer 1 Drop to both eyes two (2) times a day. 18   Provider, Historical   aspirin 81 mg tablet Take 2 Tabs by mouth daily. 7/15/13   Salomon Jacob MD   ZINC MTH/COPPER/SAW PALM/GNSG (PROSTATE HEALTH FORMULA PO) Take 1 Tab by mouth daily. Provider, Historical     Allergies   Allergen Reactions    Shellfish Containing Products Swelling and Other (comments)        Review of Systems:  Pertinent items are noted in the History of Present Illness. Objective:      Intake and Output:    701 - 1900  In: 421 [P.O.:933]  Out: -   1901 - 700  In: 531 [P.O.:480; I.V.:350]  Out: 200 [Urine:200]    Physical Exam:       General appearance - alert, well appearing, and in no distress  Eyes - sclera anicteric, no pallor  Nose - no obvious nasal discharge. Neck - supple, no JVD, trachea is midline  Chest -clear air entry noted in bases, no wheezes  Heart - S1 and S2 normal, pacemaker is in situ. Abdomen - soft, nontender, nondistended, Bowel sounds present  Neurological - alert, oriented, normal speech, no focal findings noted  Musculoskeletal - no joint tenderness or swelling of knees bilaterally  Extremities - no pedal edema noted      Data Review:   Recent Results (from the past 24 hour(s))   PTT    Collection Time: 04/13/21  5:16 AM   Result Value Ref Range    aPTT 35.1 23.0 - 36.4 SEC   CBC WITH AUTOMATED DIFF    Collection Time: 04/13/21  5:16 AM   Result Value Ref Range    WBC 12.2 4.6 - 13.2 K/uL    RBC 4.75 4.35 - 5.65 M/uL    HGB 13.9 13.0 - 16.0 g/dL    HCT 41.2 36.0 - 48.0 %    MCV 86.7 74.0 - 97.0 FL    MCH 29.3 24.0 - 34.0 PG    MCHC 33.7 31.0 - 37.0 g/dL    RDW 12.1 11.6 - 14.5 %    PLATELET 538 (L) 316 - 420 K/uL    MPV 11.0 9.2 - 11.8 FL    NEUTROPHILS 89 (H) 40 - 73 %    LYMPHOCYTES 4 (L) 21 - 52 %    MONOCYTES 6 3 - 10 %    EOSINOPHILS 0 0 - 5 %    BASOPHILS 0 0 - 2 %    ABS. NEUTROPHILS 10.8 (H) 1.8 - 8.0 K/UL    ABS. LYMPHOCYTES 0.5 (L) 0.9 - 3.6 K/UL    ABS. MONOCYTES 0.7 0.05 - 1.2 K/UL    ABS. EOSINOPHILS 0.0 0.0 - 0.4 K/UL    ABS.  BASOPHILS 0.0 0.0 - 0.1 K/UL    DF AUTOMATED     TROPONIN I    Collection Time: 04/13/21  5:16 AM   Result Value Ref Range    Troponin-I, QT 1.45 (H) 0.0 - 0.045 NG/ML   EKG, 12 LEAD, SUBSEQUENT    Collection Time: 04/13/21  5:55 AM   Result Value Ref Range    Ventricular Rate 71 BPM    Atrial Rate 71 BPM    P-R Interval 148 ms    QRS Duration 92 ms    Q-T Interval 416 ms    QTC Calculation (Bezet) 452 ms    Calculated P Axis 61 degrees    Calculated R Axis 27 degrees    Calculated T Axis 63 degrees    Diagnosis       Electronic atrial pacemaker  T wave abnormality, consider anterior ischemia  Abnormal ECG  When compared with ECG of 12-APR-2021 12:51,  T wave inversion now evident in Anterior leads     ECHO ADULT COMPLETE    Collection Time: 04/13/21  9:21 AM   Result Value Ref Range    IVSd 1.18 (A) 0.60 - 1.00 cm    LVIDd 3.58 (A) 4.20 - 5.90 cm    LVIDs 2.79 cm    LVOT d 2.02 cm    LVPWd 1.18 (A) 0.60 - 1.00 cm    LVOT Peak Gradient 6.40 mmHg    Left Ventricular Outflow Tract Mean Gradient 3.29 mmHg    LVOT .7 mL    LVOT Peak Velocity 126.18 cm/s    LVOT VTI 32.36 cm    LA Volume 45.44 18.0 - 58.0 mL    LA Area 4C 16.15 cm2    LA Vol 2C 41.46 18.00 - 58.00 mL    LA Vol 4C 34.88 18.00 - 58.00 mL    MV A Jose 146.53 cm/s    Mitral Valve E Wave Deceleration Time 425.66 ms    MV E Jose 88.02 cm/s    Triscuspid Valve Regurgitation Peak Gradient 28.76 mmHg    TR Max Velocity 268.12 cm/s    Ao Root D 3.68 cm    MV E/A 0.60     LV Mass .8 88.0 - 224.0 g    LV Mass AL Index 60.9 49.0 - 115.0 g/m2    LA Vol Index 20.24 16.00 - 28.00 ml/m2    LA Vol Index 18.46 16.00 - 28.00 ml/m2    LA Vol Index 15.53 16.00 - 28.00 ml/m2   PTT    Collection Time: 04/13/21 10:30 AM   Result Value Ref Range    aPTT 33.5 23.0 - 36.4 SEC     CXR Results  (Last 48 hours)    None            Assessment:     Active Problems:    CAD (coronary artery disease) (4/12/2021)      Non-STEMI (non-ST elevated myocardial infarction) ([facility] (4/12/2021)      1. Unstable angina s/p treatment  2. Coronary artery disease status post stenting to LAD  3. History of paroxysmal atrial fibrillation, rate controlled  4. Sick sinus syndrome status post pacemaker  5. Hypertension  6. Dyslipidemia  7. History of tobacco use. Plan:     The patient was admitted to telemetry bed. He will be continued on aspirin, Brilinta, statin and beta-blocker. Echocardiogram report reviewed. Cardiology has evaluated this patient and cleared him for the discharge.   Patient wishes to be a partial code and does not want intubation/DNI. Total time to take care of this patient was 55 minutes and more than 50% of time was spent counseling and coordinating care. Disclaimer: Sections of this note are dictated using utilizing voice recognition software, which may have resulted in some phonetic based errors in grammar and contents. Even though attempts were made to correct all the mistakes, some may have been missed, and remained in the body of the document. If questions arise, please contact our department.

## 2021-04-13 NOTE — PROGRESS NOTES
Problem: Falls - Risk of  Goal: *Absence of Falls  Description: Document Brian Booth Fall Risk and appropriate interventions in the flowsheet.   Outcome: Resolved/Met     Problem: Patient Education: Go to Patient Education Activity  Goal: Patient/Family Education  Outcome: Resolved/Met

## 2021-04-13 NOTE — DISCHARGE SUMMARY
Physician Discharge Summary       Patient: Bryon Martínez MRN: [id number]  SSN: [id number]    YOB: 1940  Age: 79 y.o. Sex: male    PCP: Ciara Mar MD    Allergies: Shellfish containing products    Admit date: 4/12/2021  Admitting Provider: Cassidy Hicks MD    Discharge date: 4/13/2021  Discharging Provider: Fransisca Shah MD    * Admission Diagnoses: Non-STEMI (non-ST elevated myocardial infarction) [facility]) [I21.4]  CAD (coronary artery disease) [I25.10]    * Discharge Diagnoses:      1. Non-STEMI  2. Coronary artery disease status post stenting to LAD  3. History of paroxysmal atrial fibrillation, rate controlled  4. Sick sinus syndrome status post pacemaker  5. Hypertension  6. Dyslipidemia  7. History of tobacco use. [facility] Course: The patient presented to the hospital on April 12, 2021 with complaint of chest pain. He was found out to have non-STEMI. He underwent cardiac cath which showed LAD disease and had a stent placed. Patient also had eccentric circumflex disease which will be staged intervention in 2 to 4 weeks per cardiology. He was treated with aspirin and added on Brilinta, statin and Toprol-XL. He was tolerating medications very well. He was seen by cardiology service and cleared for the discharge. He had an echocardiogram which showed normal ejection fraction. Patient was ambulating on the day of discharge without any chest pain and he will be discharged home on the following medication.     * Procedures:   Procedure(s):  Left Heart Cath / Coronary Angiography  Left Ventriculography  Coronary Angiography  Insert Stent Jose Coronary      Consults: Cardiology    Significant Diagnostic Studies: Echocardiogram    Discharge Exam:  Please refer my progress note from April 30, 2021 for further detail    * Discharge Condition: improved  * Disposition: Home    Discharge Medications:  Current Discharge Medication List      START taking these medications Details   atorvastatin (LIPITOR) 20 mg tablet Take 1 Tab by mouth nightly. Qty: 30 Tab, Refills: 0      metoprolol succinate (TOPROL-XL) 50 mg XL tablet Take 1 Tab by mouth daily. Qty: 30 Tab, Refills: 0      ticagrelor (BRILINTA) 90 mg tablet Take 1 Tab by mouth two (2) times a day. Qty: 60 Tab, Refills: 0      nitroglycerin (NITROLINGUAL) 400 mcg/spray spray 1 Spray by SubLINGual route every five (5) minutes as needed for Chest Pain (Up to 3 maximum doses). Qty: 1 Bottle, Refills: 0         CONTINUE these medications which have CHANGED    Details   aspirin 81 mg tablet Take 1 Tab by mouth daily. Qty: 30 Tab, Refills: 0         CONTINUE these medications which have NOT CHANGED    Details   multivitamin, tx-iron-ca-min (THERA-M w/ IRON) 9 mg iron-400 mcg tab tablet Take 1 Tab by mouth daily. fish oil-omega-3 fatty acids 340-1,000 mg capsule Take 1,000 mg by mouth.      latanoprost (XALATAN) 0.005 % ophthalmic solution Administer 1 Drop to both eyes daily. brimonidine (ALPHAGAN) 0.2 % ophthalmic solution Administer 1 Drop to both eyes two (2) times a day. ZINC MTH/COPPER/SAW PALM/GNSG (PROSTATE HEALTH FORMULA PO) Take 1 Tab by mouth daily. STOP taking these medications       dilTIAZem ER (CARDIZEM CD) 240 mg capsule Comments:   Reason for Stopping:               * Follow-up Care/Patient Instructions:   Activity: Activity as tolerated  Diet: Cardiac Diet  Wound Care: None needed    Follow-up Information     Follow up With Specialties Details Why Contact Info    Jaison Rajan MD Internal Medicine   [address]  [phone]          Follow-up Appointments   Procedures    FOLLOW UP VISIT Appointment in: Other (Specify) With primary care physician in 5 days With Dr. Malathi Jack in 2 weeks     With primary care physician in 5 days  With Dr. Malathi Jack in 2 weeks     Standing Status:   Standing     Number of Occurrences:   1     Order Specific Question: Appointment in     Answer: Other (Specify)     Total time to take care of this patient was 35 minutes and more than 50% of time was spent counseling and coordinating care. Disclaimer: Sections of this note are dictated using utilizing voice recognition software, which may have resulted in some phonetic based errors in grammar and contents. Even though attempts were made to correct all the mistakes, some may have been missed, and remained in the body of the document. If questions arise, please contact our department.       Signed:  Myron Leblanc MD  4/13/2021  2:48 PM

## 2021-04-13 NOTE — PROGRESS NOTES
65: TRANSFER - IN REPORT:    Verbal report received from Mirza Medeiros on Jignesh Esquivel  being received from [facility] for routine progression of care      Report consisted of patients Situation, Background, Assessment and   Recommendations(SBAR). Information from the following report(s) SBAR, Kardex, Intake/Output and Cardiac Rhythm NSR/Demand paced was reviewed with the receiving nurse. Opportunity for questions and clarification was provided. Assessment completed upon patients arrival to unit and care assumed.

## 2021-04-13 NOTE — PROGRESS NOTES
Reason for Admission:  Non-STEMI (non-ST elevated myocardial infarction) ([facility] [address] [I21.4]  CAD (coronary artery disease) [I25.10]                 RUR Score:    n/a            Plan for utilizing home health:   none                      Likelihood of Readmission:   LOW                         Transition of Care Plan:    home          Initial assessment completed with patient. Cognitive status of patient: oriented to time, place, person and situation. Face sheet information confirmed:  yes. The patient designates brother, Ramana quiroga, to participate in his discharge plan and to receive any needed information. This patient lives in a single family home alone. Patient is able to navigate steps as needed. Prior to hospitalization, patient was considered to be independent with ADLs/IADLS : yes . Patient has a current ACP document on file: yes      Healthcare Decision Maker:     Click here to complete [facility] including selection of the Healthcare Decision Maker Relationship (ie \"Primary\")    The patient's brother will be available to transport patient home upon discharge. The patient already has Brice Grey, he does not use,  medical equipment available in the home. Patient is not currently active with home health. IPatient has not stayed in a skilled nursing facility or rehab. Was  stay within last 60 days : no. This patient is on dialysis :no    I  L Currently, the discharge plan is Home. The patient states that he can obtain his medications from the pharmacy, and take his medications as directed. Patient's current insurance is [employer]     Patient and/or next of kin has been given and has signed the [facility]  Notification letter and all questions answered. Copy of this notice given to patient and copy placed on chart. Care Management Interventions  PCP Verified by CM:  Yes  Palliative Care Criteria Met (RRAT>21 & CHF Dx)?: No  Mode of Transport at Discharge: Other (see comment)  Transition of Care Consult (CM Consult): Discharge Planning  Discharge Durable Medical Equipment: No  Physical Therapy Consult: No  Occupational Therapy Consult: No  Speech Therapy Consult: No  Current Support Network: Lives Alone  Confirm Follow Up Transport: Family  The Patient and/or Patient Representative was Provided with a Choice of Provider and Agrees with the Discharge Plan?: No  Freedom of Choice List was Provided with Basic Dialogue that Supports the Patient's Individualized Plan of Care/Goals, Treatment Preferences and Shares the Quality Data Associated with the Providers?: No  Discharge Location  Discharge Placement: Home        Russ Laura rn cm    Advance Care Planning     General Advance Care Planning (ACP) Conversation      Date of Conversation: 4/12/2021  Conducted with: Patient with Decision Making Capacity    Healthcare Decision Maker:     Click here to complete [address] including selection of the Healthcare Decision Maker Relationship (ie \"Primary\")      Content/Action Overview:    Has ACP document(s) on file - reflects the patient's care preferences        Emilia Dennis     {

## 2021-04-13 NOTE — ROUTINE PROCESS
TRANSFER - OUT REPORT:    Verbal report given to SHAGUFTA Owens(name) on Lizzette Castellon  being transferred to 2303(unit) for routine progression of care       Report consisted of patients Situation, Background, Assessment and   Recommendations(SBAR). Information from the following report(s) SBAR was reviewed with the receiving nurse. Lines:   Peripheral IV 04/12/21 Right Forearm (Active)   Site Assessment Clean, dry, & intact 04/12/21 1420   Phlebitis Assessment 0 04/12/21 1420   Infiltration Assessment 0 04/12/21 1420   Dressing Status Clean, dry, & intact 04/12/21 1420   Dressing Type Transparent 04/12/21 1420   Hub Color/Line Status Green;Flushed 04/12/21 1420       Peripheral IV 04/12/21 Left;Posterior Hand (Active)   Site Assessment Clean, dry, & intact 04/12/21 1420   Phlebitis Assessment 0 04/12/21 1420   Infiltration Assessment 0 04/12/21 1420   Dressing Status Clean, dry, & intact 04/12/21 1420   Dressing Type Transparent 04/12/21 1420   Hub Color/Line Status Pink;Flushed 04/12/21 1420        Opportunity for questions and clarification was provided. Patient transported with:   Registered Nurse: TIFFANY Covington

## 2021-04-13 NOTE — PROGRESS NOTES
Card[facility]ovascular Specialists - Progress Note    Consultation request by Violeta Sequeira MD for advice/opinion related to evaluating Non-STEMI (non-ST elevated myocardial infarction) [facility]) [I21.4]  CAD (coronary artery disease) [I25.10]    Date of  Admission: 4/12/2021  8:29 AM   Primary Care Physician:  MD Dr. Ravi Alonso Rear:     -CAD, s/p [facility] with PCI (04/12/21)   · Normal LV function with EF 55-60%. · Dominant RCA with diffuse proximal/mid 35% stenosis. · LAD with ostial/proximal 99% stenosis, likely culprit as well as mid segment long 70 to 90% stenosis. · Circumflex distal OM eccentric 70% stenosis with normal flow. · Ostial/proximal 99% LAD stenosis stented to 0% using 3.5 mm HE. · Long mid LAD 70-90% stenosis stented to residual 0% using 3 mm HE, 28 mm length. · He will need to be staged for distal circumflex eccentric stenosis intervention in 2-4 weeks.  -Unstable angina with dynamic EKG changes on arrival. Patient with no symptoms at this time, but concerning history over the last month or more with decreased exercise tolerance at home. Inferolateral changes on initial EKG that normalized with elevated troponin.   -History of paroxysmal atrial fibrillation. Patient had one episode in January of this year lasting for about 10 hours with controlled ventricular rates. He is on a daily ASA and Cardizem. -Sick sinus syndrome. Status post dual-chamber permanent pacemaker. Normal device function on interrogation during last office visit. Battery life estimated at 21 months.  -Essential hypertension. Patient blood pressure is well controlled on diltiazem as monotherapy.  -Dyslipidemia and monitored by PMD  -Tobacco Abuse hx   -Shellfish allergy       -Primary cardiologist Dr. Fareed Sanchez: Will follow up with ECHO results once completed. Hgb and sCr stable. He will need to follow up with his primary cardiologist in the office in 2 weeks.    Recommendations for staged PCI in 2-4 weeks. Patient will need to be discharged with: ASA, Brilinta, BB and high intensity statin. Staff addendum:  Echo preliminary reviewed by me, normal EF. No recurrent pain. Patient can discharged home with consideration for staged PCI to LCx under the guidance of Dr. Lindsey Butler. I saw, examined, and evaluated the patient. I personally reviewed the patient's labs, tests, vitals, orders, medications, updated history, and other providers assessments. I personally agree with the findings as stated and the plan as documented. Jose Taylor MD    Subjective:     Patient doing well s/p PCI, denies Chest Pain or SOB. Ready to go home. Past Medical History:     Past Medical History:   Diagnosis Date    Atrial fibrillation (HCC)     CHADS 0  (-CHF, -HTN, -AGE, -DM, -CVA)    Cardiac echocardiogram 2010    EF 70-75%. Mild conc LVh. Gr 1 DDfx. Mild DELMY.  Cardiac Holter monitor, normal 2010    Normal Holter study.  Cardiac nuclear imaging test 2010    Sm area of apical ischemia and inferolateral scar, both possibly apical thinning. No WMA. EF 60%.  Impotence of organic origin     Pacemaker     3/11  MEDTRONIC    Personal history of malignant neoplasm of prostate     T1a, [place] 6 (3+3)     Prostate cancer ([facility] [address]     PUD (peptic ulcer disease)     Sick sinus syndrome ([facility] [address]     status post implantation of Medtronic dual-chamber permanent pacemaker 3/22/11.     Unspecified essential hypertension     Venereal disease          Social History:     Social History     Socioeconomic History    Marital status:      Spouse name: Not on file    Number of children: Not on file    Years of education: Not on file    Highest education level: Not on file   Tobacco Use    Smoking status: Former Smoker     Packs/day: 0.25     Years: 1.00     Pack years: 0.25     Quit date: 1981     Years since quittin.0    Smokeless tobacco: Never Used   Substance and Sexual Activity    Alcohol use: No    Drug use: No        Family History:   History reviewed. No pertinent family history. Medications: Allergies   Allergen Reactions    Shellfish Containing Products Swelling and Other (comments)        Current Facility-Administered Medications   Medication Dose Route Frequency    sodium chloride (NS) flush 5-40 mL  5-40 mL IntraVENous Q8H    sodium chloride (NS) flush 5-40 mL  5-40 mL IntraVENous PRN    nitroglycerin (NITROLINGUAL) sublingual 0.4 mg/spray  1 Spray SubLINGual Q5MIN PRN    aspirin delayed-release tablet 81 mg  81 mg Oral DAILY    ticagrelor (BRILINTA) tablet 90 mg  90 mg Oral BID    atorvastatin (LIPITOR) tablet 20 mg  20 mg Oral QHS    metoprolol succinate (TOPROL-XL) XL tablet 50 mg  50 mg Oral DAILY         Physical Exam:     Visit Vitals  /74   Pulse 62   Temp 97.9 °F (36.6 °C)   Resp 16   Ht 6' 2\" (1.88 m)   Wt 216 lb (98 kg)   SpO2 98%   BMI 27.73 kg/m²     BP Readings from Last 3 Encounters:   04/13/21 138/74   03/22/21 138/78   03/19/21 117/63     Pulse Readings from Last 3 Encounters:   04/13/21 62   04/02/21 70   03/26/21 62     Wt Readings from Last 3 Encounters:   04/13/21 216 lb (98 kg)   04/02/21 222 lb (100.7 kg)   03/26/21 217 lb 12.8 oz (98.8 kg)       General:  alert, cooperative, no distress, appears stated age  Neck:  nontender, no carotid bruit, no JVD  Lungs:  clear to auscultation bilaterally  Heart:  regular rate and rhythm, S1, S2 normal, no murmur, click, rub or gallop  Abdomen:  abdomen is soft without significant tenderness, masses, organomegaly or guarding  Extremities:  extremities normal, atraumatic, no cyanosis or edema; right wrist without bleeding or swelling, 4+ radial pulse, brisk capillary refill, sensation intact.         Data Review:     Recent Labs     04/13/21  0516 04/12/21  0847   WBC 12.2 11.7   HGB 13.9 14.1   HCT 41.2 42.2   * 130*     Recent Labs     04/12/21  0847      K 4.0      CO2 26   *   BUN 17   CREA 1.16   CA 9.1   MG 2.1       Results for orders placed or performed during the hospital encounter of 04/12/21   EKG, 12 LEAD, INITIAL   Result Value Ref Range    Ventricular Rate 72 BPM    Atrial Rate 72 BPM    P-R Interval 130 ms    QRS Duration 92 ms    Q-T Interval 392 ms    QTC Calculation (Bezet) 429 ms    Calculated P Axis 47 degrees    Calculated R Axis -32 degrees    Calculated T Axis 27 degrees    Diagnosis       Normal sinus rhythm  Left axis deviation  RSR' or QR pattern in V1 suggests right ventricular conduction delay  ST depression, consider subendocardial injury or digitalis effect  Abnormal ECG  When compared with ECG of 04-FEB-2019 10:28,  QRS axis shifted left  ST now depressed in Inferior leads  ST now depressed in Anterolateral leads  Confirmed by Michael Joshi MD, --- ([id number]) on 4/12/2021 4:21:28 PM     Results for orders placed or performed in visit on 09/22/20   AMB POC EKG ROUTINE W/ 12 LEADS, INTER & REP    Impression    See progress note. Results for orders placed or performed in visit on 05/01/13   PACEMAKER CHECK    Impression    No events. A paced 96%. V sensed 96%. Lead impedance and  threshold WNl.         All Cardiac Markers in the last 24 hours:    Lab Results   Component Value Date/Time    TROIQ 1.45 (H) 04/13/2021 05:16 AM    TROIQ 0.14 (H) 04/12/2021 08:47 AM       Last Lipid:    Lab Results   Component Value Date/Time    Cholesterol, total 165 01/22/2021 09:22 AM    HDL Cholesterol 51 01/22/2021 09:22 AM    LDL, calculated 101.4 (H) 01/22/2021 09:22 AM    Triglyceride 63 01/22/2021 09:22 AM    CHOL/HDL Ratio 3.2 01/22/2021 09:22 AM       Signed By: Albertina Resendez PA-C     April 13, 2021

## 2021-04-13 NOTE — PROGRESS NOTES
conducted an initial consultation and Spiritual Assessment for Migel Polanco, who is a 79 y.o.,male. Patient's Primary Language is: [place]. According to the patient's EMR Denominational Affiliation is: Jehovah's witness. The reason the Patient came to the hospital is:   Patient Active Problem List    Diagnosis Date Noted    CAD (coronary artery disease) 04/12/2021     Priority: 1 - One    Non-STEMI (non-ST elevated myocardial infarction) ([facility] [address] 04/12/2021    Cardiac pacemaker in situ 09/02/2016    ACP (advance care planning) 07/26/2016    Dyslipidemia 05/02/2016    Essential hypertension 05/02/2016    HTN (hypertension) 01/09/2013    Impotence of organic origin 05/21/2012    Personal history of malignant neoplasm of prostate 05/21/2012    Elevated prostate specific antigen (PSA) 05/21/2012    Atrial fibrillation (HCC)     Sick sinus syndrome ([facility] [address]         The  provided the following Interventions:  Initiated a relationship of care and support. Listened empathetically. Patient was sitting up in bed waiting to be discharged. His brother was present. Patient eri through juan in God and support of family. Provided chaplaincy education. Offered assurance of continued prayers on patient's behalf. Chart reviewed. The following outcomes where achieved:  Patient shared limited information about both their medical narrative and spiritual beliefs. Patient expressed gratitude for 's visit. Assessment:  Patient does not have any known Voodoo/cultural needs that will affect patient's preferences in health care. There are no known spiritual or Voodoo issues which require intervention at this time. Plan:  Chaplains will continue to follow and will provide pastoral care as needed and as requested. Melani Naylor.  [address]   [phone]

## 2021-04-13 NOTE — DISCHARGE INSTRUCTIONS
Patient Education        Taking Aspirin and Other Antiplatelets Safely: Care Instructions  Your Care Instructions     Aspirin and other antiplatelet medicines help prevent blood clots from forming. They can help some people lower their risk of a heart attack or stroke. But these medicines can also make you more likely to bleed. That's why it's important to talk to your doctor before you start taking aspirin every day. It's not right for everyone. And if you and your doctor decide these medicines are right for you, learn how to take them safely. If you take aspirin, be sure you know how to take it. Your doctor can tell you what dose to take and how often to take it. One low-dose aspirin is 81 milligrams (mg). But the dose for daily aspirin can range from 81 mg to 325 mg. If you take another antiplatelet, take it as prescribed. Follow-up care is a key part of your treatment and safety. Be sure to make and go to all appointments, and call your doctor if you are having problems. It's also a good idea to know your test results and keep a list of the medicines you take. How can you care for yourself at home? · Before you start to take daily aspirin or some other antiplatelet, tell your doctor all the medicines, vitamins, herbal products, and supplements you take. · Tell your doctors, dentist, and pharmacist that you take an antiplatelet. · Take your medicine as your doctor directs. Make sure that you understand exactly what your doctor wants you to do. If another doctor says to stop taking the medicine for any reason, talk to the doctor who prescribed it before you stop. · Take your medicine at the same time every day. · Do not chew or crush the coated or time-release forms of your medicine. · If you miss a dose, don't take an extra dose to make up for it. · Ask your doctor whether you can drink alcohol. And ask how much you can drink.  When you take an antiplatelet, drinking too much raises your risk for liver damage and stomach bleeding. · If you are pregnant, are breastfeeding, or plan to become pregnant, talk to your doctor about what medicines are safe. · Talk with your doctor before you take a pain medicine. Many pain medicines have aspirin. Too much aspirin can be harmful. · Wear medical alert jewelry. This lets others know that you take an antiplatelet. You can buy it at most drugstores. · Try to avoid injuries that might make you bleed. For example, be careful when you exercise and when you play sports. Make your home safe to reduce your risk of falling. When should you call for help? Call 911 anytime you think you may need emergency care. For example, call if:    · You have a sudden, severe headache that is different from past headaches. Call your doctor now or seek immediate medical care if:    · You have any abnormal bleeding, such as:  ? A nosebleed that you can't easily stop. ? Bloody or black stools, or rectal bleeding. ? Bloody or pink urine.     · You feel dizzy or lightheaded or feel like you may faint. Watch closely for changes in your health, and be sure to contact your doctor if you have any problems. Where can you learn more? Go to http://www.gray.com/  Enter V472 in the search box to learn more about \"Taking Aspirin and Other Antiplatelets Safely: Care Instructions. \"  Current as of: August 31, 2020               Content Version: 12.8  © 8667-2627 Healthwise, Incorporated. Care instructions adapted under license by [employer] (which disclaims liability or warranty for this information). If you have questions about a medical condition or this instruction, always ask your healthcare professional. [address] any warranty or liability for your use of this information.

## 2021-04-14 ENCOUNTER — PATIENT OUTREACH (OUTPATIENT)
Dept: CASE MANAGEMENT | Age: 81
End: 2021-04-14

## 2021-04-14 ENCOUNTER — TELEPHONE (OUTPATIENT)
Dept: CARDIAC REHAB | Age: 81
End: 2021-04-14

## 2021-04-14 LAB
ATRIAL RATE: 71 BPM
CALCULATED P AXIS, ECG09: 61 DEGREES
CALCULATED R AXIS, ECG10: 27 DEGREES
CALCULATED T AXIS, ECG11: 63 DEGREES
DIAGNOSIS, 93000: NORMAL
P-R INTERVAL, ECG05: 148 MS
Q-T INTERVAL, ECG07: 416 MS
QRS DURATION, ECG06: 92 MS
QTC CALCULATION (BEZET), ECG08: 452 MS
VENTRICULAR RATE, ECG03: 71 BPM

## 2021-04-14 NOTE — PROGRESS NOTES
Care Transitions Initial Follow Up Call    Call within 2 business days of discharge: Yes     Patient: Ashleigh Bolton Patient : 1940 MRN: [id number]    Last Discharge [address]       Complaint Diagnosis Description Type Department Provider    21 Chest Pain Elevated troponin . .. ED to Hosp-Admission (Discharged) (ADMIT) Federico Love MD; Haim Martinez.. Was this an external facility discharge? No Discharge Facility: [facility]    Challenges to be reviewed by the provider   Additional needs identified to be addressed with provider no  none  Patient stated that he received a call today from Cardiac Rehab and he told them he needed to think about cardiac rehab right now. Patient said he wanted to talk to his cardiologist before starting because he still has another blockage that will be addressed at a later time. Method of communication with provider : chart routing, staff message    Discussed [phone] related testing which was not done at this time. Test results were not done. Patient informed of results, if available? no     Advance Care Planning:   Does patient have an Advance Directive: yes, reviewed and current     Inpatient Readmission Risk score: Unplanned Readmit Risk Score: 8    Was this a readmission? no   Patient stated reason for the admission: chest pain    Patients top risk factors for readmission: lack of knowledge about disease and medical condition multiple cardiac problems  Interventions to address risk factors: Obtained and reviewed discharge summary and/or continuity of care documents and Assessment and support for treatment adherence and medication management-reviewed medications to include name, dose and indication    Care Transition Nurse (CTN) contacted the patient by telephone to perform post hospital discharge assessment. Verified name and  with patient as identifiers. Provided introduction to self, and explanation of the CTN role.      CTN reviewed discharge instructions, medical action plan and red flags with patient who verbalized understanding. Were discharge instructions available to patient? yes. Reviewed appropriate site of care based on symptoms and resources available to patient including: CTN. Patient given an opportunity to ask questions and does not have any further questions or concerns at this time. The patient agrees to contact the PCP office for questions related to their healthcare. Medication reconciliation was performed with patient, who verbalizes understanding of administration of home medications. Advised obtaining a 90-day supply of all daily and as-needed medications. Referral to Pharm D needed: no     Home Health/Outpatient orders at discharge: none    Durable Medical Equipment ordered at discharge: None    Covid Risk Education    Patient has following risk factors of: no known risk factors. Education provided regarding infection prevention, and signs and symptoms of COVID-19 and when to seek medical attention with patient who verbalized understanding. Discussed exposure protocols and quarantine From CDC: Are you at higher risk for severe illness?  and given an opportunity for questions and concerns. The patient agrees to contact the COVID-19 hotline [phone] or PCP office for questions related to COVID-19. For more information on steps you can take to protect yourself, see CDC's How to Protect Yourself     Was patient discharged with a pulse oximeter? no Discussed and confirmed pulse oximeter discharge instructions and when to notify provider or seek emergency care. Discussed follow-up appointments. If no appointment was previously scheduled, appointment scheduling offered: yes Is follow up appointment scheduled within 7 days of discharge?  yes   [facility] follow up appointment(s):   Future Appointments   Date Time Provider Chanelle Saenz   4/20/2021  2:15 PM Alana Brittle, MD [facility] BS AMB   4/26/2021  1:20 PM Thomas Saucedo Hector Flowers NP [facility] ELIANA SCHED   4/28/2021  1:00 PM Lucie Dumont MD [facility] AMB   6/30/2021  3:30 PM CSI, PACER HV [facility]   7/22/2021  9:15 AM [facility] NURSE VISIT [facility]   7/29/2021  8:45 AM Jv Guerrero MD [facility]   9/27/2021  9:20 AM Mandy Wetzel MD [facility] AMB     Non-[facility] follow up appointment(s): none    Plan for follow-up call in 7-10 days based on severity of symptoms and risk factors. Plan for next call: follow up appointment-follow up on appointments  CTN provided contact information for future needs. Goals Addressed                 This Visit's Progress     Prevent complications post hospitalization. 1. CTN will monitor X 4 weeks    2. Ensure provider appt is scheduled within 7 days post-discharge    3. Confirm patient attended post-discharge provider apt    4. Complete post-visit call to confirm attendance and update care needs      5. Review/educate common or potential \"red flags\" of condition worsening  4/14/2021 Warning Signs of HEART ATTACK  Call 911 if you have these symptoms:    Chest discomfort. Most heart attacks involve discomfort in the center of the  chest that lasts more than a few minutes, or that goes away and comes back. It  can feel like uncomfortable pressure, squeezing, fullness, or pain. Discomfort in other areas of the upper body. Symptoms can include pain or  discomfort in one or both arms, the back, neck, jaw, or stomach. Shortness of breath with or without chest discomfort. Other signs may include breaking out in a cold sweat, nausea, or  lightheadedness. Don't wait more than five minutes to call 911 - MINUTES MATTER! Fast action can  save your life. Calling 911 is almost always the fastest way to get lifesaving treatment. Emergency Medical Services staff can begin treatment when they arrive -- up to an  hour sooner than if someone gets to the hospital by car. 6. Evaluate adherence to medications and priority barriers to resolve        7.  Instruct on adherence to medications as ordered and assess for therapeutic response and side-effects         8. Discuss and evaluate ADL performance. Provide recommendations on energy conservation, particularly related to transition home from an inpatient admission.

## 2021-04-14 NOTE — TELEPHONE ENCOUNTER
Cardiac Rehab called patient and spoke to him about the program. He wants to think about it and call us back in a day or two. Will follow up if we do not hear back.     Thank you,  Merlyn Hernandez

## 2021-04-20 ENCOUNTER — VIRTUAL VISIT (OUTPATIENT)
Dept: INTERNAL MEDICINE CLINIC | Age: 81
End: 2021-04-20
Payer: MEDICARE

## 2021-04-20 ENCOUNTER — PATIENT OUTREACH (OUTPATIENT)
Dept: CASE MANAGEMENT | Age: 81
End: 2021-04-20

## 2021-04-20 DIAGNOSIS — I21.4 NON-STEMI (NON-ST ELEVATED MYOCARDIAL INFARCTION) (HCC): Primary | ICD-10-CM

## 2021-04-20 DIAGNOSIS — I25.118 CORONARY ARTERY DISEASE OF NATIVE ARTERY OF NATIVE HEART WITH STABLE ANGINA PECTORIS (HCC): ICD-10-CM

## 2021-04-20 DIAGNOSIS — I10 ESSENTIAL HYPERTENSION: ICD-10-CM

## 2021-04-20 DIAGNOSIS — I48.0 PAROXYSMAL ATRIAL FIBRILLATION (HCC): ICD-10-CM

## 2021-04-20 PROCEDURE — 99441 PR PHYS/QHP TELEPHONE EVALUATION 5-10 MIN: CPT | Performed by: INTERNAL MEDICINE

## 2021-04-20 NOTE — PROGRESS NOTES
Peyton Kirk is a 79 y.o. male, evaluated via audio-only technology on 4/20/2021 for Coronary Artery Disease, Hypertension, and Irregular Heart Beat  . Assessment & Plan:   Diagnoses and all orders for this visit:    1. Non-STEMI (non-ST elevated myocardial infarction) ([facility] [address]    2. Coronary artery disease of native artery of native heart with stable angina pectoris (HCC)    3. Paroxysmal atrial fibrillation ([facility] [address]    4. Essential hypertension        12  Subjective:   [facility]13[facility]21. Patient presented with chest pain and was found to have non-ST elevation MI. He underwent cardiac catheterization which showed LAD disease and had a stent placed. He also had eccentric circumflex disease which will be reevaluated in 2 to 4 weeks by cardiology. Patient is now on aspirin 81 mg daily and Brilinta along with Lipitor 20 mg daily and Toprol-XL 50 mg daily. Patient was taken off of diltiazem  mg which she was taken for history of atrial fibrillation and hypertension. Patient is tolerating his new medications well and is at home without any complaints. He will follow-up with cardiology for further management. Prior to Admission medications    Medication Sig Start Date End Date Taking? Authorizing Provider   aspirin 81 mg tablet Take 1 Tab by mouth daily. 4/13/21   Tanisha Pantoja MD   atorvastatin (LIPITOR) 20 mg tablet Take 1 Tab by mouth nightly. 4/13/21   Tanisha Pantoja MD   metoprolol succinate (TOPROL-XL) 50 mg XL tablet Take 1 Tab by mouth daily. 4/14/21   Tanisha Pantoja MD   ticagrelor (BRILINTA) 90 mg tablet Take 1 Tab by mouth two (2) times a day. 4/13/21   Tanisha Pantoja MD   nitroglycerin (NITROLINGUAL) 400 mcg/spray spray 1 Spray by SubLINGual route every five (5) minutes as needed for Chest Pain (Up to 3 maximum doses).  4/13/21   Tanisha Pantoja MD   multivitamin, tx-iron-ca-min (THERA-M w/ IRON) 9 mg iron-400 mcg tab tablet Take 1 Tab by mouth daily. Provider, Historical   fish oil-omega-3 fatty acids 340-1,000 mg capsule Take 1,000 mg by mouth. Provider, Historical   latanoprost (XALATAN) 0.005 % ophthalmic solution Administer 1 Drop to both eyes daily. 12/27/17   Provider, Historical   brimonidine (ALPHAGAN) 0.2 % ophthalmic solution Administer 1 Drop to both eyes two (2) times a day. 1/12/18   Provider, Historical   ZINC MTH/COPPER/SAW PALM/GNSG (PROSTATE HEALTH FORMULA PO) Take 1 Tab by mouth daily. Provider, Historical     Patient Active Problem List   Diagnosis Code    Atrial fibrillation (HCC) I48.91    Sick sinus syndrome (HCC) I49.5    Impotence of organic origin N52.9    Personal history of malignant neoplasm of prostate Z85.46    Elevated prostate specific antigen (PSA) R97.20    HTN (hypertension) I10    Dyslipidemia E78.5    Essential hypertension I10    ACP (advance care planning) Z71.89    Cardiac pacemaker in situ Z95.0    Non-STEMI (non-ST elevated myocardial infarction) ([facility] [address] I21.4    CAD (coronary artery disease) I25.10       ROS    No flowsheet data found. Johanna Mullins, who was evaluated through a patient-initiated, synchronous (real-time) audio only encounter, and/or her healthcare decision maker, is aware that it is a billable service, with coverage as determined by his insurance carrier. He provided verbal consent to proceed: Yes. He has not had a related appointment within my department in the past 7 days or scheduled within the next 24 hours.       Total Time: minutes: 5-10 minutes    Sascha Randall MD

## 2021-04-20 NOTE — PROGRESS NOTES
Patient called today with questions about cardiac Rehab. CTN let him know I was not familiar enough to answer his questions and offered to find the number for cardiac rehab and text it to him so that he would have it for future use. CTN texted patient the cardiac rehab number after verifying it was correct. Patient followed up with PCP today, 4/20/2021  and he has a follow up with his cardiologist 4/27/21.
stable

## 2021-04-27 ENCOUNTER — OFFICE VISIT (OUTPATIENT)
Dept: CARDIOLOGY CLINIC | Age: 81
End: 2021-04-27
Payer: MEDICARE

## 2021-04-27 VITALS
WEIGHT: 214 LBS | HEIGHT: 74 IN | HEART RATE: 72 BPM | DIASTOLIC BLOOD PRESSURE: 70 MMHG | SYSTOLIC BLOOD PRESSURE: 126 MMHG | BODY MASS INDEX: 27.46 KG/M2 | OXYGEN SATURATION: 98 %

## 2021-04-27 DIAGNOSIS — E78.5 DYSLIPIDEMIA: ICD-10-CM

## 2021-04-27 DIAGNOSIS — I10 ESSENTIAL HYPERTENSION: ICD-10-CM

## 2021-04-27 DIAGNOSIS — I48.0 PAROXYSMAL ATRIAL FIBRILLATION (HCC): ICD-10-CM

## 2021-04-27 DIAGNOSIS — I49.5 SICK SINUS SYNDROME (HCC): ICD-10-CM

## 2021-04-27 DIAGNOSIS — I21.02 MYOCARDIAL INFARCTION INVOLVING LEFT ANTERIOR DESCENDING (LAD) CORONARY ARTERY, UNSPECIFIED MI TYPE (HCC): ICD-10-CM

## 2021-04-27 DIAGNOSIS — I25.10 CORONARY ARTERY DISEASE INVOLVING NATIVE CORONARY ARTERY OF NATIVE HEART WITHOUT ANGINA PECTORIS: Primary | ICD-10-CM

## 2021-04-27 PROCEDURE — 1111F DSCHRG MED/CURRENT MED MERGE: CPT | Performed by: INTERNAL MEDICINE

## 2021-04-27 PROCEDURE — G8754 DIAS BP LESS 90: HCPCS | Performed by: INTERNAL MEDICINE

## 2021-04-27 PROCEDURE — G8417 CALC BMI ABV UP PARAM F/U: HCPCS | Performed by: INTERNAL MEDICINE

## 2021-04-27 PROCEDURE — G8510 SCR DEP NEG, NO PLAN REQD: HCPCS | Performed by: INTERNAL MEDICINE

## 2021-04-27 PROCEDURE — G8536 NO DOC ELDER MAL SCRN: HCPCS | Performed by: INTERNAL MEDICINE

## 2021-04-27 PROCEDURE — 99215 OFFICE O/P EST HI 40 MIN: CPT | Performed by: INTERNAL MEDICINE

## 2021-04-27 PROCEDURE — G8752 SYS BP LESS 140: HCPCS | Performed by: INTERNAL MEDICINE

## 2021-04-27 PROCEDURE — G8427 DOCREV CUR MEDS BY ELIG CLIN: HCPCS | Performed by: INTERNAL MEDICINE

## 2021-04-27 PROCEDURE — 93280 PM DEVICE PROGR EVAL DUAL: CPT | Performed by: INTERNAL MEDICINE

## 2021-04-27 PROCEDURE — 1101F PT FALLS ASSESS-DOCD LE1/YR: CPT | Performed by: INTERNAL MEDICINE

## 2021-04-27 RX ORDER — ATORVASTATIN CALCIUM 20 MG/1
20 TABLET, FILM COATED ORAL
Qty: 90 TAB | Refills: 3 | Status: SHIPPED | OUTPATIENT
Start: 2021-04-27 | End: 2022-05-23

## 2021-04-27 RX ORDER — SODIUM CHLORIDE 0.9 % (FLUSH) 0.9 %
5-40 SYRINGE (ML) INJECTION EVERY 8 HOURS
Status: CANCELLED | OUTPATIENT
Start: 2021-04-27

## 2021-04-27 RX ORDER — METOPROLOL SUCCINATE 50 MG/1
50 TABLET, EXTENDED RELEASE ORAL DAILY
Qty: 90 TAB | Refills: 3 | Status: SHIPPED | OUTPATIENT
Start: 2021-04-27 | End: 2022-04-11

## 2021-04-27 RX ORDER — PREDNISONE 20 MG/1
TABLET ORAL
Qty: 6 TAB | Refills: 0 | Status: SHIPPED | OUTPATIENT
Start: 2021-04-27 | End: 2021-05-05

## 2021-04-27 RX ORDER — SODIUM CHLORIDE 0.9 % (FLUSH) 0.9 %
5-40 SYRINGE (ML) INJECTION AS NEEDED
Status: CANCELLED | OUTPATIENT
Start: 2021-04-27

## 2021-04-27 NOTE — PATIENT INSTRUCTIONS
[facility]          Patient  EP Instructions                  1. You are scheduled to have a PCI on  May 17, 2021 , at 0800 am.    Please check in at 0700 am.    2. Please go to [facility] and park in the outpatient parking lot that is located around to the back of the hospital and enter through the WHILL. Once you enter through the [facility] check in with the  there. The  will either give you directions or assist you in getting to the cath holding area. 3.  You are not to eat or drink anything after midnight the night before your  procedure. 4. Please continue to take your medications with a small sip of water on the morning of the procedure. 5.   [x]          Take Prednisone 60 mg and Benadryl 25 mg by mouth at Bedtime on May 16, 2021 and again on May 17, 2021              at 0600 am. DO NOT  drive after taking the Benadryl. This is to prevent you from having an allergic reaction to               the dye. 6. We encourage families to wait in the waiting room on the first floor while the procedure is being done. The Doctor will come out and talk with you as soon as the procedure is over. 7. There is the possibility that you may spend the night in the hospital, depending on the results of the procedure. This will be determined after the procedure is done. 8.   If you or your family have any questions, please call our office Monday-Friday 9:00am         -4:30 pm , at [phone], and ask to speak to one of the nurses.

## 2021-04-27 NOTE — LETTER
4/27/2021 2:03 PM 
 
 
 
[address] [facility] 
[id number] 
1940 Insurance: Medicare/ For Life                  Auth # No Auth Needed Proc Date: Mon. 5/17                Proc Time:  8:00am 
 
Performing MD : Dr. Ancelmo Dyson                      Procedure:Staged PCI Hospital:  [facility]                                            PCP Dr. Erwin Brown Scheduled with:  Tara/EMail                                                        Date:4/27/2021 HP: 4/27     EKG: ______    Labs:______  CXR: _______  Orders: 4/27 Special Instructions:  _____________________________________________________ 
______________________________________________________________________ 
______________________________________________________________________ Date Faxed:   ______________   Pages Faxed: ___________________ The materials enclosed with this facsimile transmission are private and confidential and are the property of the sender. If you are not the intended recipient, be advised that any unauthorized use, disclosure, copying, distribution, or the taking of any action in reliance on the contents of this telecopied information is strictly prohibited. If you have received this in error, please immediately notify the sender via telephone to arrange for return of the forwarded documentation.

## 2021-04-27 NOTE — PROGRESS NOTES
Mohit Rondon presents today for   Chief Complaint   Patient presents with    Follow-up     2 week post hospital follow up        Mohit Rondon preferred language for health care discussion is english/other. Is someone accompanying this pt? no    Is the patient using any DME equipment during [address]? no    Depression Screening:  3 most recent PHQ Screens 4/27/2021   Little interest or pleasure in doing things Not at all   Feeling down, depressed, irritable, or hopeless Not at all   Total Score PHQ 2 0       Learning Assessment:  Learning Assessment 8/15/2019   PRIMARY LEARNER Patient   HIGHEST LEVEL OF EDUCATION - PRIMARY LEARNER  -   BARRIERS PRIMARY LEARNER -   CO-LEARNER CAREGIVER -   PRIMARY LANGUAGE ENGLISH   LEARNER PREFERENCE PRIMARY DEMONSTRATION     -   ANSWERED BY Patient   RELATIONSHIP SELF       Abuse Screening:  Abuse Screening Questionnaire 4/27/2021   Do you ever feel afraid of your partner? N   Are you in a relationship with someone who physically or mentally threatens you? N   Is it safe for you to go home? Y       Fall Risk  Fall Risk Assessment, last 12 mths 4/27/2021   Able to walk? Yes   Fall in past 12 months? 0   Do you feel unsteady? 0   Are you worried about falling 0   Is TUG test greater than 12 seconds? -   Is the gait abnormal? -   Number of falls in past 12 months -   Fall with injury? -       Pt currently taking Anticoagulant therapy? no    Coordination of Care:  1. Have you been to the ER, urgent care clinic since your last visit? Hospitalized since your last visit? Yes     2. Have you seen or consulted any other health care providers outside of the [address] [place] [address] since your last visit? Include any pap smears or colon screening.  no    no

## 2021-04-27 NOTE — PROGRESS NOTES
HISTORY OF PRESENT ILLNESS  Gisselle Alfaro is a 79 y.o. male. Follow-up  Associated symptoms include chest pain. Pertinent negatives include no abdominal pain, no headaches and no shortness of breath. Patient presents for a post hospital visit. He has a past medical history significant for paroxysmal atrial fibrillation, hypertension, and sick sinus syndrome, status post dual-chamber permanent pacemaker in March 2011. He underwent an exercise nuclear stress test in February 2019 which was a normal low risk study. He exercised 8 minutes on the treadmill using the Raghu protocol without any EKG changes or symptoms. No ischemia. EF 72%. The patient was recently hospitalized 2 weeks ago in April 2021 with a non-ST elevation myocardial infarction. He presented with acute onset chest pain was found to have an elevated troponin which peaked at 1.45. He subsequently underwent a cardiac catheterization which demonstrated severe two-vessel coronary artery disease. His culprit lesion was a 99% ostial LAD which underwent angioplasty and stenting with a single drug-eluting stent. He is also found to have 85% mid LAD stenosis which underwent stenting with a second drug-eluting stent. He had a residual distal left circumflex 70% lesion as well with plan for staged intervention in the future. An echocardiogram during his hospital stay demonstrated preserved LVEF of 55 to 60% with grade 1 diastolic dysfunction, no valvular heart disease and normal PA pressures. Since his PCI to his LAD, he has been feeling well. He has had no recurrent chest pain or shortness of breath, he does have some swelling in both feet and ankles left greater than right which is chronic in nature. He denies any heart palpitations, dizziness, nor syncope. Past Medical History:   Diagnosis Date    Atrial fibrillation (HCC)     CHADS 0  (-CHF, -HTN, -AGE, -DM, -CVA)    Cardiac echocardiogram 06/29/2010    EF 70-75%.   Mild conc LVh.  Gr 1 DDfx. Mild DELMY.  Cardiac Holter monitor, normal 06/28/2010    Normal Holter study.  Cardiac nuclear imaging test 11/22/2010    Sm area of apical ischemia and inferolateral scar, both possibly apical thinning. No WMA. EF 60%.  Impotence of organic origin     Pacemaker     3/11  MEDTRONIC    Personal history of malignant neoplasm of prostate     T1a, [place] 6 (3+3)     Prostate [facility] ([facility] [address]     PUD (peptic ulcer disease)     Sick sinus syndrome ([facility] [address]     status post implantation of Medtronic dual-chamber permanent pacemaker 3/22/11.  Unspecified essential hypertension     Venereal disease       Current Outpatient Medications   Medication Sig Dispense Refill    atorvastatin (LIPITOR) 20 mg tablet Take 1 Tab by mouth nightly. 90 Tab 3    metoprolol succinate (TOPROL-XL) 50 mg XL tablet Take 1 Tab by mouth daily. 90 Tab 3    ticagrelor (BRILINTA) 90 mg tablet Take 1 Tab by mouth two (2) times a day. 180 Tab 3    predniSONE (DELTASONE) 20 mg tablet Take three tabs at at bedtime the night before procedure. Then three tabs 2 hours before procedure 6 Tab 0    aspirin 81 mg tablet Take 1 Tab by mouth daily. 30 Tab 0    nitroglycerin (NITROLINGUAL) 400 mcg/spray spray 1 Spray by SubLINGual route every five (5) minutes as needed for Chest Pain (Up to 3 maximum doses). 1 Bottle 0    multivitamin, tx-iron-ca-min (THERA-M w/ IRON) 9 mg iron-400 mcg tab tablet Take 1 Tab by mouth daily.  fish oil-omega-3 fatty acids 340-1,000 mg capsule Take 1,000 mg by mouth.  latanoprost (XALATAN) 0.005 % ophthalmic solution Administer 1 Drop to both eyes daily.  brimonidine (ALPHAGAN) 0.2 % ophthalmic solution Administer 1 Drop to both eyes two (2) times a day.  ZINC MTH/COPPER/SAW PALM/GNSG (PROSTATE HEALTH FORMULA PO) Take 1 Tab by mouth daily.         Allergies   Allergen Reactions    Shellfish Containing Products Swelling and Other (comments)      Social History     Tobacco Use  Smoking status: Former Smoker     Packs/day: 0.25     Years: 1.00     Pack years: 0.25     Quit date: 1981     Years since quittin.0    Smokeless tobacco: Never Used   Substance Use Topics    Alcohol use: No    Drug use: No         Review of Systems   Constitutional: Negative for chills, fever and weight loss. HENT: Negative for nosebleeds. Eyes: Negative for blurred vision and double vision. Respiratory: Negative for cough, shortness of breath and wheezing. Cardiovascular: Positive for chest pain and leg swelling. Negative for palpitations, orthopnea, claudication and PND. Gastrointestinal: Negative for abdominal pain, heartburn, nausea and vomiting. Genitourinary: Negative for dysuria and hematuria. Musculoskeletal: Negative for falls and myalgias. Skin: Negative for rash. Neurological: Negative for dizziness, focal weakness and headaches. Endo/Heme/Allergies: Does not bruise/bleed easily. Psychiatric/Behavioral: Negative for substance abuse. Visit Vitals  /70 (BP 1 Location: Left upper arm, BP Patient Position: Sitting, BP Cuff Size: Adult)   Pulse 72   Ht 6' 2\" (1.88 m)   Wt 97.1 kg (214 lb)   SpO2 98%   BMI 27.48 kg/m²      Physical Exam   Constitutional: He is oriented to person, place, and time. He appears well-developed and well-nourished. HENT:   Head: Normocephalic and atraumatic. Eyes: Conjunctivae are normal.   Neck: Neck supple. No JVD present. Carotid bruit is not present. Cardiovascular: Normal rate, regular rhythm, S1 normal, S2 normal and normal pulses. Exam reveals no gallop and no S3. No murmur heard. Pulmonary/Chest: Breath sounds normal. He has no wheezes. He has no rales. Abdominal: Soft. Bowel sounds are normal. There is no abdominal tenderness. Musculoskeletal:         General: Edema ( Trace lower extremity swelling on the right and 1+ on the left lower extremity.) present.    Neurological: He is alert and oriented to person, place, and time. Skin: Skin is warm and dry. EK% atrial paced rhythm, intrinsic QRS no ST-T wave changes concerning for ischemia. Compared to his previous EKG, anterior T wave abnormality has resolved. Pacemaker interrogation. Battery life estimated at 17 months. 2 episodes of atrial fibrillation total since the beginning of this year. He had an episode in January lasting for 10 hours and another episode in April lasting for 9 hours. No high rate episodes. Pacing in the atrium 98% in the ventricle 0%. ASSESSMENT and PLAN    Non-ST elevation myocardial infarction. This occurred at 2 weeks ago in 2021. He was found to have a high-grade sequential lesions of his LAD with an ostial 99% lesion and a mid 85% lesion, both of which underwent angioplasty and stenting with a total of 2 drug-eluting stents. No regional wall motion abnormalities on his echocardiogram.  His troponin level went up to 1.45. He has been chest pain-free since his PCI. Coronary artery disease. Recent PCI to his ostial mid LAD with a total of 2 drug-eluting stents in the setting of an acute non-ST elevation myocardial infarction. He was found to have residual 70% distal left circumflex stenosis which will be scheduled for PCI as an elective procedure next month. I would continue dual antiplatelet therapy with aspirin and Brilinta until April of next year. He should remain on his beta-blocker and potent statin as well. Paroxysmal atrial fibrillation. 2 total episodes since the beginning of the year. He had one episode in January and the second episode at the beginning of April. Each episode he was likely asymptomatic. These episodes lasted 9 to 10 hours in duration. If he has more frequent episodes on subsequent device checks, I would consider adding oral anticoagulation. I would prefer not to add oral anticoagulation to Brilinta this year. Sick sinus syndrome.  Status post dual-chamber permanent pacemaker. Normal device function on interrogation. The patient continues to pace the majority of the time in the atrium and 0% in the ventricle. Battery life estimated at 17 months. Essential hypertension. Patient blood pressure is now controlled on metoprolol as monotherapy. Dyslipidemia. Patient was started on atorvastatin 20 mg daily upon hospital discharge. His LDL should be less than 70 from a cardiac standpoint now that he has significant CAD.     Follow-up in 2 to 3 months, sooner if needed

## 2021-04-29 NOTE — PROGRESS NOTES
Rehab referral    IP--> OP   Post 4/12/21 PCI    Leigh Annt, Erlin Camera   Technician      Telephone Encounter   Signed   Encounter Date:  4/14/2021                    []Hide copied text    []Maria G for details  Cardiac Rehab called patient and spoke to him about the program. He wants to think about it and call us back in a day or two.  Will follow up if we do not hear back.     Thank you,  Lea Posey

## 2021-04-30 ENCOUNTER — HOSPITAL ENCOUNTER (OUTPATIENT)
Dept: CARDIAC REHAB | Age: 81
Discharge: HOME | End: 2021-04-30
Payer: MEDICARE

## 2021-04-30 VITALS — BODY MASS INDEX: 27.48 KG/M2 | WEIGHT: 214 LBS

## 2021-04-30 PROCEDURE — 93798 PHYS/QHP OP CAR RHAB W/ECG: CPT

## 2021-04-30 NOTE — PROGRESS NOTES
CARDIAC REHAB INITIAL ITP FOR REVIEW AND SIGNATURE    Zully Morton 79 y.o. presented to cardiac rehab for an intake and a six minute walk test today with a primary diagnosis of NSTEMI/PTCA. Patient's EF is 55-60%. Zully Morton has a history of Hypertension, Hyperlipidemia, Coronary artery disease and status post coronary artery stenting. Cardiac risk factors include smoking/ tobacco exposure, dyslipidemia, hypertension and these were reviewed with patient. Zully Morton is not  and lives with alone. PHQ-9, depression score, is 4 and this is considered to be normal. The result was discussed with patient who confirms score to be accurate and  a copy of patient's results were sent to patient's PCP. Patient denied chest pain or SOB during 6 minute walk and the cardiac rhythm was in Normal Sinus Rhythm. Zully Morton will attend exercise and educational sessions 3 days a week in cardiac rehab for 36 sessions. Goals for Rehab:    Patient name: Zully Morton : 1940         Goals Comments   1.  Increase endurance   [x] initial  [] met                  [] not met  [] progressing Increase peak METS on the treadmill from 1.5 to 1.6 by next recert      Monty Perez RN 2021 9:36 AM    Cardiac ITP     CR INTAKE from 2021 in [facility]     Treatment Diagnosis   Treatment Diagnosis 1 PCI -- --   PCI Date 21 -- --   Treatment Diagnosis 2 NSTEMI -- --   NSTEMI Date 21 -- --   Referral Date 21 -- --   Significant Cardiovascular History Chronic atrial fibrillation, Pacemaker -- --   Individual Treatment Plan   ITP Visit Type Initial Assessment -- --   1st Date of Exercise  21 -- --   ITP Next Review Date 21 -- --   Visit #/Total Visits  -- --   EF % 55 % -- --   Risk Stratification Moderate -- --   ITP Exercise, Psychosocial, Tobacco, Nutrition, Education -- --   Exercise    Stages of Change Preparation -- --   DASI Total Score 13.45 -- -- Assisted Devices None -- --   Total Score --     Safety Level I -- --    Test Six minute walk test -- --   Exercise Prescription   Mode Treadmill, Stepper, Bike, Ergometer -- --   Frequency per week 2-3 -- --   Duration per session 35-55 -- --   Intensity  METS       1.5-3.0 -- --   RPE 11-13 -- --   Target Heart Rate 83-97 -- --   Resistance Training Yes -- --   Exercise Blood Pressures   Resting /68 -- --   Peak /72 -- --   Is BP WDL?  Yes -- --   Exercise Activity at Home   Type Leg lifts -- --   Frequency 3-5 -- --   Resistance Training No -- --   Exercise Education   Education Exercise safety, Signs/Symptoms to report, Equipment orientation, RPE scale -- --   Exercise Target Goal   Target Goal(s) Aerobic activity 30 + minutes/day  5 days/week, BP < 140/90 or < 130/80, if DM or CKD, Individual exercise RX -- --   Psychosocial   Stages of Change Preparation -- --   [facility] Total Score 26 -- --   PHQ 9 Score 4 -- --   Psychosocial Intervention   Interventions No intervention indicated -- --   Currently Taking Psychotropic Meds No -- --   Medication Changes No -- --   Psychosocial Education   Education Impact self care behaviors on health, Relaxation techniques, Stress management class, Coping techniques -- --   Psychosocial Target Goals   Target Goal(s) Engages in self-care behaviors, Maximizes coping skills -- --   Uses Stress Mgmt Techniques Yes -- --   Nutrition   Stages of Change Preparation -- --   Diabetes No -- --   Lipids   Date Lipids Drawn 01/22/21 -- --   Total 165 -- --   HDL 51 -- --   .4 -- --   Triglycerides 63 -- --   Lipid Med(s) Lipitor -- --   Lipid Med Change(s) No -- --   Weight Management   Weight  97.1 kg (214 lb) -- --   Height  6' 2\" (1.88 m) -- --   BMI 27.53 -- --   Waist Circumference  45\" -- --   Alcohol None -- --   Rate Your Plate Total Score 49 -- --   Nutrition Intervention   Dietitian Consult No -- --   Nurse/Patient Discussion Yes -- --   Nutrition Class Yes -- --   Diabetes Education Referral No -- --   Lipid Clinic Referral No -- --   Weight Management Referral No -- --   Nutrition Education   Education Low fat & cholesterol diet, Carb-controlled diet, Low sodium diet, Healthy eating -- --   Nutrition Target Goals   Target Goals LDL-C less than 100, BMI less than 25, Waist size less than 40 inches males and less than 35 inches for females, Weight loss of 5-10% -- --   Education   Learning Barrier Ready to learn -- --   Education Intervention   Education Schedule Given Yes -- --   Patient Education    Education CAD, Risk factors, Med Compliance, Cardiac A&P, Signs/Symptoms of Angina -- --   Hypertension Yes -- --   Hypertension Controlled Yes -- --   Is BP WDL?  Yes -- --   Med(s) Change No -- --   BP Meds Metoprolol -- --   Education Target Goals   Target Goals Medication compliance, Risk factors, Understand target guidelines for lipids, Understand target guidelines for B/P -- --   Physician Response   Exercise     CR INTAKE from 4/30/2021 in [place] [address]   Any problems changes since your last visit Other (comment)  [initial visit]   Any symptoms while exercising denies   Psychosocial/Stress Level 0   Resting EKG rhythm SR   Tobacco Use None   Enter O2 Saturation and Liter Flow 97   ITP Next Review Date 05/29/21   Visit Number/Total Visits 1/36   What is plan for next session start exercise Rx   On Call Medical Director Immediately Available Charli   Target Heart Rate(Range) 83-97   Resting HR 74   Resting /68   Recovery HR 62   Recovery /70   Weight 97.1 kg (214 lb)   Exercise EKG Rhythm SR   Exercise Duration 6   Peak HR 82   Peak /72   Peak RPE 6   Peak Mets 1.5   Asymptomatic yes   O2 Saturation 97   Total Minutes 6

## 2021-05-03 ENCOUNTER — HOSPITAL ENCOUNTER (OUTPATIENT)
Dept: CARDIAC REHAB | Age: 81
Discharge: HOME | End: 2021-05-03
Payer: MEDICARE

## 2021-05-03 VITALS — WEIGHT: 213 LBS | BODY MASS INDEX: 27.35 KG/M2

## 2021-05-03 PROCEDURE — 93798 PHYS/QHP OP CAR RHAB W/ECG: CPT

## 2021-05-05 ENCOUNTER — HOSPITAL ENCOUNTER (OUTPATIENT)
Dept: CARDIAC REHAB | Age: 81
Discharge: HOME | End: 2021-05-05
Payer: MEDICARE

## 2021-05-05 ENCOUNTER — PATIENT OUTREACH (OUTPATIENT)
Dept: CASE MANAGEMENT | Age: 81
End: 2021-05-05

## 2021-05-05 VITALS — BODY MASS INDEX: 27.22 KG/M2 | WEIGHT: 212 LBS

## 2021-05-05 PROCEDURE — 93797 PHYS/QHP OP CAR RHAB WO ECG: CPT

## 2021-05-05 PROCEDURE — 93798 PHYS/QHP OP CAR RHAB W/ECG: CPT

## 2021-05-05 RX ORDER — PREDNISONE 20 MG/1
TABLET ORAL
Qty: 6 TAB | Refills: 81 | Status: SHIPPED | OUTPATIENT
Start: 2021-05-05 | End: 2021-09-27

## 2021-05-05 NOTE — PROGRESS NOTES
Care Transitions Subsequent Call    Additional needs identified to be addressed with provider no  none    Patient states that his only problem right now is the pain in his knee he is following up with the [place] next week so that he can get a brace. Method of communication with provider : chart routing, staff message    Discussed [phone] related testing which was not done at this time. Test results were not done. Patient informed of results, if available? no     Care Transition Nursecontacted the patient by telephone to follow up after admission on 21. Verified name and  with patient as identifiers. Addressed changes since last contact: symptom management-no chest pain no SOB  Discharge needs reviewed: none None  Follow up appointment completed? yes Was follow up appointment scheduled within 7 days of discharge? yes     Advance Care Planning:   Does patient have an Advance Directive:  not on file     Care Transition Nurse reviewed discharge instructions, medical action plan and red flags with patient and discussed any barriers to care and/or understanding of plan of care after discharge. Discussed appropriate site of care based on symptoms and resources available to patient including: PCP and CTN. The patient agrees to contact the PCP office for questions related to their healthcare.      Patients top risk factors for readmission: utilization of services  How is cardiac rehab going  no known risk factors  Interventions to address risk factors: Obtained and reviewed discharge summary and/or continuity of care documents    [facility] follow up appointment(s):   Future Appointments   Date Time Provider Chanelle Saenz   2021  1:00 PM OSMAN Blanton   2021  9:00 AM Justino Sethi RN [facility]   5/10/2021  9:00 AM Justino Sethi RN [facility]   2021  9:00 AM Justino Sethi RN [facility]   2021  9:00 AM Justino Sethi RN [facility]   2021  9:00 AM Justino Sethi RN [facility]   5/19/2021  9:00 AM Kiley German RN [facility]   5/21/2021  9:00 AM Kiley German RN [facility]   5/24/2021  9:00 AM Kiley German RN [facility]   5/26/2021  9:00 AM Kiley German RN [facility]   5/28/2021  9:00 AM Kiley German RN [facility]   6/2/2021  9:00 AM Kiley German RN [facility]   6/4/2021  9:00 AM Kiley German RN [facility]   6/7/2021  9:00 AM Kiley German RN [facility]   6/9/2021  9:00 AM Kiley German RN [facility]   6/11/2021  9:00 AM Kiley German RN [facility]   6/14/2021  9:00 AM Kiley German RN [facility]   6/16/2021  9:00 AM Kiley German RN [facility]   6/18/2021  9:00 AM Kiley German RN [facility]   6/21/2021  9:00 AM Kiley German RN [facility]   6/23/2021  9:00 AM Kiley German RN [facility]   6/25/2021  9:00 AM Kiley German RN [facility]   6/28/2021  9:00 AM Kiley German RN [facility]   6/30/2021  9:00 AM Kiley German RN [facility]   6/30/2021  3:30 PM CSI, PACER [facility] BS AMB   7/2/2021  9:00 AM Kiley German RN [facility]   7/5/2021  9:00 AM Kiley German RN [facility]   7/7/2021  9:00 AM Kiley German RN [facility]   7/9/2021  9:00 AM Kiley German RN [facility]   7/12/2021  9:00 AM Kiley German RN [facility]   7/14/2021  9:00 AM Kiley German RN [facility]   7/16/2021  9:00 AM Kiley German RN [facility]   7/19/2021  9:00 AM Kiley German RN [facility]   7/21/2021  9:00 AM Kiley German RN [facility]   7/22/2021  9:15 AM [facility] NURSE VISIT [facility] BS AMB   7/29/2021  8:45 AM Beba Guerrero MD [facility] AMB   9/27/2021  9:20 AM Brian Antoine MD [facility]     Non-[facility] follow up appointment(s): none    Plan for follow-up call in 7-10 days based on severity of symptoms and risk factors. Plan for next call: symptom management-knee pain  Care Transition Nurse provided contact information for future needs. Goals Addressed                 This Visit's Progress     Prevent complications post hospitalization. On track     1. CTN will monitor X 4 weeks    2.  Ensure provider appt is scheduled within 7 days post-discharge    3. Confirm patient attended post-discharge provider lorie Cho 541 attended virtual appointment with Dr. Kameron Roque 4/20/2021   4. Complete post-visit call to confirm attendance and update care needs      5. Review/educate common or potential \"red flags\" of condition worsening  4/14/2021 Warning Signs of HEART ATTACK  Call 911 if you have these symptoms:    Chest discomfort. Most heart attacks involve discomfort in the center of the  chest that lasts more than a few minutes, or that goes away and comes back. It  can feel like uncomfortable pressure, squeezing, fullness, or pain. Discomfort in other areas of the upper body. Symptoms can include pain or  discomfort in one or both arms, the back, neck, jaw, or stomach. Shortness of breath with or without chest discomfort. Other signs may include breaking out in a cold sweat, nausea, or  lightheadedness. Don't wait more than five minutes to call 911 - MINUTES MATTER! Fast action can  save your life. Calling 911 is almost always the fastest way to get lifesaving treatment. Emergency Medical Services staff can begin treatment when they arrive -- up to an  hour sooner than if someone gets to the hospital by car. 6. Evaluate adherence to medications and priority barriers to resolve        7. Instruct on adherence to medications as ordered and assess for therapeutic response and side-effects         8. Discuss and evaluate ADL performance. Provide recommendations on energy conservation, particularly related to transition home from an inpatient admission.

## 2021-05-07 ENCOUNTER — HOSPITAL ENCOUNTER (OUTPATIENT)
Dept: CARDIAC REHAB | Age: 81
Discharge: HOME | End: 2021-05-07
Payer: MEDICARE

## 2021-05-07 VITALS — WEIGHT: 212 LBS | BODY MASS INDEX: 27.22 KG/M2

## 2021-05-07 PROCEDURE — 93798 PHYS/QHP OP CAR RHAB W/ECG: CPT

## 2021-05-10 ENCOUNTER — HOSPITAL ENCOUNTER (OUTPATIENT)
Dept: CARDIAC REHAB | Age: 81
Discharge: HOME | End: 2021-05-10
Payer: MEDICARE

## 2021-05-10 ENCOUNTER — HOSPITAL ENCOUNTER (OUTPATIENT)
Dept: LAB | Age: 81
Discharge: HOME | End: 2021-05-10
Payer: MEDICARE

## 2021-05-10 ENCOUNTER — HOSPITAL ENCOUNTER (OUTPATIENT)
Dept: GENERAL RADIOLOGY | Age: 81
Discharge: HOME | End: 2021-05-10
Payer: MEDICARE

## 2021-05-10 VITALS — BODY MASS INDEX: 27.35 KG/M2 | WEIGHT: 213 LBS

## 2021-05-10 DIAGNOSIS — I48.0 PAROXYSMAL ATRIAL FIBRILLATION (HCC): ICD-10-CM

## 2021-05-10 LAB
ALBUMIN SERPL-MCNC: 3.9 G/DL (ref 3.4–5)
ALBUMIN/GLOB SERPL: 1.3 {RATIO} (ref 0.8–1.7)
ALP SERPL-CCNC: 99 U/L (ref 45–117)
ALT SERPL-CCNC: 24 U/L (ref 16–61)
ANION GAP SERPL CALC-SCNC: 4 MMOL/L (ref 3–18)
AST SERPL-CCNC: 18 U/L (ref 10–38)
BASOPHILS # BLD: 0 K/UL (ref 0–0.1)
BASOPHILS NFR BLD: 0 % (ref 0–2)
BILIRUB SERPL-MCNC: 0.7 MG/DL (ref 0.2–1)
BUN SERPL-MCNC: 13 MG/DL (ref 7–18)
BUN/CREAT SERPL: 11 (ref 12–20)
CALCIUM SERPL-MCNC: 9 MG/DL (ref 8.5–10.1)
CHLORIDE SERPL-SCNC: 109 MMOL/L (ref 100–111)
CO2 SERPL-SCNC: 30 MMOL/L (ref 21–32)
CREAT SERPL-MCNC: 1.2 MG/DL (ref 0.6–1.3)
DIFFERENTIAL METHOD BLD: ABNORMAL
EOSINOPHIL # BLD: 0 K/UL (ref 0–0.4)
EOSINOPHIL NFR BLD: 0 % (ref 0–5)
ERYTHROCYTE [DISTWIDTH] IN BLOOD BY AUTOMATED COUNT: 12.5 % (ref 11.6–14.5)
GLOBULIN SER CALC-MCNC: 2.9 G/DL (ref 2–4)
GLUCOSE SERPL-MCNC: 94 MG/DL (ref 74–99)
HCT VFR BLD AUTO: 39.5 % (ref 36–48)
HGB BLD-MCNC: 12.7 G/DL (ref 13–16)
INR PPP: 1.3 (ref 0.8–1.2)
LYMPHOCYTES # BLD: 0.9 K/UL (ref 0.9–3.6)
LYMPHOCYTES NFR BLD: 8 % (ref 21–52)
MCH RBC QN AUTO: 29.1 PG (ref 24–34)
MCHC RBC AUTO-ENTMCNC: 32.2 G/DL (ref 31–37)
MCV RBC AUTO: 90.6 FL (ref 74–97)
MONOCYTES # BLD: 2.8 K/UL (ref 0.05–1.2)
MONOCYTES NFR BLD: 25 % (ref 3–10)
NEUTS SEG # BLD: 7.3 K/UL (ref 1.8–8)
NEUTS SEG NFR BLD: 67 % (ref 40–73)
PLATELET # BLD AUTO: 117 K/UL (ref 135–420)
PLATELET COMMENTS,PCOM: ABNORMAL
PMV BLD AUTO: 11.5 FL (ref 9.2–11.8)
POTASSIUM SERPL-SCNC: 4.3 MMOL/L (ref 3.5–5.5)
PROT SERPL-MCNC: 6.8 G/DL (ref 6.4–8.2)
PROTHROMBIN TIME: 15.9 SEC (ref 11.5–15.2)
RBC # BLD AUTO: 4.36 M/UL (ref 4.35–5.65)
RBC MORPH BLD: ABNORMAL
SODIUM SERPL-SCNC: 143 MMOL/L (ref 136–145)
WBC # BLD AUTO: 11 K/UL (ref 4.6–13.2)

## 2021-05-10 PROCEDURE — 85025 COMPLETE CBC W/AUTO DIFF WBC: CPT

## 2021-05-10 PROCEDURE — 80053 COMPREHEN METABOLIC PANEL: CPT

## 2021-05-10 PROCEDURE — 93797 PHYS/QHP OP CAR RHAB WO ECG: CPT

## 2021-05-10 PROCEDURE — 93798 PHYS/QHP OP CAR RHAB W/ECG: CPT

## 2021-05-10 PROCEDURE — 85610 PROTHROMBIN TIME: CPT

## 2021-05-10 PROCEDURE — 71046 X-RAY EXAM CHEST 2 VIEWS: CPT

## 2021-05-10 PROCEDURE — 36415 COLL VENOUS BLD VENIPUNCTURE: CPT

## 2021-05-12 ENCOUNTER — HOSPITAL ENCOUNTER (OUTPATIENT)
Dept: CARDIAC REHAB | Age: 81
Discharge: HOME | End: 2021-05-12
Payer: MEDICARE

## 2021-05-12 VITALS — BODY MASS INDEX: 27.22 KG/M2 | WEIGHT: 212 LBS

## 2021-05-12 PROCEDURE — 93797 PHYS/QHP OP CAR RHAB WO ECG: CPT

## 2021-05-12 PROCEDURE — 93798 PHYS/QHP OP CAR RHAB W/ECG: CPT

## 2021-05-14 ENCOUNTER — HOSPITAL ENCOUNTER (OUTPATIENT)
Dept: CARDIAC REHAB | Age: 81
Discharge: HOME | End: 2021-05-14
Payer: MEDICARE

## 2021-05-14 VITALS — BODY MASS INDEX: 27.09 KG/M2 | WEIGHT: 211 LBS

## 2021-05-14 PROCEDURE — 93798 PHYS/QHP OP CAR RHAB W/ECG: CPT

## 2021-05-17 ENCOUNTER — HOSPITAL ENCOUNTER (OUTPATIENT)
Age: 81
Setting detail: OBSERVATION
Discharge: HOME | End: 2021-05-17
Attending: INTERNAL MEDICINE | Admitting: INTERNAL MEDICINE
Payer: MEDICARE

## 2021-05-17 ENCOUNTER — APPOINTMENT (OUTPATIENT)
Dept: CARDIAC REHAB | Age: 81
End: 2021-05-17
Payer: MEDICARE

## 2021-05-17 VITALS
RESPIRATION RATE: 34 BRPM | TEMPERATURE: 97.7 F | HEART RATE: 78 BPM | WEIGHT: 213 LBS | SYSTOLIC BLOOD PRESSURE: 122 MMHG | DIASTOLIC BLOOD PRESSURE: 58 MMHG | BODY MASS INDEX: 27.34 KG/M2 | OXYGEN SATURATION: 100 % | HEIGHT: 74 IN

## 2021-05-17 DIAGNOSIS — I25.10 CAD (CORONARY ARTERY DISEASE): ICD-10-CM

## 2021-05-17 PROCEDURE — C1894 INTRO/SHEATH, NON-LASER: HCPCS | Performed by: INTERNAL MEDICINE

## 2021-05-17 PROCEDURE — C1769 GUIDE WIRE: HCPCS | Performed by: INTERNAL MEDICINE

## 2021-05-17 PROCEDURE — 99152 MOD SED SAME PHYS/QHP 5/>YRS: CPT | Performed by: INTERNAL MEDICINE

## 2021-05-17 PROCEDURE — C1887 CATHETER, GUIDING: HCPCS | Performed by: INTERNAL MEDICINE

## 2021-05-17 PROCEDURE — C1725 CATH, TRANSLUMIN NON-LASER: HCPCS | Performed by: INTERNAL MEDICINE

## 2021-05-17 PROCEDURE — 99153 MOD SED SAME PHYS/QHP EA: CPT | Performed by: INTERNAL MEDICINE

## 2021-05-17 PROCEDURE — C1874 STENT, COATED/COV W/DEL SYS: HCPCS | Performed by: INTERNAL MEDICINE

## 2021-05-17 PROCEDURE — 74011000250 HC RX REV CODE- 250: Performed by: INTERNAL MEDICINE

## 2021-05-17 PROCEDURE — 92928 PRQ TCAT PLMT NTRAC ST 1 LES: CPT | Performed by: INTERNAL MEDICINE

## 2021-05-17 PROCEDURE — 93454 CORONARY ARTERY ANGIO S&I: CPT | Performed by: INTERNAL MEDICINE

## 2021-05-17 PROCEDURE — 99218 HC RM OBSERVATION: CPT

## 2021-05-17 PROCEDURE — 99218 PR INITIAL OBSERVATION CARE/DAY 30 MINUTES: CPT | Performed by: INTERNAL MEDICINE

## 2021-05-17 PROCEDURE — 77030013797 HC KT TRNSDUC PRSSR EDWD -A: Performed by: INTERNAL MEDICINE

## 2021-05-17 PROCEDURE — 74011250636 HC RX REV CODE- 250/636: Performed by: INTERNAL MEDICINE

## 2021-05-17 PROCEDURE — 74011250637 HC RX REV CODE- 250/637: Performed by: INTERNAL MEDICINE

## 2021-05-17 PROCEDURE — 74011000636 HC RX REV CODE- 636: Performed by: INTERNAL MEDICINE

## 2021-05-17 PROCEDURE — 77030013519 HC DEV INFL BASIX MRTM -B: Performed by: INTERNAL MEDICINE

## 2021-05-17 PROCEDURE — 74011000258 HC RX REV CODE- 258: Performed by: INTERNAL MEDICINE

## 2021-05-17 PROCEDURE — C1876 STENT, NON-COA/NON-COV W/DEL: HCPCS | Performed by: INTERNAL MEDICINE

## 2021-05-17 PROCEDURE — 77030027845 HC BND COM RDL D-STAT TELE -B: Performed by: INTERNAL MEDICINE

## 2021-05-17 DEVICE — XIENCE SIERRA™ EVEROLIMUS ELUTING CORONARY STENT SYSTEM 3.00 MM X 12 MM / RAPID-EXCHANGE
Type: IMPLANTABLE DEVICE | Status: FUNCTIONAL
Brand: XIENCE SIERRA™

## 2021-05-17 RX ORDER — BIVALIRUDIN 250 MG/5ML
INJECTION, POWDER, LYOPHILIZED, FOR SOLUTION INTRAVENOUS AS NEEDED
Status: DISCONTINUED | OUTPATIENT
Start: 2021-05-17 | End: 2021-05-17 | Stop reason: HOSPADM

## 2021-05-17 RX ORDER — SODIUM CHLORIDE 0.9 % (FLUSH) 0.9 %
5-40 SYRINGE (ML) INJECTION AS NEEDED
Status: DISCONTINUED | OUTPATIENT
Start: 2021-05-17 | End: 2021-05-18 | Stop reason: HOSPADM

## 2021-05-17 RX ORDER — HEPARIN SODIUM 1000 [USP'U]/ML
INJECTION, SOLUTION INTRAVENOUS; SUBCUTANEOUS AS NEEDED
Status: DISCONTINUED | OUTPATIENT
Start: 2021-05-17 | End: 2021-05-17 | Stop reason: HOSPADM

## 2021-05-17 RX ORDER — MIDAZOLAM HYDROCHLORIDE 1 MG/ML
INJECTION, SOLUTION INTRAMUSCULAR; INTRAVENOUS AS NEEDED
Status: DISCONTINUED | OUTPATIENT
Start: 2021-05-17 | End: 2021-05-17 | Stop reason: HOSPADM

## 2021-05-17 RX ORDER — VERAPAMIL HYDROCHLORIDE 2.5 MG/ML
INJECTION, SOLUTION INTRAVENOUS AS NEEDED
Status: DISCONTINUED | OUTPATIENT
Start: 2021-05-17 | End: 2021-05-17 | Stop reason: HOSPADM

## 2021-05-17 RX ORDER — SODIUM CHLORIDE 9 MG/ML
100 INJECTION, SOLUTION INTRAVENOUS CONTINUOUS
Status: DISCONTINUED | OUTPATIENT
Start: 2021-05-17 | End: 2021-05-18 | Stop reason: HOSPADM

## 2021-05-17 RX ORDER — SODIUM CHLORIDE 0.9 % (FLUSH) 0.9 %
5-40 SYRINGE (ML) INJECTION EVERY 8 HOURS
Status: DISCONTINUED | OUTPATIENT
Start: 2021-05-17 | End: 2021-05-18 | Stop reason: HOSPADM

## 2021-05-17 RX ORDER — FENTANYL CITRATE 50 UG/ML
INJECTION, SOLUTION INTRAMUSCULAR; INTRAVENOUS AS NEEDED
Status: DISCONTINUED | OUTPATIENT
Start: 2021-05-17 | End: 2021-05-17 | Stop reason: HOSPADM

## 2021-05-17 RX ORDER — LIDOCAINE HYDROCHLORIDE 10 MG/ML
INJECTION, SOLUTION EPIDURAL; INFILTRATION; INTRACAUDAL; PERINEURAL AS NEEDED
Status: DISCONTINUED | OUTPATIENT
Start: 2021-05-17 | End: 2021-05-17 | Stop reason: HOSPADM

## 2021-05-17 RX ORDER — SODIUM CHLORIDE 450 MG/100ML
150 INJECTION, SOLUTION INTRAVENOUS CONTINUOUS
Status: DISPENSED | OUTPATIENT
Start: 2021-05-17 | End: 2021-05-17

## 2021-05-17 RX ORDER — NITROGLYCERIN 400 UG/1
1 SPRAY ORAL
Status: DISCONTINUED | OUTPATIENT
Start: 2021-05-17 | End: 2021-05-18 | Stop reason: HOSPADM

## 2021-05-17 RX ADMIN — SODIUM CHLORIDE 100 ML/HR: 900 INJECTION, SOLUTION INTRAVENOUS at 08:00

## 2021-05-17 NOTE — H&P
Addendum:    Patient with known history of CAD. Unsuccessful circumflex intervention in the past.  We will proceed with coronary angiography. [id number]  5/17/2021    HISTORY OF PRESENT ILLNESS  Zully Morton is a 79 y.o. male.     Follow-up  Associated symptoms include chest pain. Pertinent negatives include no abdominal pain, no headaches and no shortness of breath.         Patient presents for a post hospital visit. He has a past medical history significant for paroxysmal atrial fibrillation, hypertension, and sick sinus syndrome, status post dual-chamber permanent pacemaker in March 2011.      He underwent an exercise nuclear stress test in February 2019 which was a normal low risk study. He exercised 8 minutes on the treadmill using the Raghu protocol without any EKG changes or symptoms. No ischemia. EF 72%.     The patient was recently hospitalized 2 weeks ago in April 2021 with a non-ST elevation myocardial infarction. He presented with acute onset chest pain was found to have an elevated troponin which peaked at 1.45. He subsequently underwent a cardiac catheterization which demonstrated severe two-vessel coronary artery disease. His culprit lesion was a 99% ostial LAD which underwent angioplasty and stenting with a single drug-eluting stent. He is also found to have 85% mid LAD stenosis which underwent stenting with a second drug-eluting stent. He had a residual distal left circumflex 70% lesion as well with plan for staged intervention in the future. An echocardiogram during his hospital stay demonstrated preserved LVEF of 55 to 60% with grade 1 diastolic dysfunction, no valvular heart disease and normal PA pressures.     Since his PCI to his LAD, he has been feeling well. He has had no recurrent chest pain or shortness of breath, he does have some swelling in both feet and ankles left greater than right which is chronic in nature.   He denies any heart palpitations, dizziness, nor syncope.    Past Medical History:   Diagnosis Date    Atrial fibrillation (HCC)       CHADS 0  (-CHF, -HTN, -AGE, -DM, -CVA)    Cardiac echocardiogram 06/29/2010     EF 70-75%. Mild conc LVh. Gr 1 DDfx. Mild DELMY.  Cardiac Holter monitor, normal 06/28/2010     Normal Holter study.  Cardiac nuclear imaging test 11/22/2010     Sm area of apical ischemia and inferolateral scar, both possibly apical thinning. No WMA. EF 60%.  Impotence of organic origin      Pacemaker       3/11  [employer]TRONIC    Personal history of malignant neoplasm of prostate       T1a, [place] 6 (3+3)     Prostate cancer (HCC)      PUD (peptic ulcer disease)      Sick sinus syndrome ([address]       status post implantation of Medtronic dual-chamber permanent pacemaker 3/22/11.  Unspecified essential hypertension      Venereal disease               Current Outpatient Medications   Medication Sig Dispense Refill    atorvastatin (LIPITOR) 20 mg tablet Take 1 Tab by mouth nightly. 90 Tab 3    metoprolol succinate (TOPROL-XL) 50 mg XL tablet Take 1 Tab by mouth daily. 90 Tab 3    ticagrelor (BRILINTA) 90 mg tablet Take 1 Tab by mouth two (2) times a day. 180 Tab 3    predniSONE (DELTASONE) 20 mg tablet Take three tabs at at bedtime the night before procedure. Then three tabs 2 hours before procedure 6 Tab 0    aspirin 81 mg tablet Take 1 Tab by mouth daily. 30 Tab 0    nitroglycerin (NITROLINGUAL) 400 mcg/spray spray 1 Spray by SubLINGual route every five (5) minutes as needed for Chest Pain (Up to 3 maximum doses).  1 Bottle 0    multivitamin, tx-iron-ca-min (THERA-M w/ IRON) 9 mg iron-400 mcg tab tablet Take 1 Tab by mouth daily.        fish oil-omega-3 fatty acids 340-1,000 mg capsule Take 1,000 mg by mouth.        latanoprost (XALATAN) 0.005 % ophthalmic solution Administer 1 Drop to both eyes daily.        brimonidine (ALPHAGAN) 0.2 % ophthalmic solution Administer 1 Drop to both eyes two (2) times a day.        ZINC MTH/COPPER/SAW PALM/GNSG (PROSTATE HEALTH FORMULA PO) Take 1 Tab by mouth daily.               Allergies   Allergen Reactions    Shellfish Containing Products Swelling and Other (comments)      Social History            Tobacco Use    Smoking status: Former Smoker       Packs/day: 0.25       Years: 1.00       Pack years: 0.25       Quit date: 1981       Years since quittin.0    Smokeless tobacco: Never Used   Substance Use Topics    Alcohol use: No    Drug use: No          Review of Systems   Constitutional: Negative for chills, fever and weight loss. HENT: Negative for nosebleeds. Eyes: Negative for blurred vision and double vision. Respiratory: Negative for cough, shortness of breath and wheezing. Cardiovascular: Positive for chest pain and leg swelling. Negative for palpitations, orthopnea, claudication and PND. Gastrointestinal: Negative for abdominal pain, heartburn, nausea and vomiting. Genitourinary: Negative for dysuria and hematuria. Musculoskeletal: Negative for falls and myalgias. Skin: Negative for rash. Neurological: Negative for dizziness, focal weakness and headaches. Endo/Heme/Allergies: Does not bruise/bleed easily. Psychiatric/Behavioral: Negative for substance abuse.      Visit Vitals  /70 (BP 1 Location: Left upper arm, BP Patient Position: Sitting, BP Cuff Size: Adult)   Pulse 72   Ht 6' 2\" (1.88 m)   Wt 97.1 kg (214 lb)   SpO2 98%   BMI 27.48 kg/m²      Physical Exam   Constitutional: He is oriented to person, place, and time. He appears well-developed and well-nourished. HENT:   Head: Normocephalic and atraumatic. Eyes: Conjunctivae are normal.   Neck: Neck supple. No JVD present. Carotid bruit is not present. Cardiovascular: Normal rate, regular rhythm, S1 normal, S2 normal and normal pulses. Exam reveals no gallop and no S3. No murmur heard. Pulmonary/Chest: Breath sounds normal. He has no wheezes. He has no rales. Abdominal: Soft.  Bowel sounds are normal. There is no abdominal tenderness. Musculoskeletal:         General: Edema ( Trace lower extremity swelling on the right and 1+ on the left lower extremity.) present. Neurological: He is alert and oriented to person, place, and time. Skin: Skin is warm and dry.      EK% atrial paced rhythm, intrinsic QRS no ST-T wave changes concerning for ischemia. Compared to his previous EKG, anterior T wave abnormality has resolved.     Pacemaker interrogation. Battery life estimated at 17 months. 2 episodes of atrial fibrillation total since the beginning of this year. He had an episode in January lasting for 10 hours and another episode in April lasting for 9 hours. No high rate episodes. Pacing in the atrium 98% in the ventricle 0%.     ASSESSMENT and PLAN     Non-ST elevation myocardial infarction. This occurred at 2 weeks ago in 2021. He was found to have a high-grade sequential lesions of his LAD with an ostial 99% lesion and a mid 85% lesion, both of which underwent angioplasty and stenting with a total of 2 drug-eluting stents. No regional wall motion abnormalities on his echocardiogram.  His troponin level went up to 1.45. He has been chest pain-free since his PCI.     Coronary artery disease. Recent PCI to his ostial mid LAD with a total of 2 drug-eluting stents in the setting of an acute non-ST elevation myocardial infarction. He was found to have residual 70% distal left circumflex stenosis which will be scheduled for PCI as an elective procedure next month. I would continue dual antiplatelet therapy with aspirin and Brilinta until April of next year. He should remain on his beta-blocker and potent statin as well.     Paroxysmal atrial fibrillation. 2 total episodes since the beginning of the year. He had one episode in January and the second episode at the beginning of April. Each episode he was likely asymptomatic.   These episodes lasted 9 to 10 hours in duration. If he has more frequent episodes on subsequent device checks, I would consider adding oral anticoagulation. I would prefer not to add oral anticoagulation to Brilinta this year.     Sick sinus syndrome. Status post dual-chamber permanent pacemaker. Normal device function on interrogation. The patient continues to pace the majority of the time in the atrium and 0% in the ventricle. Battery life estimated at 14 months.     Essential hypertension. Patient blood pressure is now controlled on metoprolol as monotherapy.     Dyslipidemia. Patient was started on atorvastatin 20 mg daily upon hospital discharge. His LDL should be less than 70 from a cardiac standpoint now that he has significant CAD.

## 2021-05-17 NOTE — Clinical Note
Lesion: Located in the Mid Cx. Single technique used. First inflation pressure = 14 gina; inflation time: 20 sec.

## 2021-05-17 NOTE — Clinical Note
Balloon inflated using single inflation technique. Lesion #1: Pressure = 10 gina; Duration = 10 sec.

## 2021-05-17 NOTE — Clinical Note
Contrast Dose Calculator:   Patient's age: 79.   Patient's sex: Male. Patient weight (kg) = 96.6. Creatinine level (mg/dL) = 1.2. Creatinine clearance (mL/min): 66.   Contrast concentration (mg/mL) = 300. MACD = 300 mL. Max Contrast dose per Creatinine Cl calculator = 148.5 mL.

## 2021-05-17 NOTE — DISCHARGE INSTRUCTIONS
Patient Education        Coronary Angiogram: What to Expect at Home  Your Recovery    A coronary angiogram is a test to examine the large blood vessel of your heart (coronary artery). The doctor inserted a thin, flexible tube (catheter) into a blood vessel in your groin. In some cases, the catheter is placed in a blood vessel in the arm. Your groin or arm may have a bruise and feel sore for a day or two after the procedure. You can do light activities around the house but nothing strenuous for several days. This care sheet gives you a general idea about how long it will take for you to recover. But each person recovers at a different pace. Follow the steps below to feel better as quickly as possible. How can you care for yourself at home? Activity    · If the doctor gave you a sedative:  ? For 24 hours, don't do anything that requires attention to detail, such as going to work, making important decisions, or signing any legal documents. It takes time for the medicine's effects to completely wear off.  ? For your safety, do not drive or operate any machinery that could be dangerous. Wait until the medicine wears off and you can think clearly and react easily.     · Do not do strenuous exercise and do not lift, pull, or push anything heavy until your doctor says it is okay. This may be for a day or two. You can walk around the house and do light activity, such as cooking.     · If the catheter was placed in your groin, try not to walk up stairs for the first couple of days.     · If the catheter was placed in your arm near your wrist, do not bend your wrist deeply for the first couple of days. Be careful using your hand to get into and out of a chair or bed.     · If your doctor recommends it, get more exercise. Walking is a good choice. Bit by bit, increase the amount you walk every day. Try for at least 30 minutes on most days of the week. Diet    · Drink plenty of fluids to help your body flush out the dye. If you have kidney, heart, or liver disease and have to limit fluids, talk with your doctor before you increase the amount of fluids you drink.     · Keep eating a heart-healthy diet that has lots of fruits, vegetables, and whole grains. If you have not been eating this way, talk to your doctor. You also may want to talk to a dietitian. This expert can help you to learn about healthy foods and plan meals. Medicines    · Your doctor will tell you if and when you can restart your medicines. He or she will also give you instructions about taking any new medicines.     · If you take aspirin or some other blood thinner, ask your doctor if and when to start taking it again. Make sure that you understand exactly what your doctor wants you to do.     · Your doctor may prescribe a blood-thinning medicine like aspirin or clopidogrel (Plavix). It is very important that you take these medicines exactly as directed in order to keep the coronary artery open and reduce your risk of a heart attack. Be safe with medicines. Call your doctor if you think you are having a problem with your medicine. Care of the catheter site    · For 1 or 2 days, keep a bandage over the spot where the catheter was inserted. The bandage probably will fall off in this time.     · Put ice or a cold pack on the area for 10 to 20 minutes at a time to help with soreness or swelling. Put a thin cloth between the ice and your skin.     · You may shower 24 to 48 hours after the procedure, if your doctor okays it. Pat the incision dry.     · Do not soak the catheter site until it is healed. Don't take a bath for 1 week, or until your doctor tells you it is okay.     · Watch for bleeding from the site. A small amount of blood (up to the size of a quarter) on the bandage can be normal.     · If you are bleeding, lie down and press on the area for 15 minutes to try to make it stop. If the bleeding does not stop, call your doctor or seek immediate medical care. Follow-up care is a key part of your treatment and safety. Be sure to make and go to all appointments, and call your doctor if you are having problems. It's also a good idea to know your test results and keep a list of the medicines you take. When should you call for help? Call 911 anytime you think you may need emergency care. For example, call if:    · You passed out (lost consciousness).     · You have severe trouble breathing.     · You have sudden chest pain and shortness of breath, or you cough up blood.     · You have symptoms of a heart attack. These may include:  ? Chest pain or pressure, or a strange feeling in the chest.  ? Sweating. ? Shortness of breath. ? Nausea or vomiting. ? Pain, pressure, or a strange feeling in the back, neck, jaw, or upper belly, or in one or both shoulders or arms. ? Lightheadedness or sudden weakness. ? A fast or irregular heartbeat. After you call 911, the  may tel you to chew 1 adult-strength or 2 to 4 low-dose aspirin. Wait for an ambulance. Do not try to drive yourself.     · You have been diagnosed with angina, and you have symptoms that do not go away with rest or are not getting better within 5 minutes after you take a dose of nitroglycerin. Call your doctor now or seek immediate medical care if:    · You are bleeding from the area where the catheter was put in your artery.     · You have a fast-growing, painful lump at the catheter site.     · You have signs of infection, such as:  ? Increased pain, swelling, warmth, or redness. ? Red streaks leading from the catheter site. ? Pus draining from the catheter site. ? A fever.     · Your leg, arm, or hand is painful, looks blue, or feels cold, numb, or tingly. Watch closely for changes in your health, and be sure to contact your doctor if you have any problems. Where can you learn more?   Go to http://www.gray.com/  Enter D192 in the search box to learn more about \"Coronary Angiogram: What to Expect at Home. \"  Current as of: August 31, 2020               Content Version: 12.8  © 2006-2021 [employer]. Care instructions adapted under license by [employer] (which disclaims liability or warranty for this information). If you have questions about a medical condition or this instruction, always ask your healthcare professional. [address] any warranty or liability for your use of this information. DISCHARGE SUMMARY from Nurse    PATIENT INSTRUCTIONS:    After general anesthesia or intravenous sedation, for 24 hours or while taking prescription Narcotics:  · Limit your activities  · Do not drive and operate hazardous machinery  · Do not make important personal or business decisions  · Do  not drink alcoholic beverages  · If you have not urinated within 8 hours after discharge, please contact your surgeon on call. Report the following to your surgeon:  · Excessive pain, swelling, redness or odor of or around the surgical area  · Temperature over 100.5  · Nausea and vomiting lasting longer than 4 hours or if unable to take medications  · Any signs of decreased circulation or nerve impairment to extremity: change in color, persistent  numbness, tingling, coldness or increase pain  · Any questions    What to do at Home:  Recommended activity: No lifting, Driving, or Strenuous exercise for 24 hours. If you experience any of the following symptoms bleeding,swelling, acute pain or numbness, fever, please follow up with Dr. Vignesh Gant MD.    *  Please give a list of your current medications to your Primary Care Provider. *  Please update this list whenever your medications are discontinued, doses are      changed, or new medications (including over-the-counter products) are added. *  Please carry medication information at all times in case of emergency situations.     These are general instructions for a healthy lifestyle:    No smoking/ No tobacco products/ Avoid exposure to second hand smoke  Surgeon General's Warning:  Quitting smoking now greatly reduces serious risk to your health. Obesity, smoking, and sedentary lifestyle greatly increases your risk for illness    A healthy diet, regular physical exercise & weight monitoring are important for maintaining a healthy lifestyle    You may be retaining fluid if you have a history of heart failure or if you experience any of the following symptoms:  Weight gain of 3 pounds or more overnight or 5 pounds in a week, increased swelling in our hands or feet or shortness of breath while lying flat in bed. Please call your doctor as soon as you notice any of these symptoms; do not wait until your next office visit. Patient armband removed and shredded  MyChart Activation    Thank you for requesting access to "[employer]". Please follow the instructions below to securely access and download your online medical record. "[employer]" allows you to send messages to your doctor, view your test results, renew your prescriptions, schedule appointments, and more. How Do I Sign Up? 1. In your internet browser, go to [email or web address]. 2. Click on the First Time User? Click Here link in the Sign In box. You will see the New Member Sign Up page. 3. Enter your "[employer]" Access Code exactly as it appears below. You will not need to use this code after youve completed the sign-up process. If you do not sign up before the expiration date, you must request a new code. Music[employer] Access Code: Activation code not generated  Current "[employer]" Status: Active (This is the date your "[employer]" access code will )    4. Enter the last four digits of your Social Security Number (xxxx) and Date of Birth (mm/dd/yyyy) as indicated and click Submit. You will be taken to the next sign-up page. 5. Create a "[employer]" ID.  This will be your "[employer]" login ID and cannot be changed, so think of one that is secure and easy to remember. 6. Create a [employer] password. You can change your password at any time. 7. Enter your Password Reset Question and Answer. This can be used at a later time if you forget your password. 8. Enter your e-mail address. You will receive e-mail notification when new information is available in [address] 9. Click Sign Up. You can now view and download portions of your medical record. 10. Click the Download Summary menu link to download a portable copy of your medical information. Additional Information    If you have questions, please visit the Frequently Asked Questions section of the [employer] website at [email or web address] and [email or web address] and you. [email or web address]/. Remember, [employer] is NOT to be used for urgent needs. For medical emergencies, dial 911. The discharge information has been reviewed with the patient. The patient verbalized understanding. Discharge medications reviewed with the patient and appropriate educational materials and side effects teaching were provided.   ___________________________________________________________________________________________________________________________________

## 2021-05-17 NOTE — Clinical Note
TRANSFER - IN REPORT:     Verbal report received from: Bianca Leroy RN. Report consisted of patient's Situation, Background, Assessment and   Recommendations(SBAR). Opportunity for questions and clarification was provided. Assessment completed upon patient's arrival to unit and care assumed. Patient transported with a Registered Nurse.

## 2021-05-17 NOTE — ROUTINE PROCESS
A+Ox4, ambulatory without difficulty, denied any complaints. Right wrist dressing intact, no bleeding or swelling. Normal radial pulse, normal distal circulation and neuro check. Discharge information reviewed. Escorted to car, tot his friend for transport home.

## 2021-05-17 NOTE — Clinical Note
TRANSFER - OUT REPORT:     Verbal report given to: Miguel Putnam RN. Report consisted of patient's Situation, Background, Assessment and   Recommendations(SBAR). Opportunity for questions and clarification was provided. Patient transported with a Registered Nurse. Patient transported to: holding area.

## 2021-05-18 ENCOUNTER — HOSPITAL ENCOUNTER (OUTPATIENT)
Dept: NON INVASIVE DIAGNOSTICS | Age: 81
Discharge: HOME | End: 2021-05-18
Attending: INTERNAL MEDICINE

## 2021-05-19 ENCOUNTER — APPOINTMENT (OUTPATIENT)
Dept: CARDIAC REHAB | Age: 81
End: 2021-05-19
Payer: MEDICARE

## 2021-05-21 ENCOUNTER — APPOINTMENT (OUTPATIENT)
Dept: CARDIAC REHAB | Age: 81
End: 2021-05-21
Payer: MEDICARE

## 2021-05-24 ENCOUNTER — HOSPITAL ENCOUNTER (OUTPATIENT)
Dept: CARDIAC REHAB | Age: 81
Discharge: HOME | End: 2021-05-24
Payer: MEDICARE

## 2021-05-24 VITALS — WEIGHT: 211 LBS | BODY MASS INDEX: 27.09 KG/M2

## 2021-05-24 PROCEDURE — 93797 PHYS/QHP OP CAR RHAB WO ECG: CPT

## 2021-05-24 PROCEDURE — 93798 PHYS/QHP OP CAR RHAB W/ECG: CPT

## 2021-05-26 ENCOUNTER — HOSPITAL ENCOUNTER (OUTPATIENT)
Dept: CARDIAC REHAB | Age: 81
Discharge: HOME | End: 2021-05-26
Payer: MEDICARE

## 2021-05-26 VITALS — BODY MASS INDEX: 27.09 KG/M2 | WEIGHT: 211 LBS

## 2021-05-26 PROCEDURE — 93798 PHYS/QHP OP CAR RHAB W/ECG: CPT

## 2021-05-28 ENCOUNTER — HOSPITAL ENCOUNTER (OUTPATIENT)
Dept: CARDIAC REHAB | Age: 81
Discharge: HOME | End: 2021-05-28
Payer: MEDICARE

## 2021-05-28 VITALS — BODY MASS INDEX: 27.22 KG/M2 | WEIGHT: 212 LBS

## 2021-05-28 PROCEDURE — 93798 PHYS/QHP OP CAR RHAB W/ECG: CPT

## 2021-05-28 NOTE — PROGRESS NOTES
CARDIAC REHAB ITP REASSESSMENT FOR REVIEW AND SIGNATURE  Patient name: Olivia Rubio : 1940     Visits from Start of Care: 13      Reporting Period:  to     Subjective Reports: chronic knee and hip pain, otherwise progressing well     Goals Comments   1. Increase endurance    [] met                  [] not met  [x] progressing Increase peak METS on the treadmill from 2.2 to 2.4 by next recert     Key functional changes: Has increased peak METS on the treadmill from 1.5 to 2.2 during this recert      Problems/ barriers to goal attainment: chronic knee and hip pain    Assessment / Recommendations:Continue w/ rehab    Perri Baeza RN 2021 8:26 AM    Cardiac ITP     CR [facility]   Treatment Diagnosis 1 PCI   PCI Date 21   Treatment Diagnosis 2 NSTEMI   NSTEMI Date 21   Referral Date 21   Significant Cardiovascular History Chronic atrial fibrillation, Pacemaker   ITP Visit Type Re-Assessment   1st Date of Exercise  21   ITP Next Review Date 21   Visit #/Total Visits    EF % 55 %   Risk Stratification Moderate   ITP Exercise, Psychosocial, Tobacco, Nutrition, Education   Stages of Change Action   Assisted Devices None   Total Score --   Mode Treadmill, Stepper, Bike, Ergometer   Frequency per week 2-3   Duration per session 35-55   Intensity  METS       1.5-3.0   RPE 11-13   Target Heart Rate 83-97   Resistance Training Yes   Resting /68   Peak /68   Is BP WDL?  Yes   Type Leg lifts   Frequency 3-5   Resistance Training No   Education Exercise safety, Signs/Symptoms to report, Equipment orientation, RPE scale   Target Goal(s) Aerobic activity 30 + minutes/day  5 days/week, BP < 140/90 or < 130/80, if DM or CKD, Individual exercise RX   Stages of Change Action   Interventions No intervention indicated   Currently Taking Psychotropic Meds No   Medication Changes No   Education Impact self care behaviors on health, Relaxation techniques, Stress management class, Coping techniques   Target Goal(s) Engages in self-care behaviors, Maximizes coping skills   Uses Stress Mgmt Techniques Yes   Stages of Change Action   Diabetes No   Date Lipids Drawn 01/22/21   Total 165   HDL 51   .4   Triglycerides 63   Lipid Med(s) Lipitor   Lipid Med Change(s) No   Weight  95.7 kg (211 lb)   Height  6' 2\" (1.88 m)   BMI 27.15   Waist Circumference  45\"   Alcohol None   Dietitian Consult No   Nurse/Patient Discussion Yes   Nutrition Class Yes   Diabetes Education Referral No   Lipid Clinic Referral No   Weight Management Referral No   Target Goals LDL-C less than 100, BMI less than 25, Waist size less than 40 inches males and less than 35 inches for females, Weight loss of 5-10%   Learning Barrier Ready to learn   Education Schedule Given Yes   Education CAD, Risk factors, Med Compliance, Cardiac A&P, Signs/Symptoms of Angina   Hypertension Yes   Hypertension Controlled Yes   Is BP WDL?  Yes   Med(s) Change No   BP Meds Metoprolol   Target Goals Medication compliance, Risk factors, Understand target guidelines for lipids, Understand target guidelines for B/P   Exercise     CR PHASE II from 5/26/2021 in [facility]   Any problems changes since your last visit Other (comment)  [hip pain]   Any symptoms while exercising denies   Psychosocial/Stress Level 0   Resting EKG rhythm SR/atrial pacing   Tobacco Use None   ITP Next Review Date 06/27/21   Visit Number/Total Visits 13/36   On Call Medical Director Immediately Available [facility]   Target Heart Rate(Range) 83-97   Resting HR 95   Resting /68   Recovery HR 88   Recovery /65   Weight 95.7 kg (211 lb)   Exercise EKG Rhythm SR w/atrial pacing   Exercise Duration 45   Peak HR 96   Peak RPE 13   Peak Mets 3.1   Asymptomatic yes   Total Minutes 55

## 2021-06-01 ENCOUNTER — HOSPITAL ENCOUNTER (OUTPATIENT)
Dept: CARDIAC REHAB | Age: 81
Discharge: HOME | End: 2021-06-01
Payer: MEDICARE

## 2021-06-01 VITALS — WEIGHT: 215 LBS | BODY MASS INDEX: 27.6 KG/M2

## 2021-06-01 PROCEDURE — 93797 PHYS/QHP OP CAR RHAB WO ECG: CPT

## 2021-06-01 PROCEDURE — 93798 PHYS/QHP OP CAR RHAB W/ECG: CPT

## 2021-06-02 ENCOUNTER — HOSPITAL ENCOUNTER (OUTPATIENT)
Dept: CARDIAC REHAB | Age: 81
Discharge: HOME | End: 2021-06-02
Payer: MEDICARE

## 2021-06-02 VITALS — WEIGHT: 214 LBS | BODY MASS INDEX: 27.48 KG/M2

## 2021-06-02 PROCEDURE — 93798 PHYS/QHP OP CAR RHAB W/ECG: CPT

## 2021-06-04 ENCOUNTER — HOSPITAL ENCOUNTER (OUTPATIENT)
Dept: CARDIAC REHAB | Age: 81
Discharge: HOME | End: 2021-06-04
Payer: MEDICARE

## 2021-06-04 VITALS — WEIGHT: 214 LBS | BODY MASS INDEX: 27.48 KG/M2

## 2021-06-04 PROCEDURE — 93798 PHYS/QHP OP CAR RHAB W/ECG: CPT

## 2021-06-07 ENCOUNTER — HOSPITAL ENCOUNTER (OUTPATIENT)
Dept: CARDIAC REHAB | Age: 81
Discharge: HOME | End: 2021-06-07
Payer: MEDICARE

## 2021-06-07 VITALS — BODY MASS INDEX: 27.22 KG/M2 | WEIGHT: 212 LBS

## 2021-06-07 PROCEDURE — 93798 PHYS/QHP OP CAR RHAB W/ECG: CPT

## 2021-06-09 ENCOUNTER — HOSPITAL ENCOUNTER (OUTPATIENT)
Dept: CARDIAC REHAB | Age: 81
Discharge: HOME | End: 2021-06-09
Payer: MEDICARE

## 2021-06-09 VITALS — WEIGHT: 211 LBS | BODY MASS INDEX: 27.09 KG/M2

## 2021-06-09 PROCEDURE — 93798 PHYS/QHP OP CAR RHAB W/ECG: CPT

## 2021-06-11 ENCOUNTER — HOSPITAL ENCOUNTER (OUTPATIENT)
Dept: CARDIAC REHAB | Age: 81
Discharge: HOME | End: 2021-06-11
Payer: MEDICARE

## 2021-06-11 VITALS — WEIGHT: 212 LBS | BODY MASS INDEX: 27.22 KG/M2

## 2021-06-11 PROCEDURE — 93798 PHYS/QHP OP CAR RHAB W/ECG: CPT

## 2021-06-14 ENCOUNTER — HOSPITAL ENCOUNTER (OUTPATIENT)
Dept: CARDIAC REHAB | Age: 81
Discharge: HOME | End: 2021-06-14
Payer: MEDICARE

## 2021-06-14 VITALS — WEIGHT: 213 LBS | BODY MASS INDEX: 27.35 KG/M2

## 2021-06-14 PROCEDURE — 93798 PHYS/QHP OP CAR RHAB W/ECG: CPT

## 2021-06-16 ENCOUNTER — HOSPITAL ENCOUNTER (OUTPATIENT)
Dept: CARDIAC REHAB | Age: 81
Discharge: HOME | End: 2021-06-16
Payer: MEDICARE

## 2021-06-16 VITALS — WEIGHT: 212 LBS | BODY MASS INDEX: 27.22 KG/M2

## 2021-06-16 PROCEDURE — 93798 PHYS/QHP OP CAR RHAB W/ECG: CPT

## 2021-06-18 ENCOUNTER — HOSPITAL ENCOUNTER (OUTPATIENT)
Dept: CARDIAC REHAB | Age: 81
Discharge: HOME | End: 2021-06-18
Payer: MEDICARE

## 2021-06-18 VITALS — BODY MASS INDEX: 27.22 KG/M2 | WEIGHT: 212 LBS

## 2021-06-18 PROCEDURE — 93798 PHYS/QHP OP CAR RHAB W/ECG: CPT

## 2021-06-21 ENCOUNTER — HOSPITAL ENCOUNTER (OUTPATIENT)
Dept: CARDIAC REHAB | Age: 81
Discharge: HOME | End: 2021-06-21
Payer: MEDICARE

## 2021-06-21 VITALS — WEIGHT: 212 LBS | BODY MASS INDEX: 27.22 KG/M2

## 2021-06-21 PROCEDURE — 93798 PHYS/QHP OP CAR RHAB W/ECG: CPT

## 2021-06-23 ENCOUNTER — HOSPITAL ENCOUNTER (OUTPATIENT)
Dept: CARDIAC REHAB | Age: 81
Discharge: HOME | End: 2021-06-23
Payer: MEDICARE

## 2021-06-23 VITALS — WEIGHT: 211 LBS | BODY MASS INDEX: 27.09 KG/M2

## 2021-06-23 PROCEDURE — 93798 PHYS/QHP OP CAR RHAB W/ECG: CPT

## 2021-06-25 ENCOUNTER — HOSPITAL ENCOUNTER (OUTPATIENT)
Dept: CARDIAC REHAB | Age: 81
Discharge: HOME | End: 2021-06-25
Payer: MEDICARE

## 2021-06-25 VITALS — BODY MASS INDEX: 27.48 KG/M2 | WEIGHT: 214 LBS

## 2021-06-25 PROCEDURE — 93798 PHYS/QHP OP CAR RHAB W/ECG: CPT

## 2021-06-25 NOTE — PROGRESS NOTES
CARDIAC REHAB ITP REASSESSMENT FOR REVIEW AND SIGNATURE  Patient name: Theresa Paul : 1940      Visits from Start of Care: 27                                                 Reporting Period:  to      Subjective Reports: chronic knee and hip pain, otherwise progressing well           Goals Comments   1. Increase endurance     []? met                      []? not met  [x]? progressing Increase peak METS on the treadmill from 2.9 to 3.1 by next recert      Key functional changes: Has increased peak METS on the treadmill from 2.4 to 2.9 during this recert (Met recert goal)       Problems/ barriers to goal attainment: chronic knee and hip pain     Assessment / Recommendations:Continue w/ rehab     Qamar Velazquez RN 2021 4:13 PM     Cardiac ITP     [facility]   Treatment Diagnosis 1 PCI   PCI Date 21   Treatment Diagnosis 2 NSTEMI   NSTEMI Date 21   Referral Date 21   Significant Cardiovascular History Chronic atrial fibrillation, Pacemaker   ITP Visit Type Re-Assessment   1st Date of Exercise  21   ITP Next Review Date 21   Visit #/Total Visits    EF % 55 %   Risk Stratification Moderate   ITP Exercise, Psychosocial, Tobacco, Nutrition, Education   Stages of Change Action   Assisted Devices None   Mode Treadmill, Stepper, Bike, Ergometer   Frequency per week 2-3   Duration per session 35-55   Intensity  METS       1.5-3.0   RPE 11-13   Target Heart Rate 83-97   Resistance Training Yes   Resting /68   Peak /72   Is BP WDL?  Yes   Type Leg lifts   Frequency 3-5   Resistance Training No   Education Exercise safety, Signs/Symptoms to report, Equipment orientation, RPE scale   Target Goal(s) Aerobic activity 30 + minutes/day  5 days/week, BP < 140/90 or < 130/80, if DM or CKD, Individual exercise RX   Stages of Change Action   Interventions No intervention indicated   Currently Taking Psychotropic Meds No   Medication Changes No   Education Impact self care behaviors on health, Relaxation techniques, Stress management class, Coping techniques   Target Goal(s) Engages in self-care behaviors, Maximizes coping skills   Uses Stress Mgmt Techniques Yes   Stages of Change Action   Diabetes No   Date Lipids Drawn 01/22/21   Total 165   HDL 51   .4   Triglycerides 63   Lipid Med(s) Lipitor   Lipid Med Change(s) No   Weight  97.1 kg (214 lb)   Height  6' 2\" (1.88 m)   BMI 27.53   Waist Circumference  45\"   Alcohol None   Dietitian Consult No   Nurse/Patient Discussion Yes   Nutrition Class Yes   Diabetes Education Referral No   Lipid Clinic Referral No   Weight Management Referral No   Education Low fat & cholesterol diet, Carb-controlled diet, Low sodium diet, Healthy eating   Target Goals LDL-C less than 100, BMI less than 25, Waist size less than 40 inches males and less than 35 inches for females, Weight loss of 5-10%   Learning Barrier Ready to learn   Education Schedule Given Yes   Education CAD, Risk factors, Med Compliance, Cardiac A&P, Signs/Symptoms of Angina   Hypertension Yes   Hypertension Controlled Yes   Is BP WDL?  Yes   Med(s) Change No   BP Meds Metoprolol   Target Goals Medication compliance, Risk factors, Understand target guidelines for lipids, Understand target guidelines for B/P   Exercise     CR PHASE II from 6/25/2021 in [facility]   Any problems changes since your last visit Denies   Any symptoms while exercising denies   Psychosocial/Stress Level 0   Resting EKG rhythm SR/atrial pacing   Tobacco Use None   ITP Next Review Date 06/27/21   Visit Number/Total Visits 27/36   On Call Medical Director Immediately Available [facility]   Target Heart Rate(Range) 83-97   Resting HR 76   Resting /68   Recovery HR 87   Recovery /72   Weight 97.1 kg (214 lb)   Exercise EKG Rhythm SR/atrial pacing   Exercise Duration 45   Peak HR 99   Peak RPE 13   Peak Mets 3.6   Asymptomatic yes   Total Minutes 55

## 2021-06-28 ENCOUNTER — HOSPITAL ENCOUNTER (OUTPATIENT)
Dept: CARDIAC REHAB | Age: 81
Discharge: HOME | End: 2021-06-28
Payer: MEDICARE

## 2021-06-28 VITALS — WEIGHT: 211 LBS | BODY MASS INDEX: 27.09 KG/M2

## 2021-06-28 PROCEDURE — 93798 PHYS/QHP OP CAR RHAB W/ECG: CPT

## 2021-06-28 PROCEDURE — 93797 PHYS/QHP OP CAR RHAB WO ECG: CPT

## 2021-06-30 ENCOUNTER — OFFICE VISIT (OUTPATIENT)
Dept: CARDIOLOGY CLINIC | Age: 81
End: 2021-06-30
Payer: MEDICARE

## 2021-06-30 ENCOUNTER — HOSPITAL ENCOUNTER (OUTPATIENT)
Dept: CARDIAC REHAB | Age: 81
Discharge: HOME | End: 2021-06-30
Payer: MEDICARE

## 2021-06-30 VITALS — WEIGHT: 212 LBS | BODY MASS INDEX: 27.22 KG/M2

## 2021-06-30 DIAGNOSIS — I48.0 PAROXYSMAL ATRIAL FIBRILLATION (HCC): Primary | ICD-10-CM

## 2021-06-30 DIAGNOSIS — Z95.0 CARDIAC PACEMAKER IN SITU: ICD-10-CM

## 2021-06-30 DIAGNOSIS — I49.5 SICK SINUS SYNDROME (HCC): ICD-10-CM

## 2021-06-30 PROCEDURE — 93798 PHYS/QHP OP CAR RHAB W/ECG: CPT

## 2021-06-30 PROCEDURE — 93294 REM INTERROG EVL PM/LDLS PM: CPT | Performed by: INTERNAL MEDICINE

## 2021-07-01 NOTE — PROGRESS NOTES
Dual Chamber Pacemaker. 17 months left on battery. Lead impedance and threshold WNL. A paced 99 %, V sensed 99 %. 0 events.

## 2021-07-02 ENCOUNTER — HOSPITAL ENCOUNTER (OUTPATIENT)
Dept: CARDIAC REHAB | Age: 81
Discharge: HOME | End: 2021-07-02
Payer: MEDICARE

## 2021-07-02 VITALS — WEIGHT: 214 LBS | BODY MASS INDEX: 27.48 KG/M2

## 2021-07-02 PROCEDURE — 93798 PHYS/QHP OP CAR RHAB W/ECG: CPT

## 2021-07-05 ENCOUNTER — APPOINTMENT (OUTPATIENT)
Dept: CARDIAC REHAB | Age: 81
End: 2021-07-05
Payer: MEDICARE

## 2021-07-06 ENCOUNTER — HOSPITAL ENCOUNTER (OUTPATIENT)
Dept: CARDIAC REHAB | Age: 81
Discharge: HOME | End: 2021-07-06
Payer: MEDICARE

## 2021-07-06 VITALS — WEIGHT: 213 LBS | BODY MASS INDEX: 27.35 KG/M2

## 2021-07-06 PROCEDURE — 93798 PHYS/QHP OP CAR RHAB W/ECG: CPT

## 2021-07-07 ENCOUNTER — HOSPITAL ENCOUNTER (OUTPATIENT)
Dept: CARDIAC REHAB | Age: 81
Discharge: HOME | End: 2021-07-07
Payer: MEDICARE

## 2021-07-07 VITALS — WEIGHT: 212 LBS | BODY MASS INDEX: 27.22 KG/M2

## 2021-07-07 PROCEDURE — 93798 PHYS/QHP OP CAR RHAB W/ECG: CPT

## 2021-07-09 ENCOUNTER — HOSPITAL ENCOUNTER (OUTPATIENT)
Dept: CARDIAC REHAB | Age: 81
Discharge: HOME | End: 2021-07-09
Payer: MEDICARE

## 2021-07-09 VITALS — WEIGHT: 210 LBS | BODY MASS INDEX: 26.96 KG/M2

## 2021-07-09 PROCEDURE — 93798 PHYS/QHP OP CAR RHAB W/ECG: CPT

## 2021-07-12 ENCOUNTER — HOSPITAL ENCOUNTER (OUTPATIENT)
Dept: CARDIAC REHAB | Age: 81
Discharge: HOME | End: 2021-07-12
Payer: MEDICARE

## 2021-07-12 VITALS — BODY MASS INDEX: 27.35 KG/M2 | WEIGHT: 213 LBS

## 2021-07-12 PROCEDURE — 93798 PHYS/QHP OP CAR RHAB W/ECG: CPT

## 2021-07-14 ENCOUNTER — HOSPITAL ENCOUNTER (OUTPATIENT)
Dept: CARDIAC REHAB | Age: 81
Discharge: HOME | End: 2021-07-14
Payer: MEDICARE

## 2021-07-14 VITALS — BODY MASS INDEX: 27.22 KG/M2 | WEIGHT: 212 LBS

## 2021-07-14 PROCEDURE — 93798 PHYS/QHP OP CAR RHAB W/ECG: CPT

## 2021-07-14 NOTE — PROGRESS NOTES
COMPLETE CARDIAC REHAB DISCHARGE NOTE FOR REVIEW AND SIGNATURE    Jenni Anderson has completed phase II cardiac rehab and attended 36 sessions from 4/30-7/14. Jenni Anderson is interested in maintaining optimal health and will work with Claudio Middleton MD.  He has improved his endurance and stamina through regular exercise during the program. He lost 2 lbs and 1 inch from his waist. Blood pressure is 120/75 and is WNL. He has also improved his Rate Your Plate, [place], DASI, and PHQ-9 depression scores and these were reviewed with patient. His MET level increased on his six minute walk test.     Jenni Anderson has met his recert goals and plans to continue exercising (at home, gym, etc). He has set revised goals that include cardio equipment, light weights and walking 3 to 5 times a week. Key Osborn RN  7/14/2021    Cardiac ITP     CR PHASE II from 7/14/2021 in [facility] 163   Treatment Diagnosis 1 PCI   PCI Date 04/12/21   Treatment Diagnosis 2 NSTEMI   NSTEMI Date 04/12/21   Referral Date 04/21/21   Significant Cardiovascular History Chronic atrial fibrillation, Pacemaker   ITP Visit Type Re-Assessment   1st Date of Exercise  04/30/21   ITP Next Review Date --   Visit #/Total Visits 36/36   EF % 55 %   Risk Stratification Moderate   ITP Exercise, Psychosocial, Tobacco, Nutrition, Education   Stages of Change Maintenance   DASI Total Score 26.95   Assisted Devices None   Mode Treadmill, Stepper, Bike, Ergometer   Frequency per week 2-3   Duration per session 35-55   Intensity  METS       1.5-4.0   RPE 11-13   Target Heart Rate 83-97   Resistance Training Yes   Resting /73   Peak /71   Is BP WDL?  Yes   Type Leg lifts   Frequency 3-5   Resistance Training No   Education Exercise safety, Signs/Symptoms to report, Equipment orientation, RPE scale   Target Goal(s) Aerobic activity 30 + minutes/day  5 days/week, BP < 140/90 or < 130/80, if DM or CKD, Individual exercise RX   Stages of Change Maintenance   [facility] Total Score 22   PHQ 9 Score 4   Interventions No intervention indicated   Currently Taking Psychotropic Meds No   Medication Changes No   Education Impact self care behaviors on health, Relaxation techniques, Stress management class, Coping techniques   Target Goal(s) Engages in self-care behaviors, Maximizes coping skills   Uses Stress Mgmt Techniques Yes   Stages of Change Maintenance   Diabetes No   Date Lipids Drawn 01/22/21   Total 165   HDL 51   .4   Triglycerides 63   Lipid Med(s) Lipitor   Lipid Med Change(s) No   Weight  96.2 kg (212 lb)   Height  6' 2\" (1.88 m)   BMI 27.28   Waist Circumference  44\"   Alcohol None   Rate Your Plate Total Score 53   Dietitian Consult No   Nurse/Patient Discussion Yes   Nutrition Class Yes   Diabetes Education Referral No   Lipid Clinic Referral No   Weight Management Referral No   Education Low fat & cholesterol diet, Carb-controlled diet, Low sodium diet, Healthy eating   Target Goals LDL-C less than 100, BMI less than 25, Waist size less than 40 inches males and less than 35 inches for females, Weight loss of 5-10%   Learning Barrier Ready to learn   Education Schedule Given Yes   Education CAD, Risk factors, Med Compliance, Cardiac A&P, Signs/Symptoms of Angina   Hypertension Yes   Hypertension Controlled Yes   Is BP WDL?  Yes   Med(s) Change No   BP Meds Metoprolol   Target Goals Medication compliance, Risk factors, Understand target guidelines for lipids, Understand target guidelines for B/P   Exercise     CR PHASE II from 7/14/2021 in [facility] 0447   Any problems changes since your last visit Denies   Any symptoms while exercising denies   Psychosocial/Stress Level 0   Resting EKG rhythm SR/atrial pacing   Tobacco Use None   Visit Number/Total Visits 36/36   On Call Medical Director Immediately Available [facility]   Target Heart Rate(Range) 83-97   Resting HR 73   Resting /73   Recovery HR 85   Recovery /66   Weight 96.2 kg (212 lb)   Exercise EKG Rhythm SR w/atrial pacing   Exercise Duration 46   Peak HR 99   Peak RPE 13   Peak Mets 3.6   Asymptomatic yes   Total Minutes 56

## 2021-07-16 ENCOUNTER — APPOINTMENT (OUTPATIENT)
Dept: CARDIAC REHAB | Age: 81
End: 2021-07-16
Payer: MEDICARE

## 2021-07-19 ENCOUNTER — APPOINTMENT (OUTPATIENT)
Dept: CARDIAC REHAB | Age: 81
End: 2021-07-19
Payer: MEDICARE

## 2021-07-21 ENCOUNTER — APPOINTMENT (OUTPATIENT)
Dept: CARDIAC REHAB | Age: 81
End: 2021-07-21
Payer: MEDICARE

## 2021-07-21 ENCOUNTER — OFFICE VISIT (OUTPATIENT)
Dept: ORTHOPEDIC SURGERY | Age: 81
End: 2021-07-21
Payer: MEDICARE

## 2021-07-21 VITALS
OXYGEN SATURATION: 99 % | HEART RATE: 71 BPM | TEMPERATURE: 97.5 F | RESPIRATION RATE: 15 BRPM | WEIGHT: 211 LBS | HEIGHT: 75 IN | BODY MASS INDEX: 26.24 KG/M2

## 2021-07-21 DIAGNOSIS — M25.562 CHRONIC PAIN OF LEFT KNEE: ICD-10-CM

## 2021-07-21 DIAGNOSIS — G89.29 CHRONIC PAIN OF LEFT KNEE: ICD-10-CM

## 2021-07-21 DIAGNOSIS — M17.12 PRIMARY OSTEOARTHRITIS OF LEFT KNEE: Primary | ICD-10-CM

## 2021-07-21 PROCEDURE — G8536 NO DOC ELDER MAL SCRN: HCPCS | Performed by: SPECIALIST

## 2021-07-21 PROCEDURE — 99213 OFFICE O/P EST LOW 20 MIN: CPT | Performed by: SPECIALIST

## 2021-07-21 PROCEDURE — G8417 CALC BMI ABV UP PARAM F/U: HCPCS | Performed by: SPECIALIST

## 2021-07-21 PROCEDURE — G8432 DEP SCR NOT DOC, RNG: HCPCS | Performed by: SPECIALIST

## 2021-07-21 PROCEDURE — 1101F PT FALLS ASSESS-DOCD LE1/YR: CPT | Performed by: SPECIALIST

## 2021-07-21 PROCEDURE — G8427 DOCREV CUR MEDS BY ELIG CLIN: HCPCS | Performed by: SPECIALIST

## 2021-07-21 PROCEDURE — G8756 NO BP MEASURE DOC: HCPCS | Performed by: SPECIALIST

## 2021-07-21 RX ORDER — DICLOFENAC SODIUM 10 MG/G
4 GEL TOPICAL 4 TIMES DAILY
Qty: 5 EACH | Refills: 5 | Status: SHIPPED | OUTPATIENT
Start: 2021-07-21 | End: 2022-08-04

## 2021-07-21 NOTE — PROGRESS NOTES
Patient: Jhonny Pineda                MRN: [id number]       SSN: [id number]  YOB: 1940        AGE: 79 y.o. SEX: male    PCP: Yenny Stacy MD  07/21/21    Chief Complaint   Patient presents with    Knee Pain     left knee        HISTORY:  Jhonny Pineda is a 79 y.o. male who is seen for left knee pain. He has felt better recently. He reports pain only when walking, but he also feels that it's easier to walk when he's more active. He has been experiencing left knee pain off and on for the past several months. 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 He felt a snapping sensation when he supported the water pack on his left knee as he opened the screen door. His knee pain has worsened since that episode. He feels increased knee pain if he walks around his neighborhood for more than three days in a row. He feels pain with standing and stair climbing. He experiences startup pain after sitting. He has a hard time getting up. Cortisone injections have helped in the past. He denies a h/o of gout. He completed a Euflexxa series on 4/2/21. Pain Assessment  7/21/2021   Location of Pain Knee   Location Modifiers Left   Severity of Pain 2   Quality of Pain Aching;Dull   Quality of Pain Comment -   Duration of Pain -   Frequency of Pain Intermittent   Aggravating Factors -   Limiting Behavior -   Relieving Factors -   Result of Injury -     Occupation, etc:   [employer] is retired. He retired from [email or web address] after 25 years. He also worked as a  for the [employer]. He lives alone in [place]. His wife passed away a year ago. He has a son in [place] and a son in [facility]. Both of his sons are retired . He has 2 granddaughters and 1 grandson. He is active in his [employer] community and volunteers for friends and neighbors. He used to play [employer].   He recently started using the elliptical and walking on the treadmill at the gym. Mr. [employer] weighs 211 lbs and is 6'2\" tall. He is not diabetic.     No results found for: HBA1C, GXI5MJKB, XJG3OYUM, YXT6TPVG  Weight Metrics 2021   Weight 211 lb 212 lb 213 lb 210 lb 212 lb 213 lb 214 lb   BMI 26.73 kg/m2 27.22 kg/m2 27.35 kg/m2 26.96 kg/m2 27.22 kg/m2 27.35 kg/m2 27.48 kg/m2       Patient Active Problem List   Diagnosis Code    Atrial fibrillation (HCC) I48.91    Sick sinus syndrome (HCC) I49.5    Impotence of organic origin N52.9    Personal history of malignant neoplasm of prostate Z85.46    Elevated prostate specific antigen (PSA) R97.20    HTN (hypertension) I10    Dyslipidemia E78.5    Essential hypertension I10    ACP (advance care planning) Z71.89    Cardiac pacemaker in situ Z95.0    Non-STEMI (non-ST elevated myocardial infarction) ([facility] [address] I21.4    CAD (coronary artery disease) I25.10     REVIEW OF SYSTEMS:    Constitutional Symptoms: Negative   Eyes: Negative   Ears, Nose, Throat and Mouth: Negative   Cardiovascular: Negative   Respiratory: Negative   Genitourinary: Per HPI   Gastrointestinal: Per HPI   Integumentary (Skin and/or Breast): Negative   Musculoskeletal: Per HPI   Endocrine/Rheumatologic: Negative   Neurological: Per HPI   Hematology/Lymphatic: Negative    Allergic/Immunologic: Negative   Phychiatric: Negative    Social History     Socioeconomic History    Marital status:      Spouse name: Not on file    Number of children: Not on file    Years of education: Not on file    Highest education level: Not on file   Occupational History    Not on file   Tobacco Use    Smoking status: Former Smoker     Packs/day: 0.25     Years: 1.00     Pack years: 0.25     Quit date: 1981     Years since quittin.3    Smokeless tobacco: Never Used   Substance and Sexual Activity    Alcohol use: No    Drug use: No    Sexual activity: Not on file   Other Topics Concern    Not on file   Social History Narrative    Not on file     Social Determinants of Health     Financial Resource Strain:     Difficulty of Paying Living Expenses:    Food Insecurity:     Worried About Running Out of Food in the Last Year:     [address] in the Last Year:    Transportation Needs:     Lack of Transportation (Medical):  Lack of Transportation (Non-Medical):    Physical Activity:     Days of Exercise per Week:     Minutes of Exercise per Session:    Stress:     Feeling of Stress :    Social Connections:     Frequency of Communication with Friends and Family:     Frequency of Social Gatherings with Friends and Family:     Attends Christianity Services:     Active Member of Clubs or Organizations:     Attends Club or Organization Meetings:     Marital Status:    Intimate Partner Violence:     Fear of Current or Ex-Partner:     Emotionally Abused:     Physically Abused:     Sexually Abused: Allergies   Allergen Reactions    Shellfish Containing Products Swelling and Other (comments)      Current Outpatient Medications   Medication Sig    atorvastatin (LIPITOR) 20 mg tablet Take 1 Tab by mouth nightly.  metoprolol succinate (TOPROL-XL) 50 mg XL tablet Take 1 Tab by mouth daily.  ticagrelor (BRILINTA) 90 mg tablet Take 1 Tab by mouth two (2) times a day.  aspirin 81 mg tablet Take 1 Tab by mouth daily.  nitroglycerin (NITROLINGUAL) 400 mcg/spray spray 1 Spray by SubLINGual route every five (5) minutes as needed for Chest Pain (Up to 3 maximum doses).  multivitamin, tx-iron-ca-min (THERA-M w/ IRON) 9 mg iron-400 mcg tab tablet Take 1 Tab by mouth daily.  fish oil-omega-3 fatty acids 340-1,000 mg capsule Take 1,000 mg by mouth.  latanoprost (XALATAN) 0.005 % ophthalmic solution Administer 1 Drop to both eyes daily.  brimonidine (ALPHAGAN) 0.2 % ophthalmic solution Administer 1 Drop to both eyes two (2) times a day.     ZINC MTH/COPPER/SAW PALM/GNSG (PROSTATE HEALTH FORMULA PO) Take 1 Tab by mouth daily.  predniSONE (DELTASONE) 20 mg tablet TAKE 3 TABLETS AT BEDTIME THE NIGHT BEFORE PROCEDURE, THEN 3 TABLETS 2 HOURS BEFORE PROCEDURE. (Patient not taking: Reported on 7/21/2021)     No current facility-administered medications for this visit. PHYSICAL EXAMINATION:  Visit Vitals  Pulse 71   Temp 97.5 °F (36.4 °C) (Temporal)   Resp 15   Ht 6' 2.5\" (1.892 m)   Wt 211 lb (95.7 kg)   SpO2 99%   BMI 26.73 kg/m²      ORTHO EXAMINATION:  Examination Right knee Left knee   Skin Intact Intact   Range of motion 120-0 115-0   Effusion - ++   Medial joint line tenderness - +   Lateral joint line tenderness - -   Popliteal tenderness - -   Osteophytes palpable - +   Stephanies - -   Patella crepitus - +   Anterior drawer - -   Lateral laxity - -   Medial laxity - -   Varus deformity - -   Valgus deformity - -   Pretibial edema - -   Calf tenderness - -          RADIOGRAPHS:  XR LEFT KNEE 2/5/21 DEDRICK  IMPRESSION:  Three views with bilateral knees on AP view - No fractures, no effusion, moderate lateral joint space narrowing, no osteophytes present. Kellgren Dean grade 2    IMPRESSION:      ICD-10-CM ICD-9-CM    1. Primary osteoarthritis of left knee  M17.12 715.16 diclofenac (VOLTAREN) 1 % gel   2. Chronic pain of left knee  M25.562 719.46 diclofenac (VOLTAREN) 1 % gel    G89.29 338.29      PLAN:   Voltaren Gel Rx provided. Home exercise program outlined. There is no need for surgery or injection at this time. He will follow up as needed.       Scribed by Jermaine Rodgers ([address]) as dictated by Fred Anne MD

## 2021-07-22 ENCOUNTER — APPOINTMENT (OUTPATIENT)
Dept: INTERNAL MEDICINE CLINIC | Age: 81
End: 2021-07-22

## 2021-07-22 ENCOUNTER — HOSPITAL ENCOUNTER (OUTPATIENT)
Dept: LAB | Age: 81
Discharge: HOME | End: 2021-07-22
Payer: MEDICARE

## 2021-07-22 DIAGNOSIS — I10 ESSENTIAL HYPERTENSION: ICD-10-CM

## 2021-07-22 LAB
ALBUMIN SERPL-MCNC: 4.1 G/DL (ref 3.4–5)
ALBUMIN/GLOB SERPL: 1.3 {RATIO} (ref 0.8–1.7)
ALP SERPL-CCNC: 84 U/L (ref 45–117)
ALT SERPL-CCNC: 27 U/L (ref 16–61)
ANION GAP SERPL CALC-SCNC: 2 MMOL/L (ref 3–18)
AST SERPL-CCNC: 23 U/L (ref 10–38)
BILIRUB SERPL-MCNC: 1.1 MG/DL (ref 0.2–1)
BUN SERPL-MCNC: 14 MG/DL (ref 7–18)
BUN/CREAT SERPL: 12 (ref 12–20)
CALCIUM SERPL-MCNC: 9 MG/DL (ref 8.5–10.1)
CHLORIDE SERPL-SCNC: 109 MMOL/L (ref 100–111)
CHOLEST SERPL-MCNC: 104 MG/DL
CO2 SERPL-SCNC: 30 MMOL/L (ref 21–32)
CREAT SERPL-MCNC: 1.13 MG/DL (ref 0.6–1.3)
GLOBULIN SER CALC-MCNC: 3.1 G/DL (ref 2–4)
GLUCOSE SERPL-MCNC: 102 MG/DL (ref 74–99)
HDLC SERPL-MCNC: 48 MG/DL (ref 40–60)
HDLC SERPL: 2.2 {RATIO} (ref 0–5)
LDLC SERPL CALC-MCNC: 45.6 MG/DL (ref 0–100)
LIPID PROFILE,FLP: NORMAL
POTASSIUM SERPL-SCNC: 4.9 MMOL/L (ref 3.5–5.5)
PROT SERPL-MCNC: 7.2 G/DL (ref 6.4–8.2)
SODIUM SERPL-SCNC: 141 MMOL/L (ref 136–145)
TRIGL SERPL-MCNC: 52 MG/DL (ref ?–150)
VLDLC SERPL CALC-MCNC: 10.4 MG/DL

## 2021-07-22 PROCEDURE — 36415 COLL VENOUS BLD VENIPUNCTURE: CPT

## 2021-07-22 PROCEDURE — 80053 COMPREHEN METABOLIC PANEL: CPT

## 2021-07-22 PROCEDURE — 80061 LIPID PANEL: CPT

## 2021-07-29 ENCOUNTER — OFFICE VISIT (OUTPATIENT)
Dept: INTERNAL MEDICINE CLINIC | Age: 81
End: 2021-07-29
Payer: MEDICARE

## 2021-07-29 VITALS
DIASTOLIC BLOOD PRESSURE: 55 MMHG | TEMPERATURE: 97 F | SYSTOLIC BLOOD PRESSURE: 117 MMHG | BODY MASS INDEX: 26.98 KG/M2 | WEIGHT: 213 LBS | RESPIRATION RATE: 15 BRPM | HEART RATE: 72 BPM | OXYGEN SATURATION: 98 %

## 2021-07-29 DIAGNOSIS — I25.118 CORONARY ARTERY DISEASE OF NATIVE ARTERY OF NATIVE HEART WITH STABLE ANGINA PECTORIS (HCC): ICD-10-CM

## 2021-07-29 DIAGNOSIS — I10 ESSENTIAL HYPERTENSION: ICD-10-CM

## 2021-07-29 DIAGNOSIS — I48.0 PAROXYSMAL ATRIAL FIBRILLATION (HCC): ICD-10-CM

## 2021-07-29 DIAGNOSIS — Z00.00 MEDICARE ANNUAL WELLNESS VISIT, SUBSEQUENT: Primary | ICD-10-CM

## 2021-07-29 DIAGNOSIS — E78.5 DYSLIPIDEMIA: ICD-10-CM

## 2021-07-29 PROCEDURE — 99214 OFFICE O/P EST MOD 30 MIN: CPT | Performed by: INTERNAL MEDICINE

## 2021-07-29 PROCEDURE — G0463 HOSPITAL OUTPT CLINIC VISIT: HCPCS | Performed by: INTERNAL MEDICINE

## 2021-07-29 PROCEDURE — G8510 SCR DEP NEG, NO PLAN REQD: HCPCS | Performed by: INTERNAL MEDICINE

## 2021-07-29 PROCEDURE — G0439 PPPS, SUBSEQ VISIT: HCPCS | Performed by: INTERNAL MEDICINE

## 2021-07-29 PROCEDURE — G8536 NO DOC ELDER MAL SCRN: HCPCS | Performed by: INTERNAL MEDICINE

## 2021-07-29 PROCEDURE — G8752 SYS BP LESS 140: HCPCS | Performed by: INTERNAL MEDICINE

## 2021-07-29 PROCEDURE — G8754 DIAS BP LESS 90: HCPCS | Performed by: INTERNAL MEDICINE

## 2021-07-29 PROCEDURE — G8427 DOCREV CUR MEDS BY ELIG CLIN: HCPCS | Performed by: INTERNAL MEDICINE

## 2021-07-29 PROCEDURE — G8417 CALC BMI ABV UP PARAM F/U: HCPCS | Performed by: INTERNAL MEDICINE

## 2021-07-29 PROCEDURE — 1101F PT FALLS ASSESS-DOCD LE1/YR: CPT | Performed by: INTERNAL MEDICINE

## 2021-07-29 NOTE — PATIENT INSTRUCTIONS
Medicare Wellness Visit, Male    The best way to live healthy is to have a lifestyle where you eat a well-balanced diet, exercise regularly, limit alcohol use, and quit all forms of tobacco/nicotine, if applicable. Regular preventive services are another way to keep healthy. Preventive services (vaccines, screening tests, monitoring & exams) can help personalize your care plan, which helps you manage your own care. Screening tests can find health problems at the earliest stages, when they are easiest to treat. [facility] follows the current, evidence-based guidelines published by the [facility] Artemio Benson ([facility]) when recommending preventive services for our patients. Because we follow these guidelines, sometimes recommendations change over time as research supports it. (For example, a prostate screening blood test is no longer routinely recommended for men with no symptoms). Of course, you and your doctor may decide to screen more often for some diseases, based on your risk and co-morbidities (chronic disease you are already diagnosed with). Preventive services for you include:  - Medicare offers their members a free annual wellness visit, which is time for you and your primary care provider to discuss and plan for your preventive service needs. Take advantage of this benefit every year!  -All adults over age 72 should receive the recommended pneumonia vaccines. Current USPSTF guidelines recommend a series of two vaccines for the best pneumonia protection.   -All adults should have a flu vaccine yearly and tetanus vaccine every 10 years.  -All adults age 48 and older should receive the shingles vaccines (series of two vaccines).        -All adults age 38-68 who are overweight should have a diabetes screening test once every three years.   -Other screening tests & preventive services for persons with diabetes include: an eye exam to screen for diabetic retinopathy, a kidney function test, a foot exam, and stricter control over your cholesterol.   -Cardiovascular screening for adults with routine risk involves an electrocardiogram (ECG) at intervals determined by the provider.   -Colorectal cancer screening should be done for adults age 54-65 with no increased risk factors for colorectal cancer. There are a number of acceptable methods of screening for this type of cancer. Each test has its own benefits and drawbacks. Discuss with your provider what is most appropriate for you during your annual wellness visit. The different tests include: colonoscopy (considered the best screening method), a fecal occult blood test, a fecal DNA test, and sigmoidoscopy.  -All adults born between [facility] should be screened once for Hepatitis C.  -An Abdominal Aortic Aneurysm (AAA) Screening is recommended for men age 73-68 who has ever smoked in their lifetime.      Here is a list of your current Health Maintenance items (your personalized list of preventive services) with a due date:  Health Maintenance Due   Topic Date Due    Shingles Vaccine (1 of 2) Never done    COVID-19 Vaccine (2 - Pfizer 2-dose series) 02/23/2021

## 2021-07-29 NOTE — PROGRESS NOTES
Pt is here for   Chief Complaint   Patient presents with    Hypertension     follow up     1. Have you been to the ER, urgent care clinic or hospitalized since your last visit? YES [facility] ER on April 2021    2. Have you seen or consulted any other health care providers outside of the [address] [place] [address] since your last visit (Include any pap smears or colon screening)? NO      Do you have an Advanced Directive? YES    Would you like information on Advanced Directives?  NO

## 2021-07-29 NOTE — PROGRESS NOTES
This is the Subsequent Medicare Annual Wellness Exam, performed 12 months or more after the Initial AWV or the last Subsequent AWV    I have reviewed the patient's medical history in detail and updated the computerized patient record. Assessment/Plan   Education and counseling provided:  Are appropriate based on today's review and evaluation    1. Medicare annual wellness visit, subsequent  2. Essential hypertension  -     METABOLIC PANEL, COMPREHENSIVE; Future  3. Dyslipidemia  -     LIPID PANEL; Future  4. Coronary artery disease of native artery of native heart with stable angina pectoris (HCC)  5. Paroxysmal atrial fibrillation (HCC)       Depression Risk Factor Screening     3 most recent PHQ Screens 7/29/2021   Little interest or pleasure in doing things Not at all   Feeling down, depressed, irritable, or hopeless Not at all   Total Score PHQ 2 0   Trouble falling or staying asleep, or sleeping too much -   Feeling tired or having little energy -   Poor appetite, weight loss, or overeating -   Feeling bad about yourself - or that you are a failure or have let yourself or your family down -   Trouble concentrating on things such as school, work, reading, or watching TV -   Moving or speaking so slowly that other people could have noticed; or the opposite being so fidgety that others notice -   Thoughts of being better off dead, or hurting yourself in some way -   PHQ 9 Score -   How difficult have these problems made it for you to do your work, take care of your home and get along with others -       Alcohol Risk Screen    Do you average more than 1 drink per night or more than 7 drinks a week: No    In the past three months have you have had more than 4 drinks containing alcohol on one occasion: No        Functional Ability and Level of Safety    Hearing: Hearing is good. Activities of Daily Living:   The home contains: grab bars  Patient does total self care      Ambulation: with difficulty, uses nadeen cane     Fall Risk:  Fall Risk Assessment, last 12 mths 7/29/2021   Able to walk? Yes   Fall in past 12 months? 0   Do you feel unsteady? 0   Are you worried about falling 0   Is TUG test greater than 12 seconds? -   Is the gait abnormal? -   Number of falls in past 12 months -   Fall with injury? -      Abuse Screen:  Patient is not abused       Cognitive Screening    Has your family/caregiver stated any concerns about your memory: no     Cognitive Screening: Normal - .     Health Maintenance Due     Health Maintenance Due   Topic Date Due    Shingrix Vaccine Age 49> (1 of 2) Never done    COVID-19 Vaccine (2 - Pfizer 2-dose series) 02/23/2021       Patient Care Team   Patient Care Team:  Derrick Rios MD as PCP - Leyda Knowles MD as PCP - [facility]  Marichuy Cardenas MD (Cardiology)  Aye Mathur MD as Hospitalist (Radiation Oncology)  Carlos Enrique Marquez MD (Ophthalmology)  Gina Preciado MD (Urology)      Glaucoma Screening-  pt following for glaucoma   Pneumonia Vaccine-UTD  Shingles Vaccine-  Declines currently.    Tdap Vaccine-  10/2013    Colonoscopy-  10/2014 Dr vEans White normal f/u in 10 yrs  probably no further testing due to age  PSA- followed by Urology for prostate cancer status post external beam radiation  Advance Directive-  on file  History     Patient Active Problem List   Diagnosis Code    Atrial fibrillation [facility]) I48.91    Sick sinus syndrome ([facility] [address] I49.5    Impotence of organic origin N52.9    Personal history of malignant neoplasm of prostate Z85.46    Elevated prostate specific antigen (PSA) R97.20    HTN (hypertension) I10    Dyslipidemia E78.5    Essential hypertension I10    ACP (advance care planning) Z71.89    Cardiac pacemaker in situ Z95.0    Non-STEMI (non-ST elevated myocardial infarction) ([facility] [address] I21.4    CAD (coronary artery disease) I25.10     Past Medical History:   Diagnosis Date    [facility]     CHADS 0  (-CHF, -HTN, -AGE, -DM, -CVA)    Cardiac echocardiogram 06/29/2010    EF 70-75%. Mild conc LVh. Gr 1 DDfx. Mild DELMY.  Cardiac Holter monitor, normal 06/28/2010    Normal Holter study.  Cardiac nuclear imaging test 11/22/2010    Sm area of apical ischemia and inferolateral scar, both possibly apical thinning. No WMA. EF 60%.  Impotence of organic origin     Pacemaker     3/11  MEDTRONIC    Personal history of malignant neoplasm of prostate     T1a, [place] 6 (3+3)     Prostate cancer ([facility] [address]     PUD (peptic ulcer disease)     Sick sinus syndrome ([facility] [address]     status post implantation of Medtronic dual-chamber permanent pacemaker 3/22/11.  Unspecified essential hypertension     Venereal disease       Past Surgical History:   Procedure Laterality Date    HX CATARACT REMOVAL      HX MOHS PROCEDURES  1996    IN COLONOSCOPY FLX DX W/COLLJ SPEC WHEN PFRMD      IN CRYOSURG ABLATION OF PROSTATE  2/08    [facility], Dr. Sravani Silva     Current Outpatient Medications   Medication Sig Dispense Refill    diclofenac (VOLTAREN) 1 % gel Apply 4 g to affected area four (4) times daily. 5 Each 5    atorvastatin (LIPITOR) 20 mg tablet Take 1 Tab by mouth nightly. 90 Tab 3    metoprolol succinate (TOPROL-XL) 50 mg XL tablet Take 1 Tab by mouth daily. 90 Tab 3    ticagrelor (BRILINTA) 90 mg tablet Take 1 Tab by mouth two (2) times a day. 180 Tab 3    aspirin 81 mg tablet Take 1 Tab by mouth daily. 30 Tab 0    nitroglycerin (NITROLINGUAL) 400 mcg/spray spray 1 Spray by SubLINGual route every five (5) minutes as needed for Chest Pain (Up to 3 maximum doses). 1 Bottle 0    multivitamin, tx-iron-ca-min (THERA-M w/ IRON) 9 mg iron-400 mcg tab tablet Take 1 Tab by mouth daily.  fish oil-omega-3 fatty acids 340-1,000 mg capsule Take 1,000 mg by mouth.  latanoprost (XALATAN) 0.005 % ophthalmic solution Administer 1 Drop to both eyes daily.       brimonidine (ALPHAGAN) 0.2 % ophthalmic solution Administer 1 Drop to both eyes two (2) times a day.  ZINC MTH/COPPER/SAW PALM/GNSG (PROSTATE HEALTH FORMULA PO) Take 1 Tab by mouth daily.  predniSONE (DELTASONE) 20 mg tablet TAKE 3 TABLETS AT BEDTIME THE NIGHT BEFORE PROCEDURE, THEN 3 TABLETS 2 HOURS BEFORE PROCEDURE. (Patient not taking: Reported on 2021) 6 Tab 81     Allergies   Allergen Reactions    Shellfish Containing Products Swelling and Other (comments)       History reviewed. No pertinent family history. Social History     Tobacco Use    Smoking status: Former Smoker     Packs/day: 0.25     Years: 1.00     Pack years: 0.25     Quit date: 1981     Years since quittin.3    Smokeless tobacco: Never Used   Substance Use Topics    Alcohol use: No     A comprehensive 5 year plan for medical care and screening exams was reviewed with pt and they received a copy of it.         Temo Vyas MD

## 2021-08-03 NOTE — PROGRESS NOTES
Chilango Woods is a 79 y.o.  male and presents with Hypertension (follow up), Annual Wellness Visit, Constipation, Coronary Artery Disease, Irregular Heart Beat, and Cholesterol Problem      SUBJECTIVE:  Pt's HTN and Afib is well controlled on Cardizem. Patient had a recent non-ST elevation MI and underwent cardiac catheterization with placement of stent in April/May 2021. Patient has been placed on dual antiplatelet therapy and has been recommended he stay on this until April 2022. He is now on Lipitor 20 mg daily for dyslipidemia. His LDL is good at 45. Patient will follow up with cardiology for further management. Patient also status post pacemaker for sick sinus syndrome. Patient is followed by urology for history of prostate cancer. He had external beam radiation in the past.    Patient complaining of some occasional rectal bleeding x2. This could be triggered by his antiplatelet therapy associated with constipation. Patient uses rectal suppositories at times for constipation which may be a factor. Patient encouraged to increase water in his diet as well as fiber so that he does not need to use suppository laxatives. If he has any reoccurrence patient will need to be evaluated by GI and cardiology to see what are his options. Respiratory ROS: negative for - shortness of breath  Cardiovascular ROS: negative for - chest pain    Current Outpatient Medications   Medication Sig    diclofenac (VOLTAREN) 1 % gel Apply 4 g to affected area four (4) times daily.  atorvastatin (LIPITOR) 20 mg tablet Take 1 Tab by mouth nightly.  metoprolol succinate (TOPROL-XL) 50 mg XL tablet Take 1 Tab by mouth daily.  ticagrelor (BRILINTA) 90 mg tablet Take 1 Tab by mouth two (2) times a day.  aspirin 81 mg tablet Take 1 Tab by mouth daily.  nitroglycerin (NITROLINGUAL) 400 mcg/spray spray 1 Spray by SubLINGual route every five (5) minutes as needed for Chest Pain (Up to 3 maximum doses).     multivitamin, tx-iron-ca-min (THERA-M w/ IRON) 9 mg iron-400 mcg tab tablet Take 1 Tab by mouth daily.  fish oil-omega-3 fatty acids 340-1,000 mg capsule Take 1,000 mg by mouth.  latanoprost (XALATAN) 0.005 % ophthalmic solution Administer 1 Drop to both eyes daily.  brimonidine (ALPHAGAN) 0.2 % ophthalmic solution Administer 1 Drop to both eyes two (2) times a day.  ZINC MTH/COPPER/SAW PALM/GNSG (PROSTATE HEALTH FORMULA PO) Take 1 Tab by mouth daily.  predniSONE (DELTASONE) 20 mg tablet TAKE 3 TABLETS AT BEDTIME THE NIGHT BEFORE PROCEDURE, THEN 3 TABLETS 2 HOURS BEFORE PROCEDURE. (Patient not taking: Reported on 7/29/2021)     No current facility-administered medications for this visit.          OBJECTIVE:  alert, well appearing, and in no distress  Visit Vitals  BP (!) 117/55 (BP 1 Location: Right arm, BP Patient Position: Sitting, BP Cuff Size: Large adult)   Pulse 72   Temp 97 °F (36.1 °C) (Temporal)   Resp 15   Wt 213 lb (96.6 kg)   SpO2 98%   BMI 26.98 kg/m²      well developed and well nourished  Neck - supple, no significant adenopathy  Chest - clear to auscultation, no wheezes, rales or rhonchi, symmetric air entry  Heart - normal rate, regular rhythm, normal S1, S2, no murmurs, rubs, clicks or gallops  Extremities - peripheral pulses normal, no pedal edema, no clubbing or cyanosis  Skin - normal coloration and turgor, no rashes, no suspicious skin lesions noted      Labs:   Lab Results   Component Value Date/Time    Cholesterol, total 104 07/22/2021 09:13 AM    HDL Cholesterol 48 07/22/2021 09:13 AM    LDL, calculated 45.6 07/22/2021 09:13 AM    Triglyceride 52 07/22/2021 09:13 AM    CHOL/HDL Ratio 2.2 07/22/2021 09:13 AM          Labs:   Lab Results   Component Value Date/Time    Sodium 141 07/22/2021 09:13 AM    Potassium 4.9 07/22/2021 09:13 AM    Chloride 109 07/22/2021 09:13 AM    CO2 30 07/22/2021 09:13 AM    Anion gap 2 (L) 07/22/2021 09:13 AM    Glucose 102 (H) 07/22/2021 09:13 AM BUN 14 07/22/2021 09:13 AM    Creatinine 1.13 07/22/2021 09:13 AM    BUN/Creatinine ratio 12 07/22/2021 09:13 AM    GFR est AA >60 07/22/2021 09:13 AM    GFR est non-AA >60 07/22/2021 09:13 AM    Calcium 9.0 07/22/2021 09:13 AM    Bilirubin, total 1.1 (H) 07/22/2021 09:13 AM    ALT (SGPT) 27 07/22/2021 09:13 AM    Alk. phosphatase 84 07/22/2021 09:13 AM    Protein, total 7.2 07/22/2021 09:13 AM    Albumin 4.1 07/22/2021 09:13 AM    Globulin 3.1 07/22/2021 09:13 AM    A-G Ratio 1.3 07/22/2021 09:13 AM       Labs:   Lab Results   Component Value Date/Time    Prostate Specific Ag 0.14 04/05/2021 10:11 AM    Prostate Specific Ag 0.13 10/05/2020 10:04 AM    Prostate Specific Ag 0.11 03/05/2020 09:20 AM    Prostate Specific Ag 3.45 03/12/2014 04:01 PM    Prostate Specific Ag 2.760 01/11/2013 12:00 AM    Prostate Specific Ag 2.330 05/23/2012 09:37 AM         Assessment/Plan    ICD-10-CM ICD-9-CM    1. Medicare annual wellness visit, subsequent  Z00.00 V70.0    2. Essential hypertension  I10 401.9  well-controlled on Toprol-XL 50 mg daily. METABOLIC PANEL, COMPREHENSIVE   3. Dyslipidemia  E78.5 272.4  well-controlled Lipitor 20 mg daily LIPID PANEL   4. Coronary artery disease of native artery of native heart with stable angina pectoris ([facility] [address]  I25.118 414.01  stable on dual antiplatelet therapy. Patient continues to be followed by cardiology on a regular basis     413.9    5. Paroxysmal atrial fibrillation (HCC)  I48.0 427.31  patient continues on aspirin and Toprol and will follow up with cardiology. Patient status post pacemaker              Follow-up and Dispositions    · Return in about 6 months (around 1/29/2022) for labs 1 week before. Reviewed plan of care. Patient has provided input and agrees with goals.

## 2021-09-27 ENCOUNTER — OFFICE VISIT (OUTPATIENT)
Dept: CARDIOLOGY CLINIC | Age: 81
End: 2021-09-27
Payer: MEDICARE

## 2021-09-27 VITALS
OXYGEN SATURATION: 98 % | BODY MASS INDEX: 26.73 KG/M2 | WEIGHT: 215 LBS | HEIGHT: 75 IN | SYSTOLIC BLOOD PRESSURE: 132 MMHG | HEART RATE: 71 BPM | DIASTOLIC BLOOD PRESSURE: 80 MMHG

## 2021-09-27 DIAGNOSIS — I21.02 MYOCARDIAL INFARCTION INVOLVING LEFT ANTERIOR DESCENDING (LAD) CORONARY ARTERY, UNSPECIFIED MI TYPE (HCC): ICD-10-CM

## 2021-09-27 DIAGNOSIS — E78.5 DYSLIPIDEMIA: ICD-10-CM

## 2021-09-27 DIAGNOSIS — I25.10 CORONARY ARTERY DISEASE INVOLVING NATIVE CORONARY ARTERY OF NATIVE HEART WITHOUT ANGINA PECTORIS: Primary | ICD-10-CM

## 2021-09-27 DIAGNOSIS — I48.0 PAROXYSMAL ATRIAL FIBRILLATION (HCC): ICD-10-CM

## 2021-09-27 DIAGNOSIS — I49.5 SICK SINUS SYNDROME (HCC): ICD-10-CM

## 2021-09-27 DIAGNOSIS — I10 ESSENTIAL HYPERTENSION: ICD-10-CM

## 2021-09-27 PROCEDURE — G8427 DOCREV CUR MEDS BY ELIG CLIN: HCPCS | Performed by: INTERNAL MEDICINE

## 2021-09-27 PROCEDURE — G8536 NO DOC ELDER MAL SCRN: HCPCS | Performed by: INTERNAL MEDICINE

## 2021-09-27 PROCEDURE — 93280 PM DEVICE PROGR EVAL DUAL: CPT | Performed by: INTERNAL MEDICINE

## 2021-09-27 PROCEDURE — G8752 SYS BP LESS 140: HCPCS | Performed by: INTERNAL MEDICINE

## 2021-09-27 PROCEDURE — 99214 OFFICE O/P EST MOD 30 MIN: CPT | Performed by: INTERNAL MEDICINE

## 2021-09-27 PROCEDURE — 1101F PT FALLS ASSESS-DOCD LE1/YR: CPT | Performed by: INTERNAL MEDICINE

## 2021-09-27 PROCEDURE — G8417 CALC BMI ABV UP PARAM F/U: HCPCS | Performed by: INTERNAL MEDICINE

## 2021-09-27 PROCEDURE — G8754 DIAS BP LESS 90: HCPCS | Performed by: INTERNAL MEDICINE

## 2021-09-27 PROCEDURE — G8510 SCR DEP NEG, NO PLAN REQD: HCPCS | Performed by: INTERNAL MEDICINE

## 2021-09-27 NOTE — PROGRESS NOTES
HISTORY OF PRESENT ILLNESS  Jaquelin Wood is a 79 y.o. male. Follow-up  Pertinent negatives include no chest pain, no abdominal pain, no headaches and no shortness of breath. Patient presents for a follow-up office visit. He has a past medical history significant for paroxysmal atrial fibrillation, hypertension, and sick sinus syndrome, status post dual-chamber permanent pacemaker in March 2011. More recently, he was diagnosed with coronary disease in 2021. He underwent an exercise nuclear stress test in February 2019 which was a normal low risk study. He exercised 8 minutes on the treadmill using the Raghu protocol without any EKG changes or symptoms. No ischemia. EF 72%. The patient was hospitalized in April 2021 with a non-ST elevation myocardial infarction. He presented with acute onset chest pain was found to have an elevated troponin which peaked at 1.45. He subsequently underwent a cardiac catheterization which demonstrated severe two-vessel coronary artery disease. His culprit lesion was a 99% ostial LAD which underwent angioplasty and stenting with a single drug-eluting stent. He is also found to have 85% mid LAD stenosis which underwent stenting with a second drug-eluting stent. He had a residual distal left circumflex 70% lesion as well with plan for staged intervention in the future. An echocardiogram during his hospital stay demonstrated preserved LVEF of 55 to 60% with grade 1 diastolic dysfunction, no valvular heart disease and normal PA pressures. Patient subsequently underwent a staged PCI to his distal left circumflex with a another single drug-eluting stent in May 2021 using a Xience 3.0 x 12 mm stent. Since his PCI, he states his been feeling quite well. He has noticed some swelling specifically in his left foot and ankle which will improve overnight and worsens throughout the day.   He denies any recurrent chest pain, no shortness of breath, no heart palpitations, dizziness, nor syncope. No orthopnea, no PND. Past Medical History:   Diagnosis Date    Atrial fibrillation (HCC)     CHADS 0  (-CHF, -HTN, -AGE, -DM, -CVA)    Cardiac echocardiogram 06/29/2010    EF 70-75%. Mild conc LVh. Gr 1 DDfx. Mild DELMY.  Cardiac Holter monitor, normal 06/28/2010    Normal Holter study.  Cardiac nuclear imaging test 11/22/2010    Sm area of apical ischemia and inferolateral scar, both possibly apical thinning. No WMA. EF 60%.  Impotence of organic origin     Pacemaker     3/11  MEDTRONIC    Personal history of malignant neoplasm of prostate     T1a, [place] 6 (3+3)     Prostate [facility] ([facility]     PUD (peptic ulcer disease)     Sick sinus syndrome ([facility]     status post implantation of Medtronic dual-chamber permanent pacemaker 3/22/11.  Unspecified essential hypertension     Venereal disease       Current Outpatient Medications   Medication Sig Dispense Refill    diclofenac (VOLTAREN) 1 % gel Apply 4 g to affected area four (4) times daily. 5 Each 5    atorvastatin (LIPITOR) 20 mg tablet Take 1 Tab by mouth nightly. 90 Tab 3    metoprolol succinate (TOPROL-XL) 50 mg XL tablet Take 1 Tab by mouth daily. 90 Tab 3    ticagrelor (BRILINTA) 90 mg tablet Take 1 Tab by mouth two (2) times a day. 180 Tab 3    aspirin 81 mg tablet Take 1 Tab by mouth daily. 30 Tab 0    nitroglycerin (NITROLINGUAL) 400 mcg/spray spray 1 Spray by SubLINGual route every five (5) minutes as needed for Chest Pain (Up to 3 maximum doses). 1 Bottle 0    multivitamin, tx-iron-ca-min (THERA-M w/ IRON) 9 mg iron-400 mcg tab tablet Take 1 Tab by mouth daily.  fish oil-omega-3 fatty acids 340-1,000 mg capsule Take 1,000 mg by mouth.  latanoprost (XALATAN) 0.005 % ophthalmic solution Administer 1 Drop to both eyes daily.  brimonidine (ALPHAGAN) 0.2 % ophthalmic solution Administer 1 Drop to both eyes two (2) times a day.       ZINC MTH/COPPER/SAW PALM/GNSG (PROSTATE HEALTH FORMULA PO) Take 1 Tab by mouth daily. Allergies   Allergen Reactions    Shellfish Containing Products Swelling and Other (comments)      Social History     Tobacco Use    Smoking status: Former Smoker     Packs/day: 0.25     Years: 1.00     Pack years: 0.25     Quit date: 1981     Years since quittin.4    Smokeless tobacco: Never Used   Substance Use Topics    Alcohol use: No    Drug use: No         Review of Systems   Constitutional: Negative for chills, fever and weight loss. HENT: Negative for nosebleeds. Eyes: Negative for blurred vision and double vision. Respiratory: Negative for cough, shortness of breath and wheezing. Cardiovascular: Positive for leg swelling. Negative for chest pain, palpitations, orthopnea, claudication and PND. Gastrointestinal: Negative for abdominal pain, heartburn, nausea and vomiting. Genitourinary: Negative for dysuria and hematuria. Musculoskeletal: Negative for falls and myalgias. Skin: Negative for rash. Neurological: Negative for dizziness, focal weakness and headaches. Endo/Heme/Allergies: Does not bruise/bleed easily. Psychiatric/Behavioral: Negative for substance abuse. Visit Vitals  /80 (BP 1 Location: Left upper arm, BP Patient Position: Sitting, BP Cuff Size: Small adult)   Pulse 71   Ht 6' 2.5\" (1.892 m)   Wt 97.5 kg (215 lb)   SpO2 98%   BMI 27.24 kg/m²      Physical Exam  Constitutional:       Appearance: He is well-developed. HENT:      Head: Normocephalic and atraumatic. Eyes:      Conjunctiva/sclera: Conjunctivae normal.   Neck:      Vascular: No carotid bruit or JVD. Cardiovascular:      Rate and Rhythm: Normal rate and regular rhythm. Pulses: Normal pulses. Heart sounds: S1 normal and S2 normal. No murmur heard. No gallop. No S3 sounds. Pulmonary:      Breath sounds: Normal breath sounds. No wheezing or rales. Abdominal:      General: Bowel sounds are normal.      Palpations: Abdomen is soft. Tenderness: There is no abdominal tenderness. Musculoskeletal:         General: No tenderness. Cervical back: Neck supple. Left lower leg: Edema ( Trace) present. Skin:     General: Skin is warm and dry. Neurological:      General: No focal deficit present. Mental Status: He is alert and oriented to person, place, and time. Psychiatric:         Mood and Affect: Mood normal.         Behavior: Behavior normal.       EK% atrial paced rhythm, intrinsic QRS no ST-T wave changes concerning for ischemia. Compared to his previous EKG, no significant will change. Pacemaker interrogation. Battery life estimated at 16 months. No further atrial fibrillation since last device check. His last episode was at the beginning of 2021. Pacing in the atrium 100% in the ventricle less than 1%. Scanned document for details. ASSESSMENT and PLAN    Non-ST elevation myocardial infarction. This occurred in 2021. He was found to have a high-grade sequential lesions of his LAD with an ostial 99% lesion and a mid 85% lesion, both of which underwent angioplasty and stenting with a total of 2 drug-eluting stents. No regional wall motion abnormalities on his echocardiogram.  His troponin level went up to 1.45. He has been chest pain-free since his PCI. He will need to remain on dual antiplatelet therapy for a minimum of 12 months if not indefinitely. Coronary artery disease. Patient underwent PCI to his ostial mid LAD with a total of 2 drug-eluting stents in the setting of an acute non-ST elevation myocardial infarction in 2021. He then underwent a staged PCI to his 70% distal left circumflex stenosis with a single drug-eluting stent. I would continue dual antiplatelet therapy with aspirin and Brilinta until 2022. At that point I would likely switch him over to clopidogrel. Paroxysmal atrial fibrillation. 2 total episodes since the beginning of the year.   He had one episode in January and the second episode at the beginning of April. Each episode he was likely asymptomatic. These episodes lasted 9 to 10 hours in duration. If he has more frequent episodes on subsequent device checks, I would consider adding oral anticoagulation. Sick sinus syndrome. Status post dual-chamber permanent pacemaker. Normal device function on interrogation. The patient continues to pace the majority of the time in the atrium and 0% in the ventricle. Battery life estimated at 16 months. Essential hypertension. Patient blood pressure is now controlled on metoprolol as monotherapy. Dyslipidemia. Patient was started on atorvastatin 20 mg daily upon hospital discharge. His LDL should be less than 70 from a cardiac standpoint now that he has significant CAD. This has been followed by his PCP. [facility] device check in 3 months. Follow-up in 6 months, sooner if needed.

## 2021-09-27 NOTE — PROGRESS NOTES
Georgia Gagnon presents today for   Chief Complaint   Patient presents with    Follow-up     Follow Up After Cath    Pacemaker [address] [facility] preferred language for health care discussion is english/other. Is someone accompanying this pt? no    Is the patient using any DME equipment during [address]? no    Depression Screening:  3 most recent PHQ Screens 9/27/2021   Little interest or pleasure in doing things Not at all   Feeling down, depressed, irritable, or hopeless Not at all   Total Score PHQ 2 0   Trouble falling or staying asleep, or sleeping too much -   Feeling tired or having little energy -   Poor appetite, weight loss, or overeating -   Feeling bad about yourself - or that you are a failure or have let yourself or your family down -   Trouble concentrating on things such as school, work, reading, or watching TV -   Moving or speaking so slowly that other people could have noticed; or the opposite being so fidgety that others notice -   Thoughts of being better off dead, or hurting yourself in some way -   PHQ 9 Score -   How difficult have these problems made it for you to do your work, take care of your home and get along with others -       Learning Assessment:  Learning Assessment 9/27/2021   PRIMARY LEARNER Patient   HIGHEST LEVEL OF EDUCATION - PRIMARY LEARNER  -   BARRIERS PRIMARY LEARNER -   CO-LEARNER CAREGIVER -   PRIMARY LANGUAGE ENGLISH   LEARNER PREFERENCE PRIMARY DEMONSTRATION     -   ANSWERED BY patient   RELATIONSHIP SELF       Abuse Screening:  Abuse Screening Questionnaire 9/27/2021   Do you ever feel afraid of your partner? N   Are you in a relationship with someone who physically or mentally threatens you? N   Is it safe for you to go home? Y       Fall Risk  Fall Risk Assessment, last 12 mths 9/27/2021   Able to walk? Yes   Fall in past 12 months? 0   Do you feel unsteady? 0   Are you worried about falling 0   Is TUG test greater than 12 seconds?  -   Is the gait abnormal? -   Number of falls in past 12 months -   Fall with injury? -           Pt currently taking Anticoagulant therapy? no    Pt currently taking Antiplatelet therapy ? ASA 81 mg once a day and Brilinta 90 mg twice a day      Coordination of Care:  1. Have you been to the ER, urgent care clinic since your last visit? Hospitalized since your last visit? no    2. Have you seen or consulted any other health care providers outside of the [address] [place] [address] since your last visit? Include any pap smears or colon screening.  no

## 2021-11-30 ENCOUNTER — TELEPHONE (OUTPATIENT)
Dept: INTERNAL MEDICINE CLINIC | Age: 81
End: 2021-11-30

## 2021-12-29 ENCOUNTER — OFFICE VISIT (OUTPATIENT)
Dept: CARDIOLOGY CLINIC | Age: 81
End: 2021-12-29
Payer: MEDICARE

## 2021-12-29 DIAGNOSIS — I49.5 SICK SINUS SYNDROME (HCC): ICD-10-CM

## 2021-12-29 DIAGNOSIS — Z95.0 CARDIAC PACEMAKER IN SITU: ICD-10-CM

## 2021-12-29 DIAGNOSIS — I48.0 PAROXYSMAL ATRIAL FIBRILLATION (HCC): Primary | ICD-10-CM

## 2021-12-29 PROCEDURE — 93294 REM INTERROG EVL PM/LDLS PM: CPT | Performed by: INTERNAL MEDICINE

## 2022-01-19 ENCOUNTER — APPOINTMENT (OUTPATIENT)
Dept: INTERNAL MEDICINE CLINIC | Age: 82
End: 2022-01-19

## 2022-01-19 ENCOUNTER — HOSPITAL ENCOUNTER (OUTPATIENT)
Dept: LAB | Age: 82
Discharge: HOME | End: 2022-01-19
Payer: MEDICARE

## 2022-01-19 DIAGNOSIS — E78.5 DYSLIPIDEMIA: ICD-10-CM

## 2022-01-19 DIAGNOSIS — I10 ESSENTIAL HYPERTENSION: ICD-10-CM

## 2022-01-19 LAB
ALBUMIN SERPL-MCNC: 4.1 G/DL (ref 3.4–5)
ALBUMIN/GLOB SERPL: 1.2 {RATIO} (ref 0.8–1.7)
ALP SERPL-CCNC: 95 U/L (ref 45–117)
ALT SERPL-CCNC: 31 U/L (ref 16–61)
ANION GAP SERPL CALC-SCNC: 5 MMOL/L (ref 3–18)
AST SERPL-CCNC: 27 U/L (ref 10–38)
BILIRUB SERPL-MCNC: 1.1 MG/DL (ref 0.2–1)
BUN SERPL-MCNC: 14 MG/DL (ref 7–18)
BUN/CREAT SERPL: 12 (ref 12–20)
CALCIUM SERPL-MCNC: 9.6 MG/DL (ref 8.5–10.1)
CHLORIDE SERPL-SCNC: 106 MMOL/L (ref 100–111)
CHOLEST SERPL-MCNC: 108 MG/DL
CO2 SERPL-SCNC: 30 MMOL/L (ref 21–32)
CREAT SERPL-MCNC: 1.18 MG/DL (ref 0.6–1.3)
GLOBULIN SER CALC-MCNC: 3.3 G/DL (ref 2–4)
GLUCOSE SERPL-MCNC: 93 MG/DL (ref 74–99)
HDLC SERPL-MCNC: 48 MG/DL (ref 40–60)
HDLC SERPL: 2.3 {RATIO} (ref 0–5)
LDLC SERPL CALC-MCNC: 46 MG/DL (ref 0–100)
LIPID PROFILE,FLP: NORMAL
POTASSIUM SERPL-SCNC: 4.5 MMOL/L (ref 3.5–5.5)
PROT SERPL-MCNC: 7.4 G/DL (ref 6.4–8.2)
SODIUM SERPL-SCNC: 141 MMOL/L (ref 136–145)
TRIGL SERPL-MCNC: 70 MG/DL (ref ?–150)
VLDLC SERPL CALC-MCNC: 14 MG/DL

## 2022-01-19 PROCEDURE — 80061 LIPID PANEL: CPT

## 2022-01-19 PROCEDURE — 80053 COMPREHEN METABOLIC PANEL: CPT

## 2022-01-19 PROCEDURE — 36415 COLL VENOUS BLD VENIPUNCTURE: CPT

## 2022-01-24 NOTE — PROGRESS NOTES
Dual Pacemaker. 14 months left on battery. Lead impedance and threshold WNL. A paced 99 %, V sensed 99 %. 1 high atrial rate lasting 59 sec.

## 2022-01-27 ENCOUNTER — OFFICE VISIT (OUTPATIENT)
Dept: INTERNAL MEDICINE CLINIC | Age: 82
End: 2022-01-27
Payer: MEDICARE

## 2022-01-27 VITALS
DIASTOLIC BLOOD PRESSURE: 71 MMHG | HEART RATE: 65 BPM | WEIGHT: 215 LBS | SYSTOLIC BLOOD PRESSURE: 113 MMHG | TEMPERATURE: 97.4 F | BODY MASS INDEX: 26.73 KG/M2 | RESPIRATION RATE: 20 BRPM | HEIGHT: 75 IN | OXYGEN SATURATION: 100 %

## 2022-01-27 DIAGNOSIS — I10 ESSENTIAL HYPERTENSION: ICD-10-CM

## 2022-01-27 DIAGNOSIS — C61 PROSTATE CANCER (HCC): ICD-10-CM

## 2022-01-27 DIAGNOSIS — I25.118 CORONARY ARTERY DISEASE OF NATIVE ARTERY OF NATIVE HEART WITH STABLE ANGINA PECTORIS (HCC): ICD-10-CM

## 2022-01-27 DIAGNOSIS — I49.5 SICK SINUS SYNDROME (HCC): ICD-10-CM

## 2022-01-27 DIAGNOSIS — I48.0 PAROXYSMAL ATRIAL FIBRILLATION (HCC): ICD-10-CM

## 2022-01-27 DIAGNOSIS — E78.5 DYSLIPIDEMIA: ICD-10-CM

## 2022-01-27 DIAGNOSIS — E11.9 TYPE 2 DIABETES MELLITUS WITHOUT COMPLICATION, WITHOUT LONG-TERM CURRENT USE OF INSULIN (HCC): Primary | ICD-10-CM

## 2022-01-27 LAB — HBA1C MFR BLD HPLC: 6.1 %

## 2022-01-27 PROCEDURE — G8417 CALC BMI ABV UP PARAM F/U: HCPCS | Performed by: INTERNAL MEDICINE

## 2022-01-27 PROCEDURE — G8510 SCR DEP NEG, NO PLAN REQD: HCPCS | Performed by: INTERNAL MEDICINE

## 2022-01-27 PROCEDURE — 99214 OFFICE O/P EST MOD 30 MIN: CPT | Performed by: INTERNAL MEDICINE

## 2022-01-27 PROCEDURE — 1101F PT FALLS ASSESS-DOCD LE1/YR: CPT | Performed by: INTERNAL MEDICINE

## 2022-01-27 PROCEDURE — G8752 SYS BP LESS 140: HCPCS | Performed by: INTERNAL MEDICINE

## 2022-01-27 PROCEDURE — G8427 DOCREV CUR MEDS BY ELIG CLIN: HCPCS | Performed by: INTERNAL MEDICINE

## 2022-01-27 PROCEDURE — G8754 DIAS BP LESS 90: HCPCS | Performed by: INTERNAL MEDICINE

## 2022-01-27 PROCEDURE — G0463 HOSPITAL OUTPT CLINIC VISIT: HCPCS | Performed by: INTERNAL MEDICINE

## 2022-01-27 PROCEDURE — G8536 NO DOC ELDER MAL SCRN: HCPCS | Performed by: INTERNAL MEDICINE

## 2022-01-27 PROCEDURE — 83036 HEMOGLOBIN GLYCOSYLATED A1C: CPT | Performed by: INTERNAL MEDICINE

## 2022-01-27 NOTE — PROGRESS NOTES
Rene Mendosa is a 79 y.o.  male and presents with Hypertension, Cholesterol Problem (f/u), Diabetes, Insomnia (try melatonin ), Irregular Heart Beat, and Coronary Artery Disease      SUBJECTIVE:  Pt's HTN and Afib are well controlled on Toprol XL 50 mg. Patient had a recent non-ST elevation MI and underwent cardiac catheterization with placement of stent in April/May 2021. Patient has been placed on dual antiplatelet therapy and has been recommended he stay on this until April 2022. He is now on Lipitor 20 mg daily for dyslipidemia. His LDL is good at 46. Patient will follow up with cardiology for further management. Patient also status post pacemaker for sick sinus syndrome. Patient is followed by urology for history of prostate cancer. He had external beam radiation in the past.  Patient also was recently diagnosed with diabetes with an A1c of 6.5 at the [place]. He is trying to control this currently with diet by cutting back starches and sweets and his A1c is down to 6.1. Therefore no medication is needed at this time. Respiratory ROS: negative for - shortness of breath  Cardiovascular ROS: negative for - chest pain    Current Outpatient Medications   Medication Sig    diclofenac (VOLTAREN) 1 % gel Apply 4 g to affected area four (4) times daily.  atorvastatin (LIPITOR) 20 mg tablet Take 1 Tab by mouth nightly.  metoprolol succinate (TOPROL-XL) 50 mg XL tablet Take 1 Tab by mouth daily.  ticagrelor (BRILINTA) 90 mg tablet Take 1 Tab by mouth two (2) times a day.  aspirin 81 mg tablet Take 1 Tab by mouth daily.  nitroglycerin (NITROLINGUAL) 400 mcg/spray spray 1 Spray by SubLINGual route every five (5) minutes as needed for Chest Pain (Up to 3 maximum doses).  multivitamin, tx-iron-ca-min (THERA-M w/ IRON) 9 mg iron-400 mcg tab tablet Take 1 Tab by mouth daily.  fish oil-omega-3 fatty acids 340-1,000 mg capsule Take 1,000 mg by mouth.     latanoprost (XALATAN) 0.005 % ophthalmic solution Administer 1 Drop to both eyes daily.  brimonidine (ALPHAGAN) 0.2 % ophthalmic solution Administer 1 Drop to both eyes two (2) times a day.  ZINC MTH/COPPER/SAW PALM/GNSG (PROSTATE HEALTH FORMULA PO) Take 1 Tab by mouth daily. No current facility-administered medications for this visit. OBJECTIVE:  alert, well appearing, and in no distress  Visit Vitals  /71 (BP 1 Location: Left arm, BP Patient Position: Sitting, BP Cuff Size: Adult)   Pulse 65   Temp 97.4 °F (36.3 °C) (Temporal)   Resp 20   Ht 6' 2.5\" (1.892 m)   Wt 215 lb (97.5 kg)   SpO2 100%   BMI 27.24 kg/m²      well developed and well nourished  Neck - supple, no significant adenopathy  Chest - clear to auscultation, no wheezes, rales or rhonchi, symmetric air entry  Heart - normal rate, regular rhythm, normal S1, S2, no murmurs, rubs, clicks or gallops  Extremities - peripheral pulses normal, no pedal edema, no clubbing or cyanosis  Skin - normal coloration and turgor, no rashes, no suspicious skin lesions noted      Labs:   Lab Results   Component Value Date/Time    Cholesterol, total 108 01/19/2022 09:04 AM    HDL Cholesterol 48 01/19/2022 09:04 AM    LDL, calculated 46 01/19/2022 09:04 AM    Triglyceride 70 01/19/2022 09:04 AM    CHOL/HDL Ratio 2.3 01/19/2022 09:04 AM          Labs:   Lab Results   Component Value Date/Time    Sodium 141 01/19/2022 09:04 AM    Potassium 4.5 01/19/2022 09:04 AM    Chloride 106 01/19/2022 09:04 AM    CO2 30 01/19/2022 09:04 AM    Anion gap 5 01/19/2022 09:04 AM    Glucose 93 01/19/2022 09:04 AM    BUN 14 01/19/2022 09:04 AM    Creatinine 1.18 01/19/2022 09:04 AM    BUN/Creatinine ratio 12 01/19/2022 09:04 AM    GFR est AA >60 01/19/2022 09:04 AM    GFR est non-AA 59 (L) 01/19/2022 09:04 AM    Calcium 9.6 01/19/2022 09:04 AM    Bilirubin, total 1.1 (H) 01/19/2022 09:04 AM    ALT (SGPT) 31 01/19/2022 09:04 AM    Alk.  phosphatase 95 01/19/2022 09:04 AM    Protein, total 7.4 01/19/2022 09:04 AM    Albumin 4.1 01/19/2022 09:04 AM    Globulin 3.3 01/19/2022 09:04 AM    A-G Ratio 1.2 01/19/2022 09:04 AM       Labs:   Lab Results   Component Value Date/Time    Prostate Specific Ag 0.14 04/05/2021 10:11 AM    Prostate Specific Ag 0.13 10/05/2020 10:04 AM    Prostate Specific Ag 0.11 03/05/2020 09:20 AM    Prostate Specific Ag 3.45 03/12/2014 04:01 PM    Prostate Specific Ag 2.760 01/11/2013 12:00 AM    Prostate Specific Ag 2.330 05/23/2012 09:37 AM         Assessment/Plan      ICD-10-CM ICD-9-CM    1. Type 2 diabetes mellitus without complication, without long-term current use of insulin (HCC)  E11.9 250.00  control currently with diet AMB POC HEMOGLOBIN A1C      HEMOGLOBIN A1C W/O EAG   2. Essential hypertension  I10 401.9  control on Toprol-XL 50 mg daily METABOLIC PANEL, COMPREHENSIVE   3. Dyslipidemia  E78.5 272.4  controlled Lipitor 20 mg daily LIPID PANEL   4. Coronary artery disease of native artery of native heart with stable angina pectoris ([facility]  I25.118 414.01  stable on statin and dual antiplatelet therapy. Patient followed closely by cardiology     413.9    5. Sick sinus syndrome ([facility]  I49.5 427.81  patient is status post pacemaker and monitor closely by cardiology   6. Paroxysmal atrial fibrillation (HCC)  I48.0 427.31  patient stable on Toprol XL will follow closely by cardiology   7. Prostate cancer ([facility]  C61 185  patient's PSA remains around 0 and he continues to be followed by urology              Follow-up and Dispositions    · Return in about 6 months (around 7/27/2022) for OV, and Medicare Wellness Visit, labs 1 week before. Reviewed plan of care. Patient has provided input and agrees with goals.

## 2022-01-27 NOTE — PATIENT INSTRUCTIONS
High Blood Pressure: Care Instructions  Overview     It's normal for blood pressure to go up and down throughout the day. But if it stays up, you have high blood pressure. Another name for high blood pressure is hypertension. Despite what a lot of people think, high blood pressure usually doesn't cause headaches or make you feel dizzy or lightheaded. It usually has no symptoms. But it does increase your risk of stroke, heart attack, and other problems. You and your doctor will talk about your risks of these problems based on your blood pressure. Your doctor will give you a goal for your blood pressure. Your goal will be based on your health and your age. Lifestyle changes, such as eating healthy and being active, are always important to help lower blood pressure. You might also take medicine to reach your blood pressure goal.  Follow-up care is a key part of your treatment and safety. Be sure to make and go to all appointments, and call your doctor if you are having problems. It's also a good idea to know your test results and keep a list of the medicines you take. How can you care for yourself at home? Medical treatment  · If you stop taking your medicine, your blood pressure will go back up. You may take one or more types of medicine to lower your blood pressure. Be safe with medicines. Take your medicine exactly as prescribed. Call your doctor if you think you are having a problem with your medicine. · Talk to your doctor before you start taking aspirin every day. Aspirin can help certain people lower their risk of a heart attack or stroke. But taking aspirin isn't right for everyone, because it can cause serious bleeding. · See your doctor regularly. You may need to see the doctor more often at first or until your blood pressure comes down. · If you are taking blood pressure medicine, talk to your doctor before you take decongestants or anti-inflammatory medicine, such as ibuprofen.  Some of these medicines can raise blood pressure. · Learn how to check your blood pressure at home. Lifestyle changes  · Stay at a healthy weight. This is especially important if you put on weight around the waist. Losing even 10 pounds can help you lower your blood pressure. · If your doctor recommends it, get more exercise. Walking is a good choice. Bit by bit, increase the amount you walk every day. Try for at least 30 minutes on most days of the week. You also may want to swim, bike, or do other activities. · Avoid or limit alcohol. Talk to your doctor about whether you can drink any alcohol. · Try to limit how much sodium you eat to less than 2,300 milligrams (mg) a day. Your doctor may ask you to try to eat less than 1,500 mg a day. · Eat plenty of fruits (such as bananas and oranges), vegetables, legumes, whole grains, and low-fat dairy products. · Lower the amount of saturated fat in your diet. Saturated fat is found in animal products such as milk, cheese, and meat. Limiting these foods may help you lose weight and also lower your risk for heart disease. · Do not smoke. Smoking increases your risk for heart attack and stroke. If you need help quitting, talk to your doctor about stop-smoking programs and medicines. These can increase your chances of quitting for good. When should you call for help? Call 911  anytime you think you may need emergency care. This may mean having symptoms that suggest that your blood pressure is causing a serious heart or blood vessel problem. Your blood pressure may be over 180/120. For example, call 911 if:    · You have symptoms of a heart attack. These may include:  ? Chest pain or pressure, or a strange feeling in the chest.  ? Sweating. ? Shortness of breath. ? Nausea or vomiting. ? Pain, pressure, or a strange feeling in the back, neck, jaw, or upper belly or in one or both shoulders or arms. ? Lightheadedness or sudden weakness.   ? A fast or irregular heartbeat.     · You have symptoms of a stroke. These may include:  ? Sudden numbness, tingling, weakness, or loss of movement in your face, arm, or leg, especially on only one side of your body. ? Sudden vision changes. ? Sudden trouble speaking. ? Sudden confusion or trouble understanding simple statements. ? Sudden problems with walking or balance. ? A sudden, severe headache that is different from past headaches.     · You have severe back or belly pain. Do not wait until your blood pressure comes down on its own. Get help right away. Call your doctor now or seek immediate care if:    · Your blood pressure is much higher than normal (such as 180/120 or higher), but you don't have symptoms.     · You think high blood pressure is causing symptoms, such as:  ? Severe headache.  ? Blurry vision. Watch closely for changes in your health, and be sure to contact your doctor if:    · Your blood pressure measures higher than your doctor recommends at least 2 times. That means the top number is higher or the bottom number is higher, or both.     · You think you may be having side effects from your blood pressure medicine. Where can you learn more? Go to http://www.gray.com/  Enter [id number] in the search box to learn more about \"High Blood Pressure: Care Instructions. \"  Current as of: April 29, 2021               Content Version: 13.0  © 2006-2021 [employer]. Care instructions adapted under license by [employer] (which disclaims liability or warranty for this information). If you have questions about a medical condition or this instruction, always ask your healthcare professional. [place] [address] any warranty or liability for your use of this information.

## 2022-01-27 NOTE — PROGRESS NOTES
Patient is in the office today for a 6 month follow up. Do you have an Advance Directive no  Do you want more information : information given     1. \"Have you been to the ER, urgent care clinic since your last visit? Hospitalized since your last visit? \" No    2. \"Have you seen or consulted any other health care providers outside of the [address] [place] [address] since your last visit? \" yes, [place] providers. 3. For patients aged 39-70: Has the patient had a colonoscopy / FIT/ Cologuard? Yes - no Care Gap present      If the patient is female:    4. For patients aged 41-77: Has the patient had a mammogram within the past 2 years? NA - based on age or sex  See top three    5. For patients aged 21-65: Has the patient had a pap smear?  NA - based on age or sex

## 2022-03-18 PROBLEM — I21.4 NON-STEMI (NON-ST ELEVATED MYOCARDIAL INFARCTION) (HCC): Status: ACTIVE | Noted: 2021-04-12

## 2022-03-18 PROBLEM — I25.10 CAD (CORONARY ARTERY DISEASE): Status: ACTIVE | Noted: 2021-04-12

## 2022-03-28 ENCOUNTER — OFFICE VISIT (OUTPATIENT)
Dept: CARDIOLOGY CLINIC | Age: 82
End: 2022-03-28
Payer: MEDICARE

## 2022-03-28 VITALS
HEART RATE: 63 BPM | OXYGEN SATURATION: 96 % | HEIGHT: 75 IN | BODY MASS INDEX: 26.61 KG/M2 | SYSTOLIC BLOOD PRESSURE: 134 MMHG | DIASTOLIC BLOOD PRESSURE: 80 MMHG | WEIGHT: 214 LBS

## 2022-03-28 DIAGNOSIS — I25.10 CORONARY ARTERY DISEASE INVOLVING NATIVE CORONARY ARTERY OF NATIVE HEART WITHOUT ANGINA PECTORIS: Primary | ICD-10-CM

## 2022-03-28 DIAGNOSIS — I48.0 PAROXYSMAL ATRIAL FIBRILLATION (HCC): ICD-10-CM

## 2022-03-28 DIAGNOSIS — E78.5 DYSLIPIDEMIA: ICD-10-CM

## 2022-03-28 DIAGNOSIS — I10 ESSENTIAL HYPERTENSION: ICD-10-CM

## 2022-03-28 DIAGNOSIS — I21.02 MYOCARDIAL INFARCTION INVOLVING LEFT ANTERIOR DESCENDING (LAD) CORONARY ARTERY, UNSPECIFIED MI TYPE (HCC): ICD-10-CM

## 2022-03-28 DIAGNOSIS — Z95.0 CARDIAC PACEMAKER IN SITU: ICD-10-CM

## 2022-03-28 DIAGNOSIS — I49.5 SICK SINUS SYNDROME (HCC): ICD-10-CM

## 2022-03-28 PROCEDURE — G8752 SYS BP LESS 140: HCPCS | Performed by: INTERNAL MEDICINE

## 2022-03-28 PROCEDURE — 99214 OFFICE O/P EST MOD 30 MIN: CPT | Performed by: INTERNAL MEDICINE

## 2022-03-28 PROCEDURE — 1101F PT FALLS ASSESS-DOCD LE1/YR: CPT | Performed by: INTERNAL MEDICINE

## 2022-03-28 PROCEDURE — G8417 CALC BMI ABV UP PARAM F/U: HCPCS | Performed by: INTERNAL MEDICINE

## 2022-03-28 PROCEDURE — G8536 NO DOC ELDER MAL SCRN: HCPCS | Performed by: INTERNAL MEDICINE

## 2022-03-28 PROCEDURE — G8754 DIAS BP LESS 90: HCPCS | Performed by: INTERNAL MEDICINE

## 2022-03-28 PROCEDURE — G8427 DOCREV CUR MEDS BY ELIG CLIN: HCPCS | Performed by: INTERNAL MEDICINE

## 2022-03-28 PROCEDURE — 93000 ELECTROCARDIOGRAM COMPLETE: CPT | Performed by: INTERNAL MEDICINE

## 2022-03-28 PROCEDURE — 93280 PM DEVICE PROGR EVAL DUAL: CPT | Performed by: INTERNAL MEDICINE

## 2022-03-28 PROCEDURE — G8510 SCR DEP NEG, NO PLAN REQD: HCPCS | Performed by: INTERNAL MEDICINE

## 2022-03-28 NOTE — PROGRESS NOTES
Shane Vasquez presents today for   Chief Complaint   Patient presents with    Follow-up     6 month       Shane Vasquez preferred language for health care discussion is english/other. Is someone accompanying this pt? no    Is the patient using any DME equipment during [address]? no    Depression Screening:  3 most recent PHQ Screens 3/28/2022   Little interest or pleasure in doing things Not at all   Feeling down, depressed, irritable, or hopeless Not at all   Total Score PHQ 2 0   Trouble falling or staying asleep, or sleeping too much -   Feeling tired or having little energy -   Poor appetite, weight loss, or overeating -   Feeling bad about yourself - or that you are a failure or have let yourself or your family down -   Trouble concentrating on things such as school, work, reading, or watching TV -   Moving or speaking so slowly that other people could have noticed; or the opposite being so fidgety that others notice -   Thoughts of being better off dead, or hurting yourself in some way -   PHQ 9 Score -   How difficult have these problems made it for you to do your work, take care of your home and get along with others -       Learning Assessment:  Learning Assessment 3/28/2022   PRIMARY LEARNER Patient   HIGHEST LEVEL OF EDUCATION - PRIMARY LEARNER  -   BARRIERS PRIMARY LEARNER -   CO-LEARNER CAREGIVER -   PRIMARY LANGUAGE ENGLISH   LEARNER PREFERENCE PRIMARY DEMONSTRATION     -   ANSWERED BY patient   RELATIONSHIP SELF       Abuse Screening:  Abuse Screening Questionnaire 3/28/2022   Do you ever feel afraid of your partner? N   Are you in a relationship with someone who physically or mentally threatens you? N   Is it safe for you to go home? Y       Fall Risk  Fall Risk Assessment, last 12 mths 3/28/2022   Able to walk? Yes   Fall in past 12 months? 0   Do you feel unsteady? 0   Are you worried about falling 0   Is TUG test greater than 12 seconds?  -   Is the gait abnormal? -   Number of falls in past 12 months -   Fall with injury? -           Pt currently taking Anticoagulant therapy? brillinta 90 mg BID    Pt currently taking Antiplatelet therapy ? Aspirin 81 mg daily      Coordination of Care:  1. Have you been to the ER, urgent care clinic since your last visit? Hospitalized since your last visit? no    2. Have you seen or consulted any other health care providers outside of the [address] [place] [address] since your last visit? Include any pap smears or colon screening.  no

## 2022-03-28 NOTE — PROGRESS NOTES
HISTORY OF PRESENT ILLNESS  Vance Gatica is a 79 y.o. male. Follow-up  Pertinent negatives include no chest pain, no abdominal pain, no headaches and no shortness of breath. Patient presents for a follow-up office visit. He has a past medical history significant for paroxysmal atrial fibrillation, hypertension, and sick sinus syndrome, status post dual-chamber permanent pacemaker in March 2011. More recently, he was diagnosed with coronary disease in 2021. He underwent an exercise nuclear stress test in February 2019 which was a normal low risk study. He exercised 8 minutes on the treadmill using the Raghu protocol without any EKG changes or symptoms. No ischemia. EF 72%. The patient was hospitalized in April 2021 with a non-ST elevation myocardial infarction. He presented with acute onset chest pain was found to have an elevated troponin which peaked at 1.45. He subsequently underwent a cardiac catheterization which demonstrated severe two-vessel coronary artery disease. His culprit lesion was a 99% ostial LAD which underwent angioplasty and stenting with a single drug-eluting stent. He is also found to have 85% mid LAD stenosis which underwent stenting with a second drug-eluting stent. He had a residual distal left circumflex 70% lesion as well with plan for staged intervention in the future. An echocardiogram during his hospital stay demonstrated preserved LVEF of 55 to 60% with grade 1 diastolic dysfunction, no valvular heart disease and normal PA pressures. Patient subsequently underwent a staged PCI to his distal left circumflex with a another single drug-eluting stent in May 2021 using a Xience 3.0 x 12 mm stent. He was last seen in our office 6 months ago. Since last visit he has been feeling well. He describes occasional dizzy spells which only last for a few seconds at most in duration. No associated syncope or near syncope.   He is not experience any chest pain, shortness of breath, leg swelling or claudication. No major change in his activity tolerance. Past Medical History:   Diagnosis Date    Atrial fibrillation (HCC)     CHADS 0  (-CHF, -HTN, -AGE, -DM, -CVA)    Cardiac echocardiogram 06/29/2010    EF 70-75%. Mild conc LVh. Gr 1 DDfx. Mild DELMY.  Cardiac Holter monitor, normal 06/28/2010    Normal Holter study.  Cardiac nuclear imaging test 11/22/2010    Sm area of apical ischemia and inferolateral scar, both possibly apical thinning. No WMA. EF 60%.  Impotence of organic origin     Pacemaker     3/11  MEDTRONIC    Personal history of malignant neoplasm of prostate     T1a, [place] 6 (3+3)     Prostate [facility] [address]     PUD (peptic ulcer disease)     Sick sinus syndrome ([facility] [address]     status post implantation of Medtronic dual-chamber permanent pacemaker 3/22/11.  Unspecified essential hypertension     Venereal disease       Current Outpatient Medications   Medication Sig Dispense Refill    diclofenac (VOLTAREN) 1 % gel Apply 4 g to affected area four (4) times daily. 5 Each 5    atorvastatin (LIPITOR) 20 mg tablet Take 1 Tab by mouth nightly. 90 Tab 3    metoprolol succinate (TOPROL-XL) 50 mg XL tablet Take 1 Tab by mouth daily. 90 Tab 3    ticagrelor (BRILINTA) 90 mg tablet Take 1 Tab by mouth two (2) times a day. 180 Tab 3    aspirin 81 mg tablet Take 1 Tab by mouth daily. 30 Tab 0    nitroglycerin (NITROLINGUAL) 400 mcg/spray spray 1 Spray by SubLINGual route every five (5) minutes as needed for Chest Pain (Up to 3 maximum doses). 1 Bottle 0    multivitamin, tx-iron-ca-min (THERA-M w/ IRON) 9 mg iron-400 mcg tab tablet Take 1 Tab by mouth daily.  fish oil-omega-3 fatty acids 340-1,000 mg capsule Take 1,000 mg by mouth.  latanoprost (XALATAN) 0.005 % ophthalmic solution Administer 1 Drop to both eyes daily.  brimonidine (ALPHAGAN) 0.2 % ophthalmic solution Administer 1 Drop to both eyes two (2) times a day.       ZINC MTH/COPPER/SAW PALM/GNSG (PROSTATE HEALTH FORMULA PO) Take 1 Tab by mouth daily. Allergies   Allergen Reactions    Shellfish Containing Products Swelling and Other (comments)    Iodine Swelling      Social History     Tobacco Use    Smoking status: Former Smoker     Packs/day: 0.25     Years: 1.00     Pack years: 0.25     Quit date: 1981     Years since quittin.9    Smokeless tobacco: Never Used   Vaping Use    Vaping Use: Never used   Substance Use Topics    Alcohol use: No    Drug use: No       History reviewed. No pertinent family history. Review of Systems   Constitutional: Negative for chills, fever and weight loss. HENT: Negative for nosebleeds. Eyes: Negative for blurred vision and double vision. Respiratory: Negative for cough, shortness of breath and wheezing. Cardiovascular: Negative for chest pain, palpitations, orthopnea, claudication, leg swelling and PND. Gastrointestinal: Negative for abdominal pain, heartburn, nausea and vomiting. Genitourinary: Negative for dysuria and hematuria. Musculoskeletal: Negative for falls and myalgias. Skin: Negative for rash. Neurological: Negative for dizziness, focal weakness and headaches. Endo/Heme/Allergies: Does not bruise/bleed easily. Psychiatric/Behavioral: Negative for substance abuse. Visit Vitals  /80 (BP 1 Location: Left upper arm, BP Patient Position: Sitting, BP Cuff Size: Large adult)   Pulse 63   Ht 6' 2.5\" (1.892 m)   Wt 97.1 kg (214 lb)   SpO2 96%   BMI 27.11 kg/m²      Physical Exam  Constitutional:       Appearance: He is well-developed. HENT:      Head: Normocephalic and atraumatic. Eyes:      Conjunctiva/sclera: Conjunctivae normal.   Neck:      Vascular: No carotid bruit or JVD. Cardiovascular:      Rate and Rhythm: Normal rate and regular rhythm. Pulses: Normal pulses. Heart sounds: S1 normal and S2 normal. No murmur heard. No gallop. No S3 sounds.     Pulmonary: Breath sounds: Normal breath sounds. No wheezing or rales. Abdominal:      General: Bowel sounds are normal.      Palpations: Abdomen is soft. Tenderness: There is no abdominal tenderness. Musculoskeletal:         General: No tenderness. Cervical back: Neck supple. Left lower leg: Edema ( Trace) present. Skin:     General: Skin is warm and dry. Neurological:      General: No focal deficit present. Mental Status: He is alert and oriented to person, place, and time. Psychiatric:         Mood and Affect: Mood normal.         Behavior: Behavior normal.       EK% atrial paced rhythm, intrinsic QRS no ST-T wave changes concerning for ischemia. Compared to his previous EKG, no significant will change. Pacemaker interrogation. Battery life estimated at 12 months. Single episode of atrial fibrillation in 2022 lasting for 15 hours. His last episode prior to that was 2021. Pacing in the atrium 99 % in the ventricle less than 1%. Scanned document for details. ASSESSMENT and PLAN    History of Non-ST elevation myocardial infarction. This occurred in 2021. He was found to have a high-grade sequential lesions of his LAD with an ostial 99% lesion and a mid 85% lesion, both of which underwent angioplasty and stenting with a total of 2 drug-eluting stents. No regional wall motion abnormalities on his echocardiogram.  His troponin level went up to 1.45. He has been chest pain-free since his PCI. I have recommended stopping his Brilinta at the end of 2022    Coronary artery disease. Patient underwent PCI to his ostial mid LAD with a total of 2 drug-eluting stents in the setting of an acute non-ST elevation myocardial infarction in 2021. He then underwent a staged PCI to his 70% distal left circumflex stenosis with a single drug-eluting stent. I would continue dual antiplatelet therapy with aspirin and Brilinta until 2022.       Paroxysmal atrial fibrillation. Patient with a single episode over the past 6 months. This occurred in January 2022 lasting approximately 15 hours. No high rate episodes. His previous episode was in April 2021. If his episodes become more frequent, I would recommend starting oral anticoagulation especially since he will be stopping his Brilinta at the end of next month. Sick sinus syndrome. Status post dual-chamber permanent pacemaker. Normal device function on interrogation. The patient continues to pace the majority of the time in the atrium and 0% in the ventricle. Battery life estimated at 12 months. Essential hypertension. Patient blood pressure is now controlled on metoprolol as monotherapy. Dyslipidemia. Patient was started on atorvastatin 20 mg daily upon hospital discharge. His LDL should be less than 70 from a cardiac standpoint now that he has significant CAD. This has been followed by his PCP. [facility] device check in 3 months. Follow-up in 6 months, sooner if needed.

## 2022-03-28 NOTE — PATIENT INSTRUCTIONS
Follow up with Dr Aleixs Carver with EKG in 6 months with device check  3 month [facility]link  Pasquale Cespedes the end of April 2022

## 2022-04-11 RX ORDER — METOPROLOL SUCCINATE 50 MG/1
TABLET, EXTENDED RELEASE ORAL
Qty: 90 TABLET | Refills: 3 | Status: SHIPPED | OUTPATIENT
Start: 2022-04-11

## 2022-05-23 RX ORDER — ATORVASTATIN CALCIUM 20 MG/1
TABLET, FILM COATED ORAL
Qty: 90 TABLET | Refills: 3 | Status: SHIPPED | OUTPATIENT
Start: 2022-05-23

## 2022-06-29 ENCOUNTER — OFFICE VISIT (OUTPATIENT)
Dept: CARDIOLOGY CLINIC | Age: 82
End: 2022-06-29
Payer: MEDICARE

## 2022-06-29 DIAGNOSIS — I48.0 PAROXYSMAL ATRIAL FIBRILLATION (HCC): Primary | ICD-10-CM

## 2022-06-29 DIAGNOSIS — I49.5 SICK SINUS SYNDROME (HCC): ICD-10-CM

## 2022-06-29 PROCEDURE — 93296 REM INTERROG EVL PM/IDS: CPT | Performed by: INTERNAL MEDICINE

## 2022-06-29 PROCEDURE — 93294 REM INTERROG EVL PM/LDLS PM: CPT | Performed by: INTERNAL MEDICINE

## 2022-07-18 ENCOUNTER — TELEPHONE (OUTPATIENT)
Dept: INTERNAL MEDICINE CLINIC | Age: 82
End: 2022-07-18

## 2022-07-18 NOTE — TELEPHONE ENCOUNTER
Pt calling was DX covid positive (home test) Saturday. He also tested at [facility] pharmacy Saturday and got positive results today. Said sx of cough and runny nose, congestion (now yellow phlegm) started Thur, cough worsened Friday. Stated has cough and congestion but does not feel bad. No fever, no loss of taste or smell, no fatique. He has been using OTC Tussin DM Max for cough and congestion. It has helped.

## 2022-07-26 ENCOUNTER — HOSPITAL ENCOUNTER (OUTPATIENT)
Dept: LAB | Age: 82
Discharge: HOME | End: 2022-07-26
Payer: MEDICARE

## 2022-07-26 DIAGNOSIS — E11.9 TYPE 2 DIABETES MELLITUS WITHOUT COMPLICATION, WITHOUT LONG-TERM CURRENT USE OF INSULIN (HCC): ICD-10-CM

## 2022-07-26 DIAGNOSIS — I10 ESSENTIAL HYPERTENSION: ICD-10-CM

## 2022-07-26 DIAGNOSIS — E78.5 DYSLIPIDEMIA: ICD-10-CM

## 2022-07-26 LAB
ALBUMIN SERPL-MCNC: 3.6 G/DL (ref 3.4–5)
ALBUMIN/GLOB SERPL: 1.1 {RATIO} (ref 0.8–1.7)
ALP SERPL-CCNC: 84 U/L (ref 45–117)
ALT SERPL-CCNC: 24 U/L (ref 16–61)
ANION GAP SERPL CALC-SCNC: 4 MMOL/L (ref 3–18)
AST SERPL-CCNC: 22 U/L (ref 10–38)
BILIRUB SERPL-MCNC: 0.8 MG/DL (ref 0.2–1)
BUN SERPL-MCNC: 18 MG/DL (ref 7–18)
BUN/CREAT SERPL: 16 (ref 12–20)
CALCIUM SERPL-MCNC: 8.6 MG/DL (ref 8.5–10.1)
CHLORIDE SERPL-SCNC: 110 MMOL/L (ref 100–111)
CHOLEST SERPL-MCNC: 98 MG/DL
CO2 SERPL-SCNC: 28 MMOL/L (ref 21–32)
CREAT SERPL-MCNC: 1.13 MG/DL (ref 0.6–1.3)
GLOBULIN SER CALC-MCNC: 3.3 G/DL (ref 2–4)
GLUCOSE SERPL-MCNC: 101 MG/DL (ref 74–99)
HBA1C MFR BLD: 6.5 % (ref 4.2–5.6)
HDLC SERPL-MCNC: 37 MG/DL (ref 40–60)
HDLC SERPL: 2.6 {RATIO} (ref 0–5)
LDLC SERPL CALC-MCNC: 51 MG/DL (ref 0–100)
LIPID PROFILE,FLP: ABNORMAL
POTASSIUM SERPL-SCNC: 4.6 MMOL/L (ref 3.5–5.5)
PROT SERPL-MCNC: 6.9 G/DL (ref 6.4–8.2)
SODIUM SERPL-SCNC: 142 MMOL/L (ref 136–145)
TRIGL SERPL-MCNC: 50 MG/DL (ref ?–150)
VLDLC SERPL CALC-MCNC: 10 MG/DL

## 2022-07-26 PROCEDURE — 80061 LIPID PANEL: CPT

## 2022-07-26 PROCEDURE — 80053 COMPREHEN METABOLIC PANEL: CPT

## 2022-07-26 PROCEDURE — 83036 HEMOGLOBIN GLYCOSYLATED A1C: CPT

## 2022-07-26 PROCEDURE — 36415 COLL VENOUS BLD VENIPUNCTURE: CPT

## 2022-07-29 ENCOUNTER — OFFICE VISIT (OUTPATIENT)
Dept: INTERNAL MEDICINE CLINIC | Age: 82
End: 2022-07-29
Payer: MEDICARE

## 2022-07-29 VITALS
HEIGHT: 75 IN | SYSTOLIC BLOOD PRESSURE: 118 MMHG | HEART RATE: 65 BPM | RESPIRATION RATE: 16 BRPM | DIASTOLIC BLOOD PRESSURE: 65 MMHG | WEIGHT: 212.6 LBS | OXYGEN SATURATION: 99 % | BODY MASS INDEX: 26.43 KG/M2 | TEMPERATURE: 97.3 F

## 2022-07-29 DIAGNOSIS — I25.118 CORONARY ARTERY DISEASE OF NATIVE ARTERY OF NATIVE HEART WITH STABLE ANGINA PECTORIS (HCC): ICD-10-CM

## 2022-07-29 DIAGNOSIS — E78.5 DYSLIPIDEMIA: ICD-10-CM

## 2022-07-29 DIAGNOSIS — Z00.00 MEDICARE ANNUAL WELLNESS VISIT, SUBSEQUENT: Primary | ICD-10-CM

## 2022-07-29 DIAGNOSIS — I10 ESSENTIAL HYPERTENSION: ICD-10-CM

## 2022-07-29 DIAGNOSIS — E11.9 TYPE 2 DIABETES MELLITUS WITHOUT COMPLICATION, WITHOUT LONG-TERM CURRENT USE OF INSULIN (HCC): ICD-10-CM

## 2022-07-29 PROCEDURE — G8510 SCR DEP NEG, NO PLAN REQD: HCPCS | Performed by: INTERNAL MEDICINE

## 2022-07-29 PROCEDURE — G8754 DIAS BP LESS 90: HCPCS | Performed by: INTERNAL MEDICINE

## 2022-07-29 PROCEDURE — 1101F PT FALLS ASSESS-DOCD LE1/YR: CPT | Performed by: INTERNAL MEDICINE

## 2022-07-29 PROCEDURE — G8536 NO DOC ELDER MAL SCRN: HCPCS | Performed by: INTERNAL MEDICINE

## 2022-07-29 PROCEDURE — G8417 CALC BMI ABV UP PARAM F/U: HCPCS | Performed by: INTERNAL MEDICINE

## 2022-07-29 PROCEDURE — G0439 PPPS, SUBSEQ VISIT: HCPCS | Performed by: INTERNAL MEDICINE

## 2022-07-29 PROCEDURE — G8752 SYS BP LESS 140: HCPCS | Performed by: INTERNAL MEDICINE

## 2022-07-29 PROCEDURE — G8427 DOCREV CUR MEDS BY ELIG CLIN: HCPCS | Performed by: INTERNAL MEDICINE

## 2022-07-29 PROCEDURE — 1123F ACP DISCUSS/DSCN MKR DOCD: CPT | Performed by: INTERNAL MEDICINE

## 2022-07-29 PROCEDURE — 3044F HG A1C LEVEL LT 7.0%: CPT | Performed by: INTERNAL MEDICINE

## 2022-07-29 NOTE — PROGRESS NOTES
Joan Richardson presents today for   Chief Complaint   Patient presents with    Hypertension    Cholesterol Problem    Diabetes    Insomnia    Cough     Pt still having cough post covid       Is someone accompanying this pt? No    Is the patient using any DME equipment during OV? No    Depression Screening:  3 most recent PHQ Screens 7/29/2022   Little interest or pleasure in doing things Not at all   Feeling down, depressed, irritable, or hopeless Not at all   Total Score PHQ 2 0   Trouble falling or staying asleep, or sleeping too much -   Feeling tired or having little energy -   Poor appetite, weight loss, or overeating -   Feeling bad about yourself - or that you are a failure or have let yourself or your family down -   Trouble concentrating on things such as school, work, reading, or watching TV -   Moving or speaking so slowly that other people could have noticed; or the opposite being so fidgety that others notice -   Thoughts of being better off dead, or hurting yourself in some way -   PHQ 9 Score -   How difficult have these problems made it for you to do your work, take care of your home and get along with others -       Learning Assessment:  Learning Assessment 3/28/2022   PRIMARY LEARNER Patient   HIGHEST LEVEL OF EDUCATION - PRIMARY LEARNER  -   BARRIERS PRIMARY LEARNER -   CO-LEARNER CAREGIVER -   PRIMARY LANGUAGE ENGLISH   LEARNER PREFERENCE PRIMARY DEMONSTRATION     -   ANSWERED BY patient   RELATIONSHIP SELF       Abuse Screening:  Abuse Screening Questionnaire 3/28/2022   Do you ever feel afraid of your partner? N   Are you in a relationship with someone who physically or mentally threatens you? N   Is it safe for you to go home? Y       Fall Screening  Fall Risk Assessment, last 12 mths 3/28/2022   Able to walk? Yes   Fall in past 12 months? 0   Do you feel unsteady? 0   Are you worried about falling 0   Is TUG test greater than 12 seconds?  -   Is the gait abnormal? -   Number of falls in past 12 months -   Fall with injury? -       Generalized Anxiety  No flowsheet data found. Health Maintenance Due   Topic Date Due    Foot Exam Q1  Never done    MICROALBUMIN Q1  Never done    COVID-19 Vaccine (4 - Booster for Pfizer series) 02/04/2022    Medicare Yearly Exam  07/30/2022   . Health Maintenance reviewed and discussed and ordered per Provider. Vaccines Due   Screenings Due       Brian Henderson is updated on all HM    1. \"Have you been to the ER, urgent care clinic since your last visit? Hospitalized since your last visit? \" No    2. \"Have you seen or consulted any other health care providers outside of the [address] [place] [address] since your last visit? \" No     3. For patients aged 39-70: Has the patient had a colonoscopy / FIT/ Cologuard?  NA - based on age

## 2022-07-29 NOTE — PROGRESS NOTES
This is the Subsequent Medicare Annual Wellness Exam, performed 12 months or more after the Initial AWV or the last Subsequent AWV    I have reviewed the patient's medical history in detail and updated the computerized patient record. Pt's HTN and Afib are well controlled on Toprol XL 50 mg. Patient had a recent non-ST elevation MI and underwent cardiac catheterization with placement of stent in April/May 2021. Patient has been placed on dual antiplatelet therapy and has been recommended he stay on this until April 2022. He is now on Lipitor 20 mg daily for dyslipidemia. His LDL is good at 46. Patient will follow up with cardiology for further management. Patient also status post pacemaker for sick sinus syndrome. Patient is followed by urology for history of prostate cancer. He had external beam radiation in the past.  Patient also was recently diagnosed with diabetes with an A1c of 6.5 at the [facility]. He is trying to control this currently with diet by cutting back starches and sweets. Therefore no medication is needed at this time. Assessment/Plan   Education and counseling provided:  Are appropriate based on today's review and evaluation    1. Medicare annual wellness visit, subsequent  2. Type 2 diabetes mellitus without complication, without long-term current use of insulin ([facility])  -     HEMOGLOBIN A1C W/O EAG; Future  3. Essential hypertension  -     METABOLIC PANEL, COMPREHENSIVE; Future  4. Dyslipidemia  -     LIPID PANEL; Future  5.  Coronary artery disease of native artery of native heart with stable angina pectoris ([facility] [address]     Depression Risk Factor Screening     3 most recent PHQ Screens 7/29/2022   Little interest or pleasure in doing things Not at all   Feeling down, depressed, irritable, or hopeless Not at all   Total Score PHQ 2 0   Trouble falling or staying asleep, or sleeping too much -   Feeling tired or having little energy -   Poor appetite, weight loss, or overeating -   Feeling bad about yourself - or that you are a failure or have let yourself or your family down -   Trouble concentrating on things such as school, work, reading, or watching TV -   Moving or speaking so slowly that other people could have noticed; or the opposite being so fidgety that others notice -   Thoughts of being better off dead, or hurting yourself in some way -   PHQ 9 Score -   How difficult have these problems made it for you to do your work, take care of your home and get along with others -       Alcohol & Drug Abuse Risk Screen    Do you average more than 1 drink per night or more than 7 drinks a week: No    In the past three months have you have had more than 4 drinks containing alcohol on one occasion: No          Functional Ability and Level of Safety    Hearing: Hearing is good. Activities of Daily Living: The home contains: no safety equipment. Patient does total self care      Ambulation: with no difficulty     Fall Risk:  Fall Risk Assessment, last 12 mths 3/28/2022   Able to walk? Yes   Fall in past 12 months? 0   Do you feel unsteady? 0   Are you worried about falling 0   Is TUG test greater than 12 seconds? -   Is the gait abnormal? -   Number of falls in past 12 months -   Fall with injury? -      Abuse Screen:  Patient is not abused       Cognitive Screening    Has your family/caregiver stated any concerns about your memory: no     Cognitive Screening: Normal - . Health Maintenance Due     Health Maintenance Due   Topic Date Due    Foot Exam Q1  Never done    MICROALBUMIN Q1  Never done    COVID-19 Vaccine (4 - Booster for Pfizer series) 02/04/2022     Glaucoma Screening-  pt following for glaucoma   Pneumonia Vaccine-UTD  Shingles Vaccine-  Declines currently.     Tdap Vaccine-  10/2013    Colonoscopy-  10/2014 Dr Juliana [place] normal f/u in 10 yrs    PSA- followed by Urology for prostate cancer status post external beam radiation  Advance Directive-  on file    Patient Care Team   Patient Care Team:  Meka Olivares MD as PCP - Mckayla Palma MD as PCP - [facility] Provider  Sammy Callahan MD (Cardiovascular Disease Physician)  Wilfredo Mcbride MD as Hospitalist (Radiation Oncology)  Doretha Marion MD (Ophthalmology)  Laith Hilton MD (Urology)    History     Patient Active Problem List   Diagnosis Code    Atrial fibrillation [facility]) I48.91    Sick sinus syndrome ([facility] [address] I49.5    Impotence of organic origin N52.9    Personal history of malignant neoplasm of prostate Z85.46    Elevated prostate specific antigen (PSA) R97.20    Dyslipidemia E78.5    Essential hypertension I10    ACP (advance care planning) Z71.89    Cardiac pacemaker in situ Z95.0    Non-STEMI (non-ST elevated myocardial infarction) ([facility] [address] I21.4    CAD (coronary artery disease) I25.10     Past Medical History:   Diagnosis Date    Atrial fibrillation ([facility] [address]     CHADS 0  (-CHF, -HTN, -AGE, -DM, -CVA)    Cardiac echocardiogram 06/29/2010    EF 70-75%. Mild conc LVh. Gr 1 DDfx. Mild DELMY. Cardiac Holter monitor, normal 06/28/2010    Normal Holter study. Cardiac nuclear imaging test 11/22/2010    Sm area of apical ischemia and inferolateral scar, both possibly apical thinning. No WMA. EF 60%. Impotence of organic origin     Pacemaker     3/11  MEDTRONIC    Personal history of malignant neoplasm of prostate     T1a, [place] 6 (3+3)     Prostate cancer (HCC)     PUD (peptic ulcer disease)     Sick sinus syndrome ([facility] [address]     status post implantation of Medtronic dual-chamber permanent pacemaker 3/22/11.     Unspecified essential hypertension     Venereal disease       Past Surgical History:   Procedure Laterality Date    HX CATARACT REMOVAL      HX MOHS PROCEDURES  1996    MD COLONOSCOPY FLX DX W/COLLJ SPEC WHEN PFRMD      MD CRYOSURG ABLATION OF PROSTATE  2/08    [facility], Dr. Reji Gaytan     Current Outpatient Medications   Medication Sig Dispense Refill    bromfenac (BROMDAY) 0.09 % ophthalmic solution atorvastatin (LIPITOR) 20 mg tablet TAKE 1 TABLET NIGHTLY 90 Tablet 3    metoprolol succinate (TOPROL-XL) 50 mg XL tablet TAKE 1 TABLET DAILY 90 Tablet 3    ticagrelor (BRILINTA) 90 mg tablet Take 1 Tab by mouth two (2) times a day. 180 Tab 3    aspirin 81 mg tablet Take 1 Tab by mouth daily. 30 Tab 0    nitroglycerin (NITROLINGUAL) 400 mcg/spray spray 1 Spray by SubLINGual route every five (5) minutes as needed for Chest Pain (Up to 3 maximum doses). 1 Bottle 0    multivitamin, tx-iron-ca-min (THERA-M w/ IRON) 9 mg iron-400 mcg tab tablet Take 1 Tab by mouth daily. fish oil-omega-3 fatty acids 340-1,000 mg capsule Take 1,000 mg by mouth.      latanoprost (XALATAN) 0.005 % ophthalmic solution Administer 1 Drop to both eyes daily. brimonidine (ALPHAGAN) 0.2 % ophthalmic solution Administer 1 Drop to both eyes two (2) times a day. ZINC MTH/COPPER/SAW PALM/GNSG (PROSTATE HEALTH FORMULA PO) Take 1 Tab by mouth daily. Allergies   Allergen Reactions    Shellfish Containing Products Swelling and Other (comments)    Iodine Swelling       History reviewed. No pertinent family history.   Social History     Tobacco Use    Smoking status: Former     Packs/day: 0.25     Years: 1.00     Pack years: 0.25     Types: Cigarettes     Quit date: 1981     Years since quittin.3    Smokeless tobacco: Never   Substance Use Topics    Alcohol use: No     Results for orders placed or performed during the hospital encounter of 22   HEMOGLOBIN A1C W/O EAG   Result Value Ref Range    Hemoglobin A1c 6.5 (H) 4.2 - 5.6 %   METABOLIC PANEL, COMPREHENSIVE   Result Value Ref Range    Sodium 142 136 - 145 mmol/L    Potassium 4.6 3.5 - 5.5 mmol/L    Chloride 110 100 - 111 mmol/L    CO2 28 21 - 32 mmol/L    Anion gap 4 3.0 - 18 mmol/L    Glucose 101 (H) 74 - 99 mg/dL    BUN 18 7.0 - 18 MG/DL    Creatinine 1.13 0.6 - 1.3 MG/DL    BUN/Creatinine ratio 16 12 - 20      GFR est AA >60 >60 ml/min/1.73m2    GFR est non-AA >60 >60 ml/min/1.73m2    Calcium 8.6 8.5 - 10.1 MG/DL    Bilirubin, total 0.8 0.2 - 1.0 MG/DL    ALT (SGPT) 24 16 - 61 U/L    AST (SGOT) 22 10 - 38 U/L    Alk. phosphatase 84 45 - 117 U/L    Protein, total 6.9 6.4 - 8.2 g/dL    Albumin 3.6 3.4 - 5.0 g/dL    Globulin 3.3 2.0 - 4.0 g/dL    A-G Ratio 1.1 0.8 - 1.7     LIPID PANEL   Result Value Ref Range    LIPID PROFILE          Cholesterol, total 98 <200 MG/DL    Triglyceride 50 <150 MG/DL    HDL Cholesterol 37 (L) 40 - 60 MG/DL    LDL, calculated 51 0 - 100 MG/DL    VLDL, calculated 10 MG/DL    CHOL/HDL Ratio 2.6 0 - 5.0       /65 (BP 1 Location: Left arm, BP Patient Position: Sitting, BP Cuff Size: Large adult)   Pulse 65   Temp 97.3 °F (36.3 °C) (Temporal)   Resp 16   Ht 6' 2.5\" (1.892 m)   Wt 212 lb 9.6 oz (96.4 kg)   SpO2 99%   BMI 26.93 kg/m²     A comprehensive 5 year plan for medical care and screening exams was reviewed with pt and they received a copy of it. Follow-up and Dispositions    Return in about 3 months (around 10/29/2022) for labs 1 week before.            Dania Martinez MD

## 2022-07-29 NOTE — PATIENT INSTRUCTIONS
Medicare Wellness Visit, Male    The best way to live healthy is to have a lifestyle where you eat a well-balanced diet, exercise regularly, limit alcohol use, and quit all forms of tobacco/nicotine, if applicable. Regular preventive services are another way to keep healthy. Preventive services (vaccines, screening tests, monitoring & exams) can help personalize your care plan, which helps you manage your own care. Screening tests can find health problems at the earliest stages, when they are easiest to treat. [facility] follows the current, evidence-based guidelines published by the [facility] ([facility]) when recommending preventive services for our patients. Because we follow these guidelines, sometimes recommendations change over time as research supports it. (For example, a prostate screening blood test is no longer routinely recommended for men with no symptoms). Of course, you and your doctor may decide to screen more often for some diseases, based on your risk and co-morbidities (chronic disease you are already diagnosed with). Preventive services for you include:  - Medicare offers their members a free annual wellness visit, which is time for you and your primary care provider to discuss and plan for your preventive service needs. Take advantage of this benefit every year!  -All adults over age 72 should receive the recommended pneumonia vaccines. Current USPSTF guidelines recommend a series of two vaccines for the best pneumonia protection.   -All adults should have a flu vaccine yearly and tetanus vaccine every 10 years.  -All adults age 48 and older should receive the shingles vaccines (series of two vaccines).        -All adults age 38-68 who are overweight should have a diabetes screening test once every three years.   -Other screening tests & preventive services for persons with diabetes include: an eye exam to screen for diabetic retinopathy, a kidney function test, a foot exam, and stricter control over your cholesterol.   -Cardiovascular screening for adults with routine risk involves an electrocardiogram (ECG) at intervals determined by the provider.   -Colorectal cancer screening should be done for adults age 54-65 with no increased risk factors for colorectal cancer. There are a number of acceptable methods of screening for this type of cancer. Each test has its own benefits and drawbacks. Discuss with your provider what is most appropriate for you during your annual wellness visit. The different tests include: colonoscopy (considered the best screening method), a fecal occult blood test, a fecal DNA test, and sigmoidoscopy.  -All adults born between [facility] should be screened once for Hepatitis C.  -An Abdominal Aortic Aneurysm (AAA) Screening is recommended for men age 73-68 who has ever smoked in their lifetime.      Here is a list of your current Health Maintenance items (your personalized list of preventive services) with a due date:  Health Maintenance Due   Topic Date Due    Diabetic Foot Care  Never done    Albumin Urine Test  Never done    COVID-19 Vaccine (4 - Booster for Pfizer series) 02/04/2022

## 2022-09-26 ENCOUNTER — OFFICE VISIT (OUTPATIENT)
Dept: CARDIOLOGY CLINIC | Age: 82
End: 2022-09-26
Payer: MEDICARE

## 2022-09-26 VITALS
DIASTOLIC BLOOD PRESSURE: 82 MMHG | OXYGEN SATURATION: 97 % | WEIGHT: 210 LBS | HEIGHT: 75 IN | SYSTOLIC BLOOD PRESSURE: 138 MMHG | BODY MASS INDEX: 26.11 KG/M2 | HEART RATE: 69 BPM

## 2022-09-26 DIAGNOSIS — I21.02 MYOCARDIAL INFARCTION INVOLVING LEFT ANTERIOR DESCENDING (LAD) CORONARY ARTERY, UNSPECIFIED MI TYPE (HCC): ICD-10-CM

## 2022-09-26 DIAGNOSIS — I10 ESSENTIAL HYPERTENSION: ICD-10-CM

## 2022-09-26 DIAGNOSIS — E78.5 DYSLIPIDEMIA: ICD-10-CM

## 2022-09-26 DIAGNOSIS — Z95.0 CARDIAC PACEMAKER IN SITU: ICD-10-CM

## 2022-09-26 DIAGNOSIS — I49.5 SICK SINUS SYNDROME (HCC): ICD-10-CM

## 2022-09-26 DIAGNOSIS — I48.0 PAROXYSMAL ATRIAL FIBRILLATION (HCC): ICD-10-CM

## 2022-09-26 DIAGNOSIS — I25.10 CORONARY ARTERY DISEASE INVOLVING NATIVE CORONARY ARTERY OF NATIVE HEART WITHOUT ANGINA PECTORIS: Primary | ICD-10-CM

## 2022-09-26 PROCEDURE — 1101F PT FALLS ASSESS-DOCD LE1/YR: CPT | Performed by: INTERNAL MEDICINE

## 2022-09-26 PROCEDURE — 93000 ELECTROCARDIOGRAM COMPLETE: CPT | Performed by: INTERNAL MEDICINE

## 2022-09-26 PROCEDURE — 99214 OFFICE O/P EST MOD 30 MIN: CPT | Performed by: INTERNAL MEDICINE

## 2022-09-26 PROCEDURE — G8752 SYS BP LESS 140: HCPCS | Performed by: INTERNAL MEDICINE

## 2022-09-26 PROCEDURE — G8427 DOCREV CUR MEDS BY ELIG CLIN: HCPCS | Performed by: INTERNAL MEDICINE

## 2022-09-26 PROCEDURE — 93288 INTERROG EVL PM/LDLS PM IP: CPT | Performed by: INTERNAL MEDICINE

## 2022-09-26 PROCEDURE — 1123F ACP DISCUSS/DSCN MKR DOCD: CPT | Performed by: INTERNAL MEDICINE

## 2022-09-26 PROCEDURE — G8417 CALC BMI ABV UP PARAM F/U: HCPCS | Performed by: INTERNAL MEDICINE

## 2022-09-26 PROCEDURE — G8536 NO DOC ELDER MAL SCRN: HCPCS | Performed by: INTERNAL MEDICINE

## 2022-09-26 PROCEDURE — G8510 SCR DEP NEG, NO PLAN REQD: HCPCS | Performed by: INTERNAL MEDICINE

## 2022-09-26 PROCEDURE — G8754 DIAS BP LESS 90: HCPCS | Performed by: INTERNAL MEDICINE

## 2022-09-26 RX ORDER — SODIUM CHLORIDE 0.9 % (FLUSH) 0.9 %
5-40 SYRINGE (ML) INJECTION EVERY 8 HOURS
Status: CANCELLED | OUTPATIENT
Start: 2022-09-26

## 2022-09-26 RX ORDER — SODIUM CHLORIDE 0.9 % (FLUSH) 0.9 %
5-40 SYRINGE (ML) INJECTION AS NEEDED
Status: CANCELLED | OUTPATIENT
Start: 2022-09-26

## 2022-09-26 NOTE — PROGRESS NOTES
HISTORY OF PRESENT ILLNESS  Micky Benítez is a 79 y.o. male. Follow-up  Pertinent negatives include no chest pain, no abdominal pain, no headaches and no shortness of breath. Patient presents for a follow-up office visit. He has a past medical history significant for paroxysmal atrial fibrillation, hypertension, and sick sinus syndrome, status post dual-chamber permanent pacemaker in March 2011. More recently, he was diagnosed with coronary disease in 2021. He underwent an exercise nuclear stress test in February 2019 which was a normal low risk study. He exercised 8 minutes on the treadmill using the Raghu protocol without any EKG changes or symptoms. No ischemia. EF 72%. The patient was hospitalized in April 2021 with a non-ST elevation myocardial infarction. He presented with acute onset chest pain was found to have an elevated troponin which peaked at 1.45. He subsequently underwent a cardiac catheterization which demonstrated severe two-vessel coronary artery disease. His culprit lesion was a 99% ostial LAD which underwent angioplasty and stenting with a single drug-eluting stent. He is also found to have 85% mid LAD stenosis which underwent stenting with a second drug-eluting stent. He had a residual distal left circumflex 70% lesion as well with plan for staged intervention in the future. An echocardiogram during his hospital stay demonstrated preserved LVEF of 55 to 60% with grade 1 diastolic dysfunction, no valvular heart disease and normal PA pressures. Patient subsequently underwent a staged PCI to his distal left circumflex with a another single drug-eluting stent in May 2021 using a Xience 3.0 x 12 mm stent. He was last seen in our office 6 months ago. He stopped his Brilinta as instructed at the end of May 2022. He has been feeling quite well. No new heart palpitations, chest pain, shortness of breath, leg swelling, orthopnea or PND.     Past Medical History: Diagnosis Date    Atrial fibrillation (HCC)     CHADS 0  (-CHF, -HTN, -AGE, -DM, -CVA)    Cardiac echocardiogram 06/29/2010    EF 70-75%. Mild conc LVh. Gr 1 DDfx. Mild DELMY. Cardiac Holter monitor, normal 06/28/2010    Normal Holter study. Cardiac nuclear imaging test 11/22/2010    Sm area of apical ischemia and inferolateral scar, both possibly apical thinning. No WMA. EF 60%. Impotence of organic origin     Pacemaker     3/11  MEDTRONIC    Personal history of malignant neoplasm of prostate     T1a, [place] 6 (3+3)     Prostate cancer (HCC)     PUD (peptic ulcer disease)     Sick sinus syndrome ([address]     status post implantation of Medtronic dual-chamber permanent pacemaker 3/22/11. Unspecified essential hypertension     Venereal disease       Current Outpatient Medications   Medication Sig Dispense Refill    bromfenac (BROMDAY) 0.09 % ophthalmic solution       atorvastatin (LIPITOR) 20 mg tablet TAKE 1 TABLET NIGHTLY 90 Tablet 3    metoprolol succinate (TOPROL-XL) 50 mg XL tablet TAKE 1 TABLET DAILY 90 Tablet 3    aspirin 81 mg tablet Take 1 Tab by mouth daily. 30 Tab 0    nitroglycerin (NITROLINGUAL) 400 mcg/spray spray 1 Spray by SubLINGual route every five (5) minutes as needed for Chest Pain (Up to 3 maximum doses). 1 Bottle 0    multivitamin, tx-iron-ca-min (THERA-M w/ IRON) 9 mg iron-400 mcg tab tablet Take 1 Tab by mouth daily. fish oil-omega-3 fatty acids 340-1,000 mg capsule Take 1,000 mg by mouth.      latanoprost (XALATAN) 0.005 % ophthalmic solution Administer 1 Drop to both eyes daily. brimonidine (ALPHAGAN) 0.2 % ophthalmic solution Administer 1 Drop to both eyes two (2) times a day. ZINC MTH/COPPER/SAW PALM/GNSG (PROSTATE HEALTH FORMULA PO) Take 1 Tab by mouth daily.         Allergies   Allergen Reactions    Shellfish Containing Products Swelling and Other (comments)    Iodine Swelling      Social History     Tobacco Use    Smoking status: Former     Packs/day: 0.25     Years: 1.00     Pack years: 0.25     Types: Cigarettes     Quit date: 1981     Years since quittin.4    Smokeless tobacco: Never   Vaping Use    Vaping Use: Never used   Substance Use Topics    Alcohol use: No    Drug use: No       History reviewed. No pertinent family history. Review of Systems   Constitutional:  Negative for chills, fever and weight loss. HENT:  Negative for nosebleeds. Eyes:  Negative for blurred vision and double vision. Respiratory:  Negative for cough, shortness of breath and wheezing. Cardiovascular:  Negative for chest pain, palpitations, orthopnea, claudication, leg swelling and PND. Gastrointestinal:  Negative for abdominal pain, heartburn, nausea and vomiting. Genitourinary:  Negative for dysuria and hematuria. Musculoskeletal:  Negative for falls and myalgias. Skin:  Negative for rash. Neurological:  Negative for dizziness, focal weakness and headaches. Endo/Heme/Allergies:  Does not bruise/bleed easily. Psychiatric/Behavioral:  Negative for substance abuse. Visit Vitals  /82 (BP 1 Location: Left upper arm, BP Patient Position: Sitting, BP Cuff Size: Adult)   Pulse 69   Ht 6' 2.5\" (1.892 m)   Wt 95.3 kg (210 lb)   SpO2 97%   BMI 26.60 kg/m²      Physical Exam  Constitutional:       Appearance: He is well-developed. HENT:      Head: Normocephalic and atraumatic. Eyes:      Conjunctiva/sclera: Conjunctivae normal.   Neck:      Vascular: No carotid bruit or JVD. Cardiovascular:      Rate and Rhythm: Normal rate and regular rhythm. Pulses: Normal pulses. Heart sounds: S1 normal and S2 normal. No murmur heard. No gallop. No S3 sounds. Pulmonary:      Breath sounds: Normal breath sounds. No wheezing or rales. Abdominal:      General: Bowel sounds are normal.      Palpations: Abdomen is soft. Tenderness: There is no abdominal tenderness. Musculoskeletal:         General: No tenderness.       Cervical back: Neck supple. Left lower leg: Edema ( Trace) present. Skin:     General: Skin is warm and dry. Neurological:      General: No focal deficit present. Mental Status: He is alert and oriented to person, place, and time. Psychiatric:         Mood and Affect: Mood normal.         Behavior: Behavior normal.     EK% atrial paced rhythm, intrinsic QRS no ST-T wave changes concerning for ischemia. Compared to his previous EKG, no significant will change. Pacemaker interrogation. Battery life estimated at 1 month. No recurrent episodes of atrial fibrillation since last check. Pacing in the atrium 99 % in the ventricle less than 1%. Scanned document for details. ASSESSMENT and PLAN    History of Non-ST elevation myocardial infarction. This occurred in 2021. He was found to have a high-grade sequential lesions of his LAD with an ostial 99% lesion and a mid 85% lesion, both of which underwent angioplasty and stenting with a total of 2 drug-eluting stents. No regional wall motion abnormalities on his echocardiogram.  His troponin level went up to 1.45. He has been chest pain-free since his PCI. Patient stopped his Brilinta earlier this year. Coronary artery disease. Patient underwent PCI to his ostial mid LAD with a total of 2 drug-eluting stents in the setting of an acute non-ST elevation myocardial infarction in 2021. He then underwent a staged PCI to his 70% distal left circumflex stenosis with a single drug-eluting stent. Patient is currently taking an aspirin, potent statin and beta-blocker. No new symptoms concerning for angina. Paroxysmal atrial fibrillation. Patient with his last episode occurring in 2022 lasting approximately 15 hours. No high rate episodes. His previous episode was in 2021. If his episodes become more frequent, I would recommend starting oral anticoagulation with Eliquis. Sick sinus syndrome.  Status post dual-chamber permanent pacemaker. Normal device function on interrogation. The patient continues to pace the majority of the time in the atrium and 0% in the ventricle. Battery life is now down to only 1 month. A pacemaker generator exchange will be scheduled at his convenience for next month. Essential hypertension. Patient blood pressure is now controlled on metoprolol as monotherapy. Dyslipidemia. Patient continues to take atorvastatin 20 mg daily. A recent lipid profile looked excellent on this dosage. Labs from July 2022: Total cholesterol 98, HDL 37, LDL 51, triglycerides 48. He can continue this regimen.     Follow-up to be scheduled following his pacemaker generator exchange

## 2022-09-26 NOTE — PATIENT INSTRUCTIONS
[facility]   Patient  EP Instructions  [address] [place] [address], [address]                You are scheduled to have a Medtronic dual chamber pacer gen change on  October 12th , at 09:15am.     Please check in at 07:45am.     Please go to [facility] and park in the outpatient parking lot that is located around to the back of the hospital and enter through the [facility]. Once you enter through the [facility] check in with the  there. The  will either give you directions or assist you in getting to the cath holding area. 3.  You are not to eat or drink anything after midnight the night before your procedure. Please continue to take your medications with a small sip of water on the morning of the procedure      If you are diabetic, do not take your insulin/sugar pill the morning of the procedure. We encourage families to wait in the waiting room on the first floor while the procedure is being done. The Doctor will come out and talk with you as soon as the procedure is over. You will not be able to drive yourself home after your procedure is done. There is the possibility that you may spend the night in the hospital, depending on the results of the procedure. This will be determined after the procedure is done. 8.   If you or your family have any questions, please call our office Monday-Friday 8:30 am - 4:30 pm , at [phone], and ask to speak to one of the nurses. Post Implant Instructions for Pacemakers,AICD, and BIV devices. You may remove the big bulky bandage from the incision site after 24-48 hours. Keep the incision clean and dry for another 2 days. After that, it is ok to get the incision wet but do not soak or scrub the incision. Pat the area dry with a clean towel.   The small steri-strips will come off in 10-14 days - if after 14 days, they have not come off, then please go ahead and remove them.                                     No swimming, soaking in bath tub or hot tubs for 2 weeks    You will be scheduled an appointment in our office in 7-10 days for a wound check. This visit will be with one of the nurses. If you develop a fever over 100.5 F , note drainage from the incision or if you develop increased pain or swelling at the incision site or pain or swelling in the arm, PLEASE CALL the office at [phone] and speak with one of the nurses. For the next two weeks, please do not raise your arm (on the side the device was placed) above your shoulder and do not lift/carry more than 5 pounds with that arm. If is ok to use your arm otherwise. You should not play golf, tennis, swim using an overhead stroke, or engage in any activity that will involve repetitive movement of your arm for 6 weeks. Weight lifting with that arm (chest fly, chest presses, etc.) can increase the risk of damage to one of the leads (wires). IF YOU ARE SCHEDULED FOR ANY SURGICAL PROCEDURE, PLEASE MAKE SURE THE DOCTOR IS AWARE THAT YOU HAVE A PACEMAKER/AICD. You will be scheduled to have a pacemaker/AICD check and a physician visit in the next 3 months. After that, you will typically return to the office for a device ck yearly. These visits may or may not involve a physician visit as well. Please do not hesitate to call the office at [phone] if you have any questions or concerns. Unless you were instructed otherwise, please continue all of the medications you were on prior to the procedure.        Please have labs and chest x-ray done 1 week prior to procedure  Follow up in 1 week after procedure for wound and device check  Follow up with Dr. Hemalatha Vaughn in 3 months after procedure

## 2022-09-26 NOTE — LETTER
9/26/2022 10:18 AM    Ekaterinakenisha Greene  [id number]  1940        Insurance:  Medicare                 Auth # _____________________      Proc Date: Wed 10/12                Proc Time:  9:15      Performing MD : Dr. Becky Nina                      Procedure:Dual Pacemaker Gen Change                                         Scheduled with:  Daisy                PCP:Yolanda                                               Date:9/26/2022          HP: 9/26      EKG: ______    Labs:______  CXR: _______  Orders:  9/26          Special Instructions:  _____________________________________________________  ______________________________________________________________________  ______________________________________________________________________          The materials enclosed with this facsimile transmission are private and confidential and are the property of the sender. If you are not the intended recipient, be advised that any unauthorized use, disclosure, copying, distribution, or the taking of any action in reliance on the contents of this telecopied information is strictly prohibited. If you have received this in error, please immediately notify the sender via telephone to arrange for return of the forwarded documentation.

## 2022-09-26 NOTE — PROGRESS NOTES
Kaitlin Monroy presents today for   Chief Complaint   Patient presents with    Follow-up     6 month    Pacemaker [address]epad 139 preferred language for health care discussion is english/other. Is someone accompanying this pt? no    Is the patient using any DME equipment during [address]? no    Depression Screening:  3 most recent PHQ Screens 9/26/2022   Little interest or pleasure in doing things Not at all   Feeling down, depressed, irritable, or hopeless Not at all   Total Score PHQ 2 0   Trouble falling or staying asleep, or sleeping too much -   Feeling tired or having little energy -   Poor appetite, weight loss, or overeating -   Feeling bad about yourself - or that you are a failure or have let yourself or your family down -   Trouble concentrating on things such as school, work, reading, or watching TV -   Moving or speaking so slowly that other people could have noticed; or the opposite being so fidgety that others notice -   Thoughts of being better off dead, or hurting yourself in some way -   PHQ 9 Score -   How difficult have these problems made it for you to do your work, take care of your home and get along with others -       Learning Assessment:  Learning Assessment 9/26/2022   PRIMARY LEARNER Patient   HIGHEST LEVEL OF EDUCATION - PRIMARY LEARNER  -   BARRIERS PRIMARY LEARNER -   CO-LEARNER CAREGIVER -   PRIMARY LANGUAGE ENGLISH   LEARNER PREFERENCE PRIMARY DEMONSTRATION     -   ANSWERED BY patient   RELATIONSHIP SELF       Abuse Screening:  Abuse Screening Questionnaire 9/26/2022   Do you ever feel afraid of your partner? N   Are you in a relationship with someone who physically or mentally threatens you? N   Is it safe for you to go home? Y       Fall Risk  Fall Risk Assessment, last 12 mths 9/26/2022   Able to walk? Yes   Fall in past 12 months? 0   Do you feel unsteady? 0   Are you worried about falling 0   Is TUG test greater than 12 seconds?  -   Is the gait abnormal? -   Number of falls in past 12 months -   Fall with injury? -           Pt currently taking Anticoagulant therapy? no    Pt currently taking Antiplatelet therapy ? Aspirin 81 mg daily      Coordination of Care:  1. Have you been to the ER, urgent care clinic since your last visit? Hospitalized since your last visit? no    2. Have you seen or consulted any other health care providers outside of the [address] [place] [address] since your last visit? Include any pap smears or colon screening.  no

## 2022-10-04 ENCOUNTER — HOSPITAL ENCOUNTER (OUTPATIENT)
Dept: LAB | Age: 82
Discharge: HOME | End: 2022-10-04
Payer: MEDICARE

## 2022-10-04 ENCOUNTER — HOSPITAL ENCOUNTER (OUTPATIENT)
Dept: GENERAL RADIOLOGY | Age: 82
Discharge: HOME | End: 2022-10-04
Payer: MEDICARE

## 2022-10-04 DIAGNOSIS — I25.10 CORONARY ARTERY DISEASE INVOLVING NATIVE CORONARY ARTERY OF NATIVE HEART WITHOUT ANGINA PECTORIS: ICD-10-CM

## 2022-10-04 DIAGNOSIS — I48.0 PAROXYSMAL ATRIAL FIBRILLATION (HCC): ICD-10-CM

## 2022-10-04 DIAGNOSIS — Z95.0 CARDIAC PACEMAKER IN SITU: ICD-10-CM

## 2022-10-04 LAB
ANION GAP SERPL CALC-SCNC: 2 MMOL/L (ref 3–18)
BASOPHILS # BLD: 0.2 K/UL (ref 0–0.1)
BASOPHILS NFR BLD: 2 % (ref 0–2)
BUN SERPL-MCNC: 15 MG/DL (ref 7–18)
BUN/CREAT SERPL: 14 (ref 12–20)
CALCIUM SERPL-MCNC: 9.2 MG/DL (ref 8.5–10.1)
CHLORIDE SERPL-SCNC: 106 MMOL/L (ref 100–111)
CO2 SERPL-SCNC: 31 MMOL/L (ref 21–32)
CREAT SERPL-MCNC: 1.1 MG/DL (ref 0.6–1.3)
DIFFERENTIAL METHOD BLD: ABNORMAL
EOSINOPHIL # BLD: 0 K/UL (ref 0–0.4)
EOSINOPHIL NFR BLD: 0 % (ref 0–5)
ERYTHROCYTE [DISTWIDTH] IN BLOOD BY AUTOMATED COUNT: 12.6 % (ref 11.6–14.5)
GLUCOSE SERPL-MCNC: 102 MG/DL (ref 74–99)
HCT VFR BLD AUTO: 41.8 % (ref 36–48)
HGB BLD-MCNC: 13.7 G/DL (ref 13–16)
IMM GRANULOCYTES # BLD AUTO: 0 K/UL
IMM GRANULOCYTES NFR BLD AUTO: 0 %
INR PPP: 1.3 (ref 0.8–1.2)
LYMPHOCYTES # BLD: 0.6 K/UL (ref 0.9–3.6)
LYMPHOCYTES NFR BLD: 7 % (ref 21–52)
MCH RBC QN AUTO: 28.8 PG (ref 24–34)
MCHC RBC AUTO-ENTMCNC: 32.8 G/DL (ref 31–37)
MCV RBC AUTO: 87.8 FL (ref 78–100)
MONOCYTES # BLD: 2.9 K/UL (ref 0.05–1.2)
MONOCYTES NFR BLD: 32 % (ref 3–10)
NEUTS SEG # BLD: 5.5 K/UL (ref 1.8–8)
NEUTS SEG NFR BLD: 59 % (ref 40–73)
NRBC # BLD: 0 K/UL (ref 0–0.01)
NRBC BLD-RTO: 0 PER 100 WBC
PLATELET # BLD AUTO: 99 K/UL (ref 135–420)
PLATELET COMMENTS,PCOM: ABNORMAL
PMV BLD AUTO: 12 FL (ref 9.2–11.8)
POTASSIUM SERPL-SCNC: 4.5 MMOL/L (ref 3.5–5.5)
PROTHROMBIN TIME: 16.2 SEC (ref 11.5–15.2)
RBC # BLD AUTO: 4.76 M/UL (ref 4.35–5.65)
RBC MORPH BLD: ABNORMAL
SODIUM SERPL-SCNC: 139 MMOL/L (ref 136–145)
WBC # BLD AUTO: 9.2 K/UL (ref 4.6–13.2)

## 2022-10-04 PROCEDURE — 85025 COMPLETE CBC W/AUTO DIFF WBC: CPT

## 2022-10-04 PROCEDURE — 80048 BASIC METABOLIC PNL TOTAL CA: CPT

## 2022-10-04 PROCEDURE — 36415 COLL VENOUS BLD VENIPUNCTURE: CPT

## 2022-10-04 PROCEDURE — 85610 PROTHROMBIN TIME: CPT

## 2022-10-04 PROCEDURE — 71046 X-RAY EXAM CHEST 2 VIEWS: CPT

## 2022-10-12 ENCOUNTER — HOSPITAL ENCOUNTER (OUTPATIENT)
Age: 82
Setting detail: OUTPATIENT SURGERY
Discharge: HOME | End: 2022-10-12
Attending: INTERNAL MEDICINE | Admitting: INTERNAL MEDICINE
Payer: MEDICARE

## 2022-10-12 VITALS
BODY MASS INDEX: 25.99 KG/M2 | SYSTOLIC BLOOD PRESSURE: 130 MMHG | HEIGHT: 75 IN | DIASTOLIC BLOOD PRESSURE: 94 MMHG | HEART RATE: 60 BPM | WEIGHT: 209 LBS | OXYGEN SATURATION: 98 % | RESPIRATION RATE: 16 BRPM | TEMPERATURE: 97.7 F

## 2022-10-12 DIAGNOSIS — Z45.010 ENCOUNTER FOR PACEMAKER AT END OF BATTERY LIFE: ICD-10-CM

## 2022-10-12 PROCEDURE — 33228 REMV&REPLC PM GEN DUAL LEAD: CPT | Performed by: INTERNAL MEDICINE

## 2022-10-12 PROCEDURE — 77030022017 HC DRSG HEMO QCLOT ZMED -A: Performed by: INTERNAL MEDICINE

## 2022-10-12 PROCEDURE — 99153 MOD SED SAME PHYS/QHP EA: CPT | Performed by: INTERNAL MEDICINE

## 2022-10-12 PROCEDURE — 77030018729 HC ELECTRD DEFIB PAD CARD -B: Performed by: INTERNAL MEDICINE

## 2022-10-12 PROCEDURE — 77030028698 HC BLD TISS PLSM MEDT -D: Performed by: INTERNAL MEDICINE

## 2022-10-12 PROCEDURE — 74011000250 HC RX REV CODE- 250: Performed by: INTERNAL MEDICINE

## 2022-10-12 PROCEDURE — 99024 POSTOP FOLLOW-UP VISIT: CPT | Performed by: INTERNAL MEDICINE

## 2022-10-12 PROCEDURE — 77030019580 HC CBL PACE MEDT -B: Performed by: INTERNAL MEDICINE

## 2022-10-12 PROCEDURE — 74011250636 HC RX REV CODE- 250/636: Performed by: INTERNAL MEDICINE

## 2022-10-12 PROCEDURE — 77030031139 HC SUT VCRL2 J&J -A: Performed by: INTERNAL MEDICINE

## 2022-10-12 PROCEDURE — 77030040375: Performed by: INTERNAL MEDICINE

## 2022-10-12 PROCEDURE — C1785 PMKR, DUAL, RATE-RESP: HCPCS | Performed by: INTERNAL MEDICINE

## 2022-10-12 PROCEDURE — 99152 MOD SED SAME PHYS/QHP 5/>YRS: CPT | Performed by: INTERNAL MEDICINE

## 2022-10-12 DEVICE — PCMKR AZURE XT DR MRI --: Type: IMPLANTABLE DEVICE | Status: FUNCTIONAL

## 2022-10-12 RX ORDER — SODIUM CHLORIDE 0.9 % (FLUSH) 0.9 %
5-40 SYRINGE (ML) INJECTION EVERY 8 HOURS
Status: CANCELLED | OUTPATIENT
Start: 2022-10-12

## 2022-10-12 RX ORDER — ACETAMINOPHEN 325 MG/1
650 TABLET ORAL
Status: CANCELLED | OUTPATIENT
Start: 2022-10-12

## 2022-10-12 RX ORDER — HYDROCODONE BITARTRATE AND ACETAMINOPHEN 5; 325 MG/1; MG/1
1 TABLET ORAL
Status: CANCELLED | OUTPATIENT
Start: 2022-10-12

## 2022-10-12 RX ORDER — LIDOCAINE HYDROCHLORIDE 10 MG/ML
INJECTION, SOLUTION EPIDURAL; INFILTRATION; INTRACAUDAL; PERINEURAL AS NEEDED
Status: DISCONTINUED | OUTPATIENT
Start: 2022-10-12 | End: 2022-10-12 | Stop reason: HOSPADM

## 2022-10-12 RX ORDER — ONDANSETRON 2 MG/ML
4 INJECTION INTRAMUSCULAR; INTRAVENOUS
Status: CANCELLED | OUTPATIENT
Start: 2022-10-12

## 2022-10-12 RX ORDER — MIDAZOLAM HYDROCHLORIDE 1 MG/ML
INJECTION, SOLUTION INTRAMUSCULAR; INTRAVENOUS AS NEEDED
Status: DISCONTINUED | OUTPATIENT
Start: 2022-10-12 | End: 2022-10-12 | Stop reason: HOSPADM

## 2022-10-12 RX ORDER — SODIUM CHLORIDE 0.9 % (FLUSH) 0.9 %
5-40 SYRINGE (ML) INJECTION AS NEEDED
Status: CANCELLED | OUTPATIENT
Start: 2022-10-12

## 2022-10-12 RX ORDER — FENTANYL CITRATE 50 UG/ML
INJECTION, SOLUTION INTRAMUSCULAR; INTRAVENOUS AS NEEDED
Status: DISCONTINUED | OUTPATIENT
Start: 2022-10-12 | End: 2022-10-12 | Stop reason: HOSPADM

## 2022-10-12 NOTE — Clinical Note
TRANSFER - IN REPORT:     Verbal report received from: Sylwia EAGLE. Report consisted of patient's Situation, Background, Assessment and   Recommendations(SBAR). Opportunity for questions and clarification was provided. Assessment completed upon patient's arrival to unit and care assumed. Patient transported with a Registered Nurse.

## 2022-10-12 NOTE — Clinical Note
A Bovie was used. Blend setting: blend. Mode: bipolar. Coagulation Settin. Cut Settin. Site (pad location): lateral thigh. Laterality: left.

## 2022-10-12 NOTE — DISCHARGE INSTRUCTIONS
DISCHARGE SUMMARY from Nurse: PATIENT INSTRUCTIONS:    After general anesthesia or intravenous sedation, for 24 hours or while taking prescription Narcotics:  Limit your activities  Do not drive and operate hazardous machinery  Do not make important personal or business decisions  Do  not drink alcoholic beverages  If you have not urinated within 8 hours after discharge, please contact your surgeon on call. Report the following to your surgeon:  Excessive pain, swelling, redness or odor of or around the surgical area  Temperature over 100.5  Nausea and vomiting lasting longer than 4 hours or if unable to take medications  Any signs of decreased circulation or nerve impairment to extremity: change in color, persistent  numbness, tingling, coldness or increase pain  Any questions    What to do at Home:  Recommended activity: activity as tolerated and no driving for today. If you experience any symptoms of infection or bleeding, please follow up with *** or your Primary Care Physician. Please give a list of your current medications to your Primary Care Provider. Please update this list whenever your medications are discontinued, doses are changed, or new medications (including over-the-counter products) are added. Please carry medication information at all times in case of emergency situations. These are general instructions for a healthy lifestyle:  No smoking/ No tobacco products/ Avoid exposure to second hand smoke  Surgeon General's Warning:  Quitting smoking now greatly reduces serious risk to your health. Obesity, smoking, and sedentary lifestyle greatly increases your risk for illness  A healthy diet, regular physical exercise & weight monitoring are important for maintaining a healthy lifestyle.   You may be retaining fluid if you have a history of heart failure or if you experience any of the following symptoms:  Weight gain of 3 pounds or more overnight or 5 pounds in a week, increased swelling in our hands or feet or shortness of breath while lying flat in bed. Please call your doctor as soon as you notice any of these symptoms; do not wait until your next office visit. The discharge information has been reviewed with the patient. The patient verbalized understanding. Discharge medications reviewed with the patient and appropriate educational materials and side effects teaching were provided.   _________________________________________________________________________________

## 2022-10-12 NOTE — H&P
HISTORY OF PRESENT ILLNESS  Zabrina Day is a 79 y.o. male. Follow-up  Pertinent negatives include no chest pain, no abdominal pain, no headaches and no shortness of breath. Patient presents for a follow-up office visit. He has a past medical history significant for paroxysmal atrial fibrillation, hypertension, and sick sinus syndrome, status post dual-chamber permanent pacemaker in March 2011. More recently, he was diagnosed with coronary disease in 2021. He underwent an exercise nuclear stress test in February 2019 which was a normal low risk study. He exercised 8 minutes on the treadmill using the Raghu protocol without any EKG changes or symptoms. No ischemia. EF 72%. The patient was hospitalized in April 2021 with a non-ST elevation myocardial infarction. He presented with acute onset chest pain was found to have an elevated troponin which peaked at 1.45. He subsequently underwent a cardiac catheterization which demonstrated severe two-vessel coronary artery disease. His culprit lesion was a 99% ostial LAD which underwent angioplasty and stenting with a single drug-eluting stent. He is also found to have 85% mid LAD stenosis which underwent stenting with a second drug-eluting stent. He had a residual distal left circumflex 70% lesion as well with plan for staged intervention in the future. An echocardiogram during his hospital stay demonstrated preserved LVEF of 55 to 60% with grade 1 diastolic dysfunction, no valvular heart disease and normal PA pressures. Patient subsequently underwent a staged PCI to his distal left circumflex with a another single drug-eluting stent in May 2021 using a Xience 3.0 x 12 mm stent. He was last seen in our office 6 months ago. He stopped his Brilinta as instructed at the end of May 2022. He has been feeling quite well. No new heart palpitations, chest pain, shortness of breath, leg swelling, orthopnea or PND.           Past Medical History:   Diagnosis Date    Atrial fibrillation (HCC)       CHADS 0  (-CHF, -HTN, -AGE, -DM, -CVA)    Cardiac echocardiogram 06/29/2010     EF 70-75%. Mild conc LVh. Gr 1 DDfx. Mild DELMY. Cardiac Holter monitor, normal 06/28/2010     Normal Holter study. Cardiac nuclear imaging test 11/22/2010     Sm area of apical ischemia and inferolateral scar, both possibly apical thinning. No WMA. EF 60%. Impotence of organic origin      Pacemaker       3/11  MEDTRONIC    Personal history of malignant neoplasm of prostate       T1a, [place] 6 (3+3)     Prostate cancer (HCC)      PUD (peptic ulcer disease)      Sick sinus syndrome ([address]       status post implantation of Medtronic dual-chamber permanent pacemaker 3/22/11. Unspecified essential hypertension      Venereal disease               Current Outpatient Medications   Medication Sig Dispense Refill    bromfenac (BROMDAY) 0.09 % ophthalmic solution          atorvastatin (LIPITOR) 20 mg tablet TAKE 1 TABLET NIGHTLY 90 Tablet 3    metoprolol succinate (TOPROL-XL) 50 mg XL tablet TAKE 1 TABLET DAILY 90 Tablet 3    aspirin 81 mg tablet Take 1 Tab by mouth daily. 30 Tab 0    nitroglycerin (NITROLINGUAL) 400 mcg/spray spray 1 Spray by SubLINGual route every five (5) minutes as needed for Chest Pain (Up to 3 maximum doses). 1 Bottle 0    multivitamin, tx-iron-ca-min (THERA-M w/ IRON) 9 mg iron-400 mcg tab tablet Take 1 Tab by mouth daily. fish oil-omega-3 fatty acids 340-1,000 mg capsule Take 1,000 mg by mouth.        latanoprost (XALATAN) 0.005 % ophthalmic solution Administer 1 Drop to both eyes daily. brimonidine (ALPHAGAN) 0.2 % ophthalmic solution Administer 1 Drop to both eyes two (2) times a day. ZINC MTH/COPPER/SAW PALM/GNSG (PROSTATE HEALTH FORMULA PO) Take 1 Tab by mouth daily.                Allergies   Allergen Reactions    Shellfish Containing Products Swelling and Other (comments)    Iodine Swelling      Social History Tobacco Use    Smoking status: Former       Packs/day: 0.25       Years: 1.00       Pack years: 0.25       Types: Cigarettes       Quit date: 1981       Years since quittin.4    Smokeless tobacco: Never   Vaping Use    Vaping Use: Never used   Substance Use Topics    Alcohol use: No    Drug use: No       History reviewed. No pertinent family history. Review of Systems   Constitutional:  Negative for chills, fever and weight loss. HENT:  Negative for nosebleeds. Eyes:  Negative for blurred vision and double vision. Respiratory:  Negative for cough, shortness of breath and wheezing. Cardiovascular:  Negative for chest pain, palpitations, orthopnea, claudication, leg swelling and PND. Gastrointestinal:  Negative for abdominal pain, heartburn, nausea and vomiting. Genitourinary:  Negative for dysuria and hematuria. Musculoskeletal:  Negative for falls and myalgias. Skin:  Negative for rash. Neurological:  Negative for dizziness, focal weakness and headaches. Endo/Heme/Allergies:  Does not bruise/bleed easily. Psychiatric/Behavioral:  Negative for substance abuse. Visit Vitals  /82 (BP 1 Location: Left upper arm, BP Patient Position: Sitting, BP Cuff Size: Adult)   Pulse 69   Ht 6' 2.5\" (1.892 m)   Wt 95.3 kg (210 lb)   SpO2 97%   BMI 26.60 kg/m²      Physical Exam  Constitutional:       Appearance: He is well-developed. HENT:      Head: Normocephalic and atraumatic. Eyes:      Conjunctiva/sclera: Conjunctivae normal.   Neck:      Vascular: No carotid bruit or JVD. Cardiovascular:      Rate and Rhythm: Normal rate and regular rhythm. Pulses: Normal pulses. Heart sounds: S1 normal and S2 normal. No murmur heard. No gallop. No S3 sounds. Pulmonary:      Breath sounds: Normal breath sounds. No wheezing or rales. Abdominal:      General: Bowel sounds are normal.      Palpations: Abdomen is soft. Tenderness: There is no abdominal tenderness. Musculoskeletal:         General: No tenderness. Cervical back: Neck supple. Left lower leg: Edema ( Trace) present. Skin:     General: Skin is warm and dry. Neurological:      General: No focal deficit present. Mental Status: He is alert and oriented to person, place, and time. Psychiatric:         Mood and Affect: Mood normal.         Behavior: Behavior normal.      EK% atrial paced rhythm, intrinsic QRS no ST-T wave changes concerning for ischemia. Compared to his previous EKG, no significant will change. Pacemaker interrogation. Battery life estimated at 1 month. No recurrent episodes of atrial fibrillation since last check. Pacing in the atrium 99 % in the ventricle less than 1%. Scanned document for details. ASSESSMENT and PLAN     Sick sinus syndrome. Status post dual-chamber permanent pacemaker. .  The patient continues to pace the majority of the time in the atrium and 0% in the ventricle. Existing pacemaker generator battery has now reached the elective replacement indicator. We will proceed with dual-chamber pacemaker generator exchange today as scheduled. Labs reviewed. All questions answered. Patient willing to proceed with procedure as scheduled. History of Non-ST elevation myocardial infarction. This occurred in 2021. He was found to have a high-grade sequential lesions of his LAD with an ostial 99% lesion and a mid 85% lesion, both of which underwent angioplasty and stenting with a total of 2 drug-eluting stents. No regional wall motion abnormalities on his echocardiogram.   He has been chest pain-free since his PCI. Patient stopped his Brilinta earlier this year. Coronary artery disease. Patient underwent PCI to his ostial mid LAD with a total of 2 drug-eluting stents in the setting of an acute non-ST elevation myocardial infarction in 2021.   He then underwent a staged PCI to his 70% distal left circumflex stenosis with a single drug-eluting stent. Patient is currently taking an aspirin, potent statin and beta-blocker. No new symptoms concerning for angina. Paroxysmal atrial fibrillation. Patient with his last episode occurring in January 2022 lasting approximately 15 hours. No high rate episodes. His previous episode was in April 2021. If his episodes become more frequent, I would recommend starting oral anticoagulation with Eliquis. Essential hypertension. Patient blood pressure is now controlled on metoprolol as monotherapy. Dyslipidemia. Patient continues to take atorvastatin 20 mg daily. A recent lipid profile looked excellent on this dosage. Labs from July 2022: Total cholesterol 98, HDL 37, LDL 51, triglycerides 48. He can continue this regimen.

## 2022-10-12 NOTE — Clinical Note
TRANSFER - OUT REPORT:     Verbal report given to: Mik De Los Santos RN. Report consisted of patient's Situation, Background, Assessment and   Recommendations(SBAR). Opportunity for questions and clarification was provided. Patient transported with a Registered Nurse. Patient transported to: holding area.

## 2022-10-20 ENCOUNTER — CLINICAL SUPPORT (OUTPATIENT)
Dept: CARDIOLOGY CLINIC | Age: 82
End: 2022-10-20
Payer: MEDICARE

## 2022-10-20 DIAGNOSIS — I49.5 SICK SINUS SYNDROME (HCC): Primary | ICD-10-CM

## 2022-10-20 DIAGNOSIS — Z95.0 CARDIAC PACEMAKER IN SITU: ICD-10-CM

## 2022-10-20 PROCEDURE — 93288 INTERROG EVL PM/LDLS PM IP: CPT | Performed by: INTERNAL MEDICINE

## 2022-10-25 NOTE — PROGRESS NOTES
Dual chamber pacer interrogation and wound check s/p implant on 10/12/22. Battery longevity is 13.3 years.    AP 97.9 %,  <0.1 %    Thresholds, Impedances, and sensing all remain stable. No arrhythmias recorded. The nurse who performed the wound check will document separate.

## 2022-10-28 ENCOUNTER — APPOINTMENT (OUTPATIENT)
Dept: INTERNAL MEDICINE CLINIC | Age: 82
End: 2022-10-28

## 2022-10-28 ENCOUNTER — HOSPITAL ENCOUNTER (OUTPATIENT)
Dept: LAB | Age: 82
Discharge: HOME | End: 2022-10-28
Payer: MEDICARE

## 2022-10-28 DIAGNOSIS — E78.5 DYSLIPIDEMIA: ICD-10-CM

## 2022-10-28 DIAGNOSIS — E11.9 TYPE 2 DIABETES MELLITUS WITHOUT COMPLICATION, WITHOUT LONG-TERM CURRENT USE OF INSULIN (HCC): ICD-10-CM

## 2022-10-28 DIAGNOSIS — I10 ESSENTIAL HYPERTENSION: ICD-10-CM

## 2022-10-28 LAB
ALBUMIN SERPL-MCNC: 4.2 G/DL (ref 3.4–5)
ALBUMIN/GLOB SERPL: 1.4 {RATIO} (ref 0.8–1.7)
ALP SERPL-CCNC: 88 U/L (ref 45–117)
ALT SERPL-CCNC: 27 U/L (ref 16–61)
ANION GAP SERPL CALC-SCNC: 7 MMOL/L (ref 3–18)
AST SERPL-CCNC: 26 U/L (ref 10–38)
BILIRUB SERPL-MCNC: 1 MG/DL (ref 0.2–1)
BUN SERPL-MCNC: 16 MG/DL (ref 7–18)
BUN/CREAT SERPL: 13 (ref 12–20)
CALCIUM SERPL-MCNC: 9.1 MG/DL (ref 8.5–10.1)
CHLORIDE SERPL-SCNC: 107 MMOL/L (ref 100–111)
CHOLEST SERPL-MCNC: 100 MG/DL
CO2 SERPL-SCNC: 27 MMOL/L (ref 21–32)
CREAT SERPL-MCNC: 1.19 MG/DL (ref 0.6–1.3)
GLOBULIN SER CALC-MCNC: 3 G/DL (ref 2–4)
GLUCOSE SERPL-MCNC: 93 MG/DL (ref 74–99)
HBA1C MFR BLD: 6.3 % (ref 4.2–5.6)
HDLC SERPL-MCNC: 47 MG/DL (ref 40–60)
HDLC SERPL: 2.1 {RATIO} (ref 0–5)
LDLC SERPL CALC-MCNC: 43.6 MG/DL (ref 0–100)
LIPID PROFILE,FLP: NORMAL
POTASSIUM SERPL-SCNC: 4.6 MMOL/L (ref 3.5–5.5)
PROT SERPL-MCNC: 7.2 G/DL (ref 6.4–8.2)
SODIUM SERPL-SCNC: 141 MMOL/L (ref 136–145)
TRIGL SERPL-MCNC: 47 MG/DL (ref ?–150)
VLDLC SERPL CALC-MCNC: 9.4 MG/DL

## 2022-10-28 PROCEDURE — 80053 COMPREHEN METABOLIC PANEL: CPT

## 2022-10-28 PROCEDURE — 83036 HEMOGLOBIN GLYCOSYLATED A1C: CPT

## 2022-10-28 PROCEDURE — 80061 LIPID PANEL: CPT

## 2022-10-28 PROCEDURE — 36415 COLL VENOUS BLD VENIPUNCTURE: CPT

## 2022-11-04 ENCOUNTER — OFFICE VISIT (OUTPATIENT)
Dept: INTERNAL MEDICINE CLINIC | Age: 82
End: 2022-11-04
Payer: MEDICARE

## 2022-11-04 VITALS
HEIGHT: 75 IN | TEMPERATURE: 98.4 F | RESPIRATION RATE: 16 BRPM | SYSTOLIC BLOOD PRESSURE: 128 MMHG | WEIGHT: 206 LBS | DIASTOLIC BLOOD PRESSURE: 77 MMHG | OXYGEN SATURATION: 100 % | BODY MASS INDEX: 25.61 KG/M2 | HEART RATE: 63 BPM

## 2022-11-04 DIAGNOSIS — E78.5 DYSLIPIDEMIA: ICD-10-CM

## 2022-11-04 DIAGNOSIS — E11.9 TYPE 2 DIABETES MELLITUS WITHOUT COMPLICATION, WITHOUT LONG-TERM CURRENT USE OF INSULIN (HCC): Primary | ICD-10-CM

## 2022-11-04 DIAGNOSIS — I25.118 CORONARY ARTERY DISEASE OF NATIVE ARTERY OF NATIVE HEART WITH STABLE ANGINA PECTORIS (HCC): ICD-10-CM

## 2022-11-04 DIAGNOSIS — I10 ESSENTIAL HYPERTENSION: ICD-10-CM

## 2022-11-04 PROCEDURE — G0463 HOSPITAL OUTPT CLINIC VISIT: HCPCS | Performed by: INTERNAL MEDICINE

## 2022-11-04 PROCEDURE — 1101F PT FALLS ASSESS-DOCD LE1/YR: CPT | Performed by: INTERNAL MEDICINE

## 2022-11-04 PROCEDURE — 99214 OFFICE O/P EST MOD 30 MIN: CPT | Performed by: INTERNAL MEDICINE

## 2022-11-04 PROCEDURE — 1123F ACP DISCUSS/DSCN MKR DOCD: CPT | Performed by: INTERNAL MEDICINE

## 2022-11-04 PROCEDURE — G8536 NO DOC ELDER MAL SCRN: HCPCS | Performed by: INTERNAL MEDICINE

## 2022-11-04 PROCEDURE — G8752 SYS BP LESS 140: HCPCS | Performed by: INTERNAL MEDICINE

## 2022-11-04 PROCEDURE — G8427 DOCREV CUR MEDS BY ELIG CLIN: HCPCS | Performed by: INTERNAL MEDICINE

## 2022-11-04 PROCEDURE — 3078F DIAST BP <80 MM HG: CPT | Performed by: INTERNAL MEDICINE

## 2022-11-04 PROCEDURE — 3044F HG A1C LEVEL LT 7.0%: CPT | Performed by: INTERNAL MEDICINE

## 2022-11-04 PROCEDURE — G8510 SCR DEP NEG, NO PLAN REQD: HCPCS | Performed by: INTERNAL MEDICINE

## 2022-11-04 PROCEDURE — G8417 CALC BMI ABV UP PARAM F/U: HCPCS | Performed by: INTERNAL MEDICINE

## 2022-11-04 PROCEDURE — 3074F SYST BP LT 130 MM HG: CPT | Performed by: INTERNAL MEDICINE

## 2022-11-04 PROCEDURE — G8754 DIAS BP LESS 90: HCPCS | Performed by: INTERNAL MEDICINE

## 2022-11-04 NOTE — PROGRESS NOTES
Patient is in the office today for a 3 month follow up. Do you have an Advance Directive no  Do you want more information : information given     1. \"Have you been to the ER, urgent care clinic since your last visit? Hospitalized since your last visit? \" No    2. \"Have you seen or consulted any other health care providers outside of the [address] [place] [address] since your last visit? \"  Yes, [place] providers      3. For patients aged 39-70: Has the patient had a colonoscopy / FIT/ Cologuard? NA - based on age      If the patient is female:    4. For patients aged 41-77: Has the patient had a mammogram within the past 2 years? NA - based on age or sex      11. For patients aged 21-65: Has the patient had a pap smear?  NA - based on age or sex

## 2022-11-04 NOTE — PROGRESS NOTES
Sebas Petersen is a 79 y.o.  male and presents with Diabetes, Hypertension, Cholesterol Problem (F/u), and Coronary Artery Disease      SUBJECTIVE:  Pt's HTN and Afib are well controlled on Toprol XL 50 mg. Patient had a non-ST elevation MI and underwent cardiac catheterization with placement of stent in April/May 2021. Patient was placed on dual antiplatelet therapy until April 2022. He is now just on aspirin 81 mg daily as well as Lipitor 20 mg daily for his dyslipidemia. His LDL is good at 43. Patient will follow up with cardiology for further management. Patient also status post pacemaker for sick sinus syndrome. Patient is followed by urology for history of prostate cancer. He had external beam radiation in the past.  Patient also was recently diagnosed with diabetes with an A1c of 6.5 at the [place]. He is trying to control this currently with diet by cutting back starches and sweets and his A1c is controlled at  6.3. [place] Therefore no medication is needed at this time. Respiratory ROS: negative for - shortness of breath  Cardiovascular ROS: negative for - chest pain    Current Outpatient Medications   Medication Sig    atorvastatin (LIPITOR) 20 mg tablet TAKE 1 TABLET NIGHTLY    metoprolol succinate (TOPROL-XL) 50 mg XL tablet TAKE 1 TABLET DAILY    aspirin 81 mg tablet Take 1 Tab by mouth daily. nitroglycerin (NITROLINGUAL) 400 mcg/spray spray 1 Spray by SubLINGual route every five (5) minutes as needed for Chest Pain (Up to 3 maximum doses). multivitamin, tx-iron-ca-min (THERA-M w/ IRON) 9 mg iron-400 mcg tab tablet Take 1 Tab by mouth daily. fish oil-omega-3 fatty acids 340-1,000 mg capsule Take 1,000 mg by mouth.    latanoprost (XALATAN) 0.005 % ophthalmic solution Administer 1 Drop to both eyes daily. brimonidine (ALPHAGAN) 0.2 % ophthalmic solution Administer 1 Drop to both eyes two (2) times a day.     ZINC MTH/COPPER/SAW PALM/GNSG (PROSTATE HEALTH FORMULA PO) Take 1 Tab by mouth daily.    bromfenac (BROMDAY) 0.09 % ophthalmic solution  (Patient not taking: Reported on 11/4/2022)     No current facility-administered medications for this visit. OBJECTIVE:  alert, well appearing, and in no distress  Visit Vitals  /77 (BP 1 Location: Left arm, BP Patient Position: Sitting, BP Cuff Size: Adult)   Pulse 63   Temp 98.4 °F (36.9 °C) (Temporal)   Resp 16   Ht 6' 3\" (1.905 m)   Wt 206 lb (93.4 kg)   SpO2 100%   BMI 25.75 kg/m²      well developed and well nourished  HEENT severe impacted cerumen in both ear canals. Chest - clear to auscultation, no wheezes, rales or rhonchi, symmetric air entry  Heart - normal rate, regular rhythm, normal S1, S2, no murmurs, rubs, clicks or gallops  Extremities - peripheral pulses normal, no pedal edema, no clubbing or cyanosis  Skin - normal coloration and turgor, no rashes, no suspicious skin lesions noted      Labs:   Lab Results   Component Value Date/Time    Cholesterol, total 100 10/28/2022 10:56 AM    HDL Cholesterol 47 10/28/2022 10:56 AM    LDL, calculated 43.6 10/28/2022 10:56 AM    Triglyceride 47 10/28/2022 10:56 AM    CHOL/HDL Ratio 2.1 10/28/2022 10:56 AM          Labs:   Lab Results   Component Value Date/Time    Sodium 141 10/28/2022 10:56 AM    Potassium 4.6 10/28/2022 10:56 AM    Chloride 107 10/28/2022 10:56 AM    CO2 27 10/28/2022 10:56 AM    Anion gap 7 10/28/2022 10:56 AM    Glucose 93 10/28/2022 10:56 AM    BUN 16 10/28/2022 10:56 AM    Creatinine 1.19 10/28/2022 10:56 AM    BUN/Creatinine ratio 13 10/28/2022 10:56 AM    GFR est AA >60 07/26/2022 08:30 AM    GFR est non-AA >60 07/26/2022 08:30 AM    Calcium 9.1 10/28/2022 10:56 AM    Bilirubin, total 1.0 10/28/2022 10:56 AM    ALT (SGPT) 27 10/28/2022 10:56 AM    Alk.  phosphatase 88 10/28/2022 10:56 AM    Protein, total 7.2 10/28/2022 10:56 AM    Albumin 4.2 10/28/2022 10:56 AM    Globulin 3.0 10/28/2022 10:56 AM    A-G Ratio 1.4 10/28/2022 10:56 AM       Labs:   Lab Results Component Value Date/Time    Prostate Specific Ag 0.15 06/09/2022 10:23 AM    Prostate Specific Ag 0.14 04/05/2021 10:11 AM    Prostate Specific Ag 0.13 10/05/2020 10:04 AM    Prostate Specific Ag 3.45 03/12/2014 04:01 PM    Prostate Specific Ag 2.760 01/11/2013 12:00 AM    Prostate Specific Ag 2.330 05/23/2012 09:37 AM         Assessment/Plan    ICD-10-CM ICD-9-CM    1. Type 2 diabetes mellitus without complication, without long-term current use of insulin (HCC)  E11.9 250.00 Well-controlled currently with diet MICROALBUMIN, UR, RAND W/ MICROALB/CREAT RATIO      HEMOGLOBIN A1C W/O EAG      2. Essential hypertension  I10 401.9 Well-controlled on Toprol-XL 50 mg daily METABOLIC PANEL, COMPREHENSIVE      3. Dyslipidemia  E78.5 272.4 Well-controlled Lipitor 20 mg daily LIPID PANEL      4. Coronary artery disease of native artery of native heart with stable angina pectoris ([facility])  I25.118 414.01 Stable on statin and aspirin 81 mg daily. Patient followed by cardiology     413.9                      Follow-up and Dispositions    Return in about 6 months (around 5/4/2023) for labs 1 week before. Reviewed plan of care. Patient has provided input and agrees with goals.

## 2023-01-23 ENCOUNTER — OFFICE VISIT (OUTPATIENT)
Dept: CARDIOLOGY CLINIC | Age: 83
End: 2023-01-23
Payer: MEDICARE

## 2023-01-23 VITALS
OXYGEN SATURATION: 97 % | WEIGHT: 215 LBS | DIASTOLIC BLOOD PRESSURE: 68 MMHG | BODY MASS INDEX: 26.73 KG/M2 | SYSTOLIC BLOOD PRESSURE: 130 MMHG | HEART RATE: 72 BPM | HEIGHT: 75 IN

## 2023-01-23 DIAGNOSIS — I10 ESSENTIAL HYPERTENSION: ICD-10-CM

## 2023-01-23 DIAGNOSIS — I25.10 CORONARY ARTERY DISEASE INVOLVING NATIVE CORONARY ARTERY OF NATIVE HEART WITHOUT ANGINA PECTORIS: Primary | ICD-10-CM

## 2023-01-23 DIAGNOSIS — E78.5 DYSLIPIDEMIA: ICD-10-CM

## 2023-01-23 DIAGNOSIS — I49.5 SICK SINUS SYNDROME (HCC): ICD-10-CM

## 2023-01-23 DIAGNOSIS — E11.9 TYPE 2 DIABETES MELLITUS WITHOUT COMPLICATION, UNSPECIFIED WHETHER LONG TERM INSULIN USE (HCC): ICD-10-CM

## 2023-01-23 DIAGNOSIS — Z95.0 CARDIAC PACEMAKER IN SITU: ICD-10-CM

## 2023-01-23 PROCEDURE — G8536 NO DOC ELDER MAL SCRN: HCPCS | Performed by: INTERNAL MEDICINE

## 2023-01-23 PROCEDURE — 3075F SYST BP GE 130 - 139MM HG: CPT | Performed by: INTERNAL MEDICINE

## 2023-01-23 PROCEDURE — 3078F DIAST BP <80 MM HG: CPT | Performed by: INTERNAL MEDICINE

## 2023-01-23 PROCEDURE — G8417 CALC BMI ABV UP PARAM F/U: HCPCS | Performed by: INTERNAL MEDICINE

## 2023-01-23 PROCEDURE — 1123F ACP DISCUSS/DSCN MKR DOCD: CPT | Performed by: INTERNAL MEDICINE

## 2023-01-23 PROCEDURE — G8510 SCR DEP NEG, NO PLAN REQD: HCPCS | Performed by: INTERNAL MEDICINE

## 2023-01-23 PROCEDURE — G8427 DOCREV CUR MEDS BY ELIG CLIN: HCPCS | Performed by: INTERNAL MEDICINE

## 2023-01-23 PROCEDURE — 99214 OFFICE O/P EST MOD 30 MIN: CPT | Performed by: INTERNAL MEDICINE

## 2023-01-23 PROCEDURE — 1101F PT FALLS ASSESS-DOCD LE1/YR: CPT | Performed by: INTERNAL MEDICINE

## 2023-01-23 PROCEDURE — 93288 INTERROG EVL PM/LDLS PM IP: CPT | Performed by: INTERNAL MEDICINE

## 2023-01-23 NOTE — PROGRESS NOTES
HISTORY OF PRESENT ILLNESS  Catie Brown is a 79 y.o. male. Follow-up  Pertinent negatives include no chest pain, no abdominal pain, no headaches and no shortness of breath. Leg Swelling  Pertinent negatives include no chest pain, no abdominal pain, no headaches and no shortness of breath. Patient presents for a follow-up office visit. He has a past medical history significant for paroxysmal atrial fibrillation, hypertension, and sick sinus syndrome, status post dual-chamber permanent pacemaker in March 2011. More recently, he was diagnosed with coronary disease in 2021. He underwent an exercise nuclear stress test in February 2019 which was a normal low risk study. He exercised 8 minutes on the treadmill using the Raghu protocol without any EKG changes or symptoms. No ischemia. EF 72%. The patient was hospitalized in April 2021 with a non-ST elevation myocardial infarction. He presented with acute onset chest pain was found to have an elevated troponin which peaked at 1.45. He subsequently underwent a cardiac catheterization which demonstrated severe two-vessel coronary artery disease. His culprit lesion was a 99% ostial LAD which underwent angioplasty and stenting with a single drug-eluting stent. He is also found to have 85% mid LAD stenosis which underwent stenting with a second drug-eluting stent. He had a residual distal left circumflex 70% lesion as well with plan for staged intervention in the future. An echocardiogram during his hospital stay demonstrated preserved LVEF of 55 to 60% with grade 1 diastolic dysfunction, no valvular heart disease and normal PA pressures. Patient subsequently underwent a staged PCI to his distal left circumflex with a another single drug-eluting stent in May 2021 using a Xience 3.0 x 12 mm stent. He stopped his Brilinta as instructed at the end of May 2022. He underwent a pacemaker generator exchange in October 2022.   Since the procedure, he has been feeling well. He denies any new chest pain, shortness of breath, heart palpitations, dizziness, nor syncope. He does complain of chronic left lower extremity swelling to his shins which has not changed in severity over the past year. Past Medical History:   Diagnosis Date    Atrial fibrillation (HCC)     CHADS 0  (-CHF, -HTN, -AGE, -DM, -CVA)    Cardiac echocardiogram 06/29/2010    EF 70-75%. Mild conc LVh. Gr 1 DDfx. Mild DELMY. Cardiac Holter monitor, normal 06/28/2010    Normal Holter study. Cardiac nuclear imaging test 11/22/2010    Sm area of apical ischemia and inferolateral scar, both possibly apical thinning. No WMA. EF 60%. Impotence of organic origin     Pacemaker     3/11  [employer]TRONIC    Personal history of malignant neoplasm of prostate     T1a, [place] 6 (3+3)     Prostate cancer (HCC)     PUD (peptic ulcer disease)     Sick sinus syndrome ([address]     status post implantation of Medtronic dual-chamber permanent pacemaker 3/22/11. Unspecified essential hypertension     Venereal disease       Current Outpatient Medications   Medication Sig Dispense Refill    atorvastatin (LIPITOR) 20 mg tablet TAKE 1 TABLET NIGHTLY 90 Tablet 3    metoprolol succinate (TOPROL-XL) 50 mg XL tablet TAKE 1 TABLET DAILY 90 Tablet 3    aspirin 81 mg tablet Take 1 Tab by mouth daily. 30 Tab 0    nitroglycerin (NITROLINGUAL) 400 mcg/spray spray 1 Spray by SubLINGual route every five (5) minutes as needed for Chest Pain (Up to 3 maximum doses). 1 Bottle 0    multivitamin, tx-iron-ca-min (THERA-M w/ IRON) 9 mg iron-400 mcg tab tablet Take 1 Tab by mouth daily. fish oil-omega-3 fatty acids 340-1,000 mg capsule Take 1,000 mg by mouth.      latanoprost (XALATAN) 0.005 % ophthalmic solution Administer 1 Drop to both eyes daily. brimonidine (ALPHAGAN) 0.2 % ophthalmic solution Administer 1 Drop to both eyes two (2) times a day.       ZINC MTH/COPPER/SAW PALM/GNSG (PROSTATE HEALTH FORMULA PO) Take 1 Tab by mouth daily. Allergies   Allergen Reactions    Shellfish Containing Products Swelling and Other (comments)    Iodine Swelling      Social History     Tobacco Use    Smoking status: Former     Packs/day: 0.25     Years: 1.00     Pack years: 0.25     Types: Cigarettes     Quit date: 1981     Years since quittin.8    Smokeless tobacco: Never   Vaping Use    Vaping Use: Never used   Substance Use Topics    Alcohol use: No    Drug use: No       History reviewed. No pertinent family history. Review of Systems   Constitutional:  Negative for chills, fever and weight loss. HENT:  Negative for nosebleeds. Eyes:  Negative for blurred vision and double vision. Respiratory:  Negative for cough, shortness of breath and wheezing. Cardiovascular:  Positive for leg swelling. Negative for chest pain, palpitations, orthopnea, claudication and PND. Gastrointestinal:  Negative for abdominal pain, heartburn, nausea and vomiting. Genitourinary:  Negative for dysuria and hematuria. Musculoskeletal:  Negative for falls and myalgias. Skin:  Negative for rash. Neurological:  Negative for dizziness, focal weakness and headaches. Endo/Heme/Allergies:  Does not bruise/bleed easily. Psychiatric/Behavioral:  Negative for substance abuse. Visit Vitals  /68 (BP 1 Location: Left upper arm, BP Patient Position: Sitting, BP Cuff Size: Small adult)   Pulse 72   Ht 6' 3\" (1.905 m)   Wt 97.5 kg (215 lb)   SpO2 97%   BMI 26.87 kg/m²      Physical Exam  Constitutional:       Appearance: He is well-developed. HENT:      Head: Normocephalic and atraumatic. Eyes:      Conjunctiva/sclera: Conjunctivae normal.   Neck:      Vascular: No carotid bruit or JVD. Cardiovascular:      Rate and Rhythm: Normal rate and regular rhythm. Pulses: Normal pulses. Heart sounds: S1 normal and S2 normal. No murmur heard. No gallop. No S3 sounds. Pulmonary:      Breath sounds: Normal breath sounds. No wheezing or rales. Abdominal:      General: Bowel sounds are normal.      Palpations: Abdomen is soft. Tenderness: There is no abdominal tenderness. Musculoskeletal:         General: No tenderness. Cervical back: Neck supple. Left lower leg: Edema ( Trace) present. Skin:     General: Skin is warm and dry. Neurological:      General: No focal deficit present. Mental Status: He is alert and oriented to person, place, and time. Psychiatric:         Mood and Affect: Mood normal.         Behavior: Behavior normal.     Pacemaker interrogation. New pacemaker generator beginning of life with estimated longevity of 13 years. No prolonged episodes of atrial fibrillation since last check. Pacing in the atrium 99 % in the ventricle less than 1%. Scanned document for details. ASSESSMENT and PLAN    History of Non-ST elevation myocardial infarction. This occurred in April 2021. He was found to have a high-grade sequential lesions of his LAD with an ostial 99% lesion and a mid 85% lesion, both of which underwent angioplasty and stenting with a total of 2 drug-eluting stents. No regional wall motion abnormalities on his echocardiogram.  His troponin level went up to 1.45. He has been chest pain-free since his PCI. Coronary artery disease. Patient underwent PCI to his ostial mid LAD with a total of 2 drug-eluting stents in the setting of an acute non-ST elevation myocardial infarction in April 2021. He then underwent a staged PCI to his 70% distal left circumflex stenosis with a single drug-eluting stent in May 2021. Patient is currently taking an aspirin, potent statin and beta-blocker. No new symptoms concerning for angina. I have recommended repeating a nuclear stress test in May 2023. Paroxysmal atrial fibrillation. Patient with his last episode occurring in January 2022 lasting approximately 15 hours. No high rate episodes. His previous episode was in April 2021.   If his episodes become more frequent, I would recommend starting oral anticoagulation with Eliquis. Sick sinus syndrome. Status post dual-chamber permanent pacemaker. Patient recently underwent a pacemaker generator exchange in October 2022. Normal device function on interrogation today. His battery is now at beginning of life with estimated longevity of 13 years. Essential hypertension. Patient blood pressure is now controlled on metoprolol as monotherapy. Dyslipidemia. Patient continues to take atorvastatin 20 mg daily. His most recent lipid profile from October 2022: Total cholesterol 100, HDL 47, LDL 44. These numbers remain at goal.    [facility] device check in 3 months. Follow-up in the office in 6 months, sooner if needed.

## 2023-01-23 NOTE — PROGRESS NOTES
Zulema Resendez presents today for   Chief Complaint   Patient presents with    Follow-up     3 month gen change follow up. Pt had lightheaded spell for 2-3 seconds last week only     Leg Swelling     Constant lt leg swelling at night. Zulema Resendez preferred language for health care discussion is english/other. Is someone accompanying this pt? no    Is the patient using any DME equipment during [address]? no    Depression Screening:  3 most recent PHQ Screens 1/23/2023   Little interest or pleasure in doing things Not at all   Feeling down, depressed, irritable, or hopeless Not at all   Total Score PHQ 2 0   Trouble falling or staying asleep, or sleeping too much -   Feeling tired or having little energy -   Poor appetite, weight loss, or overeating -   Feeling bad about yourself - or that you are a failure or have let yourself or your family down -   Trouble concentrating on things such as school, work, reading, or watching TV -   Moving or speaking so slowly that other people could have noticed; or the opposite being so fidgety that others notice -   Thoughts of being better off dead, or hurting yourself in some way -   PHQ 9 Score -   How difficult have these problems made it for you to do your work, take care of your home and get along with others -       Learning Assessment:  Learning Assessment 1/23/2023   PRIMARY LEARNER Patient   HIGHEST LEVEL OF EDUCATION - PRIMARY LEARNER  -   BARRIERS PRIMARY LEARNER -   CO-LEARNER CAREGIVER -   PRIMARY LANGUAGE ENGLISH   LEARNER PREFERENCE PRIMARY DEMONSTRATION     -   ANSWERED BY patient   RELATIONSHIP SELF       Abuse Screening:  Abuse Screening Questionnaire 1/23/2023   Do you ever feel afraid of your partner? N   Are you in a relationship with someone who physically or mentally threatens you? N   Is it safe for you to go home? Y       Fall Risk  Fall Risk Assessment, last 12 mths 1/23/2023   Able to walk? Yes   Fall in past 12 months? 0   Do you feel unsteady?  0 Are you worried about falling 0   Is TUG test greater than 12 seconds? -   Is the gait abnormal? -   Number of falls in past 12 months -   Fall with injury? -           Pt currently taking Anticoagulant therapy? no    Pt currently taking Antiplatelet therapy ? ASA 81 mg 1x daily       Coordination of Care:  1. Have you been to the ER, urgent care clinic since your last visit? Hospitalized since your last visit? no    2. Have you seen or consulted any other health care providers outside of the [address] [place] [address] since your last visit? Include any pap smears or colon screening.  no

## 2023-02-04 DIAGNOSIS — E11.9 TYPE 2 DIABETES MELLITUS WITHOUT COMPLICATION, WITHOUT LONG-TERM CURRENT USE OF INSULIN (HCC): Primary | ICD-10-CM

## 2023-02-04 DIAGNOSIS — E78.5 DYSLIPIDEMIA: Primary | ICD-10-CM

## 2023-02-05 DIAGNOSIS — I10 ESSENTIAL HYPERTENSION: Primary | ICD-10-CM

## 2023-02-05 DIAGNOSIS — E11.9 TYPE 2 DIABETES MELLITUS WITHOUT COMPLICATION, WITHOUT LONG-TERM CURRENT USE OF INSULIN (HCC): Primary | ICD-10-CM

## 2023-04-04 RX ORDER — METOPROLOL SUCCINATE 50 MG/1
TABLET, EXTENDED RELEASE ORAL
Qty: 90 TABLET | Refills: 3 | Status: SHIPPED | OUTPATIENT
Start: 2023-04-04

## 2023-04-26 ENCOUNTER — OFFICE VISIT (OUTPATIENT)
Age: 83
End: 2023-04-26

## 2023-04-26 DIAGNOSIS — Z95.0 PRESENCE OF CARDIAC PACEMAKER: Primary | ICD-10-CM

## 2023-04-26 DIAGNOSIS — I49.5 SICK SINUS SYNDROME (HCC): ICD-10-CM

## 2023-05-01 DIAGNOSIS — I25.10 CAD (CORONARY ARTERY DISEASE): Primary | ICD-10-CM

## 2023-05-05 ENCOUNTER — HOSPITAL ENCOUNTER (OUTPATIENT)
Facility: HOSPITAL | Age: 83
Setting detail: SPECIMEN
End: 2023-05-05
Payer: MEDICARE

## 2023-05-05 DIAGNOSIS — E78.5 DYSLIPIDEMIA: ICD-10-CM

## 2023-05-05 DIAGNOSIS — E11.9 TYPE 2 DIABETES MELLITUS WITHOUT COMPLICATION, WITHOUT LONG-TERM CURRENT USE OF INSULIN (HCC): ICD-10-CM

## 2023-05-05 DIAGNOSIS — I10 ESSENTIAL HYPERTENSION: ICD-10-CM

## 2023-05-05 LAB
ALBUMIN SERPL-MCNC: 3.9 G/DL (ref 3.4–5)
ALBUMIN/GLOB SERPL: 1.3 (ref 0.8–1.7)
ALP SERPL-CCNC: 78 U/L (ref 45–117)
ALT SERPL-CCNC: 27 U/L (ref 16–61)
ANION GAP SERPL CALC-SCNC: 2 MMOL/L (ref 3–18)
AST SERPL-CCNC: 25 U/L (ref 10–38)
BILIRUB SERPL-MCNC: 1 MG/DL (ref 0.2–1)
BUN SERPL-MCNC: 17 MG/DL (ref 7–18)
BUN/CREAT SERPL: 14 (ref 12–20)
CALCIUM SERPL-MCNC: 8.9 MG/DL (ref 8.5–10.1)
CHLORIDE SERPL-SCNC: 110 MMOL/L (ref 100–111)
CHOLEST SERPL-MCNC: 101 MG/DL
CO2 SERPL-SCNC: 28 MMOL/L (ref 21–32)
CREAT SERPL-MCNC: 1.18 MG/DL (ref 0.6–1.3)
EST. AVERAGE GLUCOSE BLD GHB EST-MCNC: 137 MG/DL
GLOBULIN SER CALC-MCNC: 3 G/DL (ref 2–4)
GLUCOSE SERPL-MCNC: 87 MG/DL (ref 74–99)
HBA1C MFR BLD: 6.4 % (ref 4.2–5.6)
HDLC SERPL-MCNC: 46 MG/DL (ref 40–60)
HDLC SERPL: 2.2 (ref 0–5)
LDLC SERPL CALC-MCNC: 45.8 MG/DL (ref 0–100)
LIPID PANEL: NORMAL
POTASSIUM SERPL-SCNC: 4.4 MMOL/L (ref 3.5–5.5)
PROT SERPL-MCNC: 6.9 G/DL (ref 6.4–8.2)
SODIUM SERPL-SCNC: 140 MMOL/L (ref 136–145)
TRIGL SERPL-MCNC: 46 MG/DL
VLDLC SERPL CALC-MCNC: 9.2 MG/DL

## 2023-05-05 PROCEDURE — 36415 COLL VENOUS BLD VENIPUNCTURE: CPT

## 2023-05-05 PROCEDURE — 80053 COMPREHEN METABOLIC PANEL: CPT

## 2023-05-05 PROCEDURE — 80061 LIPID PANEL: CPT

## 2023-05-05 PROCEDURE — 83036 HEMOGLOBIN GLYCOSYLATED A1C: CPT

## 2023-05-08 ENCOUNTER — HOSPITAL ENCOUNTER (OUTPATIENT)
Facility: HOSPITAL | Age: 83
Setting detail: SPECIMEN
Discharge: HOME | End: 2023-05-11
Payer: MEDICARE

## 2023-05-08 DIAGNOSIS — E11.9 TYPE 2 DIABETES MELLITUS WITHOUT COMPLICATION, WITHOUT LONG-TERM CURRENT USE OF INSULIN (HCC): ICD-10-CM

## 2023-05-08 LAB
CREAT UR-MCNC: 223 MG/DL (ref 30–125)
MICROALBUMIN UR-MCNC: 3.91 MG/DL (ref 0–3)
MICROALBUMIN/CREAT UR-RTO: 18 MG/G (ref 0–30)

## 2023-05-08 PROCEDURE — 82570 ASSAY OF URINE CREATININE: CPT

## 2023-05-08 PROCEDURE — 82043 UR ALBUMIN QUANTITATIVE: CPT

## 2023-05-08 NOTE — PROGRESS NOTES
Carelink:  dual Pacemaker. 12 years left on battery. Lead impedance and threshold WNL. A paced 99 %, V sensed 99 %. 0 episodes since last check in December.

## 2023-05-12 ENCOUNTER — OFFICE VISIT (OUTPATIENT)
Age: 83
End: 2023-05-12
Payer: MEDICARE

## 2023-05-12 VITALS
TEMPERATURE: 98.1 F | WEIGHT: 212 LBS | HEART RATE: 63 BPM | HEIGHT: 75 IN | RESPIRATION RATE: 18 BRPM | DIASTOLIC BLOOD PRESSURE: 69 MMHG | SYSTOLIC BLOOD PRESSURE: 119 MMHG | BODY MASS INDEX: 26.36 KG/M2 | OXYGEN SATURATION: 98 %

## 2023-05-12 DIAGNOSIS — I10 ESSENTIAL (PRIMARY) HYPERTENSION: ICD-10-CM

## 2023-05-12 DIAGNOSIS — C61 MALIGNANT NEOPLASM OF PROSTATE (HCC): ICD-10-CM

## 2023-05-12 DIAGNOSIS — E11.9 TYPE 2 DIABETES MELLITUS WITHOUT COMPLICATION, WITHOUT LONG-TERM CURRENT USE OF INSULIN (HCC): Primary | ICD-10-CM

## 2023-05-12 DIAGNOSIS — E78.2 MIXED HYPERLIPIDEMIA: ICD-10-CM

## 2023-05-12 DIAGNOSIS — I25.118 ATHEROSCLEROTIC HEART DISEASE OF NATIVE CORONARY ARTERY WITH OTHER FORMS OF ANGINA PECTORIS (HCC): ICD-10-CM

## 2023-05-12 PROCEDURE — 99214 OFFICE O/P EST MOD 30 MIN: CPT | Performed by: INTERNAL MEDICINE

## 2023-05-12 PROCEDURE — 3074F SYST BP LT 130 MM HG: CPT | Performed by: INTERNAL MEDICINE

## 2023-05-12 PROCEDURE — 3044F HG A1C LEVEL LT 7.0%: CPT | Performed by: INTERNAL MEDICINE

## 2023-05-12 PROCEDURE — G8419 CALC BMI OUT NRM PARAM NOF/U: HCPCS | Performed by: INTERNAL MEDICINE

## 2023-05-12 PROCEDURE — 1123F ACP DISCUSS/DSCN MKR DOCD: CPT | Performed by: INTERNAL MEDICINE

## 2023-05-12 PROCEDURE — G8427 DOCREV CUR MEDS BY ELIG CLIN: HCPCS | Performed by: INTERNAL MEDICINE

## 2023-05-12 PROCEDURE — 99211 OFF/OP EST MAY X REQ PHY/QHP: CPT | Performed by: INTERNAL MEDICINE

## 2023-05-12 PROCEDURE — 3078F DIAST BP <80 MM HG: CPT | Performed by: INTERNAL MEDICINE

## 2023-05-12 PROCEDURE — 1036F TOBACCO NON-USER: CPT | Performed by: INTERNAL MEDICINE

## 2023-05-12 SDOH — ECONOMIC STABILITY: HOUSING INSECURITY
IN THE LAST 12 MONTHS, WAS THERE A TIME WHEN YOU DID NOT HAVE A STEADY PLACE TO SLEEP OR SLEPT IN A SHELTER (INCLUDING NOW)?: NO

## 2023-05-12 SDOH — ECONOMIC STABILITY: FOOD INSECURITY: WITHIN THE PAST 12 MONTHS, YOU WORRIED THAT YOUR FOOD WOULD RUN OUT BEFORE YOU GOT MONEY TO BUY MORE.: NEVER TRUE

## 2023-05-12 SDOH — ECONOMIC STABILITY: INCOME INSECURITY: HOW HARD IS IT FOR YOU TO PAY FOR THE VERY BASICS LIKE FOOD, HOUSING, MEDICAL CARE, AND HEATING?: NOT HARD AT ALL

## 2023-05-12 SDOH — ECONOMIC STABILITY: FOOD INSECURITY: WITHIN THE PAST 12 MONTHS, THE FOOD YOU BOUGHT JUST DIDN'T LAST AND YOU DIDN'T HAVE MONEY TO GET MORE.: NEVER TRUE

## 2023-05-12 ASSESSMENT — PATIENT HEALTH QUESTIONNAIRE - PHQ9
SUM OF ALL RESPONSES TO PHQ QUESTIONS 1-9: 0
SUM OF ALL RESPONSES TO PHQ9 QUESTIONS 1 & 2: 0
SUM OF ALL RESPONSES TO PHQ QUESTIONS 1-9: 0
1. LITTLE INTEREST OR PLEASURE IN DOING THINGS: 0
SUM OF ALL RESPONSES TO PHQ QUESTIONS 1-9: 0
SUM OF ALL RESPONSES TO PHQ QUESTIONS 1-9: 0
2. FEELING DOWN, DEPRESSED OR HOPELESS: 0

## 2023-05-18 RX ORDER — ATORVASTATIN CALCIUM 20 MG/1
TABLET, FILM COATED ORAL
Qty: 90 TABLET | Refills: 3 | Status: SHIPPED | OUTPATIENT
Start: 2023-05-18

## 2023-05-23 ENCOUNTER — HOSPITAL ENCOUNTER (OUTPATIENT)
Facility: HOSPITAL | Age: 83
Discharge: HOME | End: 2023-05-25
Payer: MEDICARE

## 2023-05-23 DIAGNOSIS — E11.52 DIABETIC GANGRENE (HCC): ICD-10-CM

## 2023-05-23 LAB
VAS LEFT ABI: 1.29
VAS LEFT ARM BP: 135 MMHG
VAS LEFT CALF PRESSURE: 166 MMHG
VAS LEFT DORSALIS PEDIS BP: 153 MMHG
VAS LEFT PTA BP: 175 MMHG
VAS LEFT TBI: 0.89
VAS LEFT TOE PRESSURE: 121 MMHG
VAS RIGHT ABI: 1.27
VAS RIGHT ARM BP: 136 MMHG
VAS RIGHT CALF PRESSURE: 168 MMHG
VAS RIGHT DORSALIS PEDIS BP: 155 MMHG
VAS RIGHT PTA BP: 173 MMHG
VAS RIGHT TBI: 0.81
VAS RIGHT TOE PRESSURE: 110 MMHG

## 2023-05-23 PROCEDURE — 93923 UPR/LXTR ART STDY 3+ LVLS: CPT | Performed by: SURGERY

## 2023-05-23 PROCEDURE — 93923 UPR/LXTR ART STDY 3+ LVLS: CPT

## 2023-05-25 NOTE — PROGRESS NOTES
Patient: Samanta Savage                MRN: [id number]       SSN: [id number]  YOB: 1940        AGE: 79 y.o. SEX: male      PCP: Eloisa Yen MD  05/26/23    Chief Complaint   Patient presents with    Knee Pain     left     HISTORY:  Samanta Savage is a 79 y.o. male who is seen for left knee pain and swelling. His pain reemerged this past Tuesday after working out. He goes to the gym regularly--uses the treadmill and light cycle to build up his knee strength. He describes his pain as a 4/10. He has an upcoming trip to [facility] to officiate his son's wedding next week and would like to feel better before his trip. He reports pain mainly when walking. 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 He felt a snapping sensation when he supported the water pack on his left knee as he opened the screen door. He experiences startup pain after sitting. He has a hard time getting up. Cortisone injections have helped in the past. He denies a h/o of gout. He completed a Euflexxa series on 4/2/21. Occupation, etc: Mr. Rachel Gonzalez is retired. He is a  at "[employer]. He retired from [employer] after 25 years. He also worked as a  for the [employer]. He lives alone in [place]. His wife passed away in 2020. He has a  son in [place] and a son in [place]. Both of his sons are retired . He has 2 granddaughters and 1 grandson. He is active in his [employer] community and volunteers for friends and neighbors. He used to play [employer]. He recently started using the elliptical  and walking on the treadmill at the gym. Mr. Rachel Gonzalez  weighs 211 lbs and is 6'2\" tall. He is not diabetic. He is no longer on blood thinners.    Wt Readings from Last 3 Encounters:   05/26/23 211 lb (95.7 kg)   05/12/23 212 lb (96.2 kg)   05/02/23 215 lb (97.5 kg)      Body mass index is 26.37

## 2023-05-26 ENCOUNTER — OFFICE VISIT (OUTPATIENT)
Age: 83
End: 2023-05-26

## 2023-05-26 VITALS — HEIGHT: 75 IN | WEIGHT: 211 LBS | BODY MASS INDEX: 26.24 KG/M2 | TEMPERATURE: 97.8 F

## 2023-05-26 DIAGNOSIS — M25.462 EFFUSION, LEFT KNEE: ICD-10-CM

## 2023-05-26 DIAGNOSIS — M17.12 UNILATERAL PRIMARY OSTEOARTHRITIS, LEFT KNEE: Primary | ICD-10-CM

## 2023-05-26 RX ORDER — BETAMETHASONE SODIUM PHOSPHATE AND BETAMETHASONE ACETATE 3; 3 MG/ML; MG/ML
3 INJECTION, SUSPENSION INTRA-ARTICULAR; INTRALESIONAL; INTRAMUSCULAR; SOFT TISSUE ONCE
Status: COMPLETED | OUTPATIENT
Start: 2023-05-26 | End: 2023-05-26

## 2023-05-26 RX ADMIN — BETAMETHASONE SODIUM PHOSPHATE AND BETAMETHASONE ACETATE 3 MG: 3; 3 INJECTION, SUSPENSION INTRA-ARTICULAR; INTRALESIONAL; INTRAMUSCULAR; SOFT TISSUE at 08:53

## 2023-06-26 SDOH — HEALTH STABILITY: PHYSICAL HEALTH: ON AVERAGE, HOW MANY DAYS PER WEEK DO YOU ENGAGE IN MODERATE TO STRENUOUS EXERCISE (LIKE A BRISK WALK)?: 2 DAYS

## 2023-06-26 SDOH — HEALTH STABILITY: PHYSICAL HEALTH: ON AVERAGE, HOW MANY MINUTES DO YOU ENGAGE IN EXERCISE AT THIS LEVEL?: 30 MIN

## 2023-06-27 ENCOUNTER — OFFICE VISIT (OUTPATIENT)
Age: 83
End: 2023-06-27
Payer: MEDICARE

## 2023-06-27 VITALS — WEIGHT: 211.4 LBS | BODY MASS INDEX: 26.28 KG/M2 | HEIGHT: 75 IN

## 2023-06-27 DIAGNOSIS — M17.12 UNILATERAL PRIMARY OSTEOARTHRITIS, LEFT KNEE: Primary | ICD-10-CM

## 2023-06-27 PROCEDURE — 99214 OFFICE O/P EST MOD 30 MIN: CPT | Performed by: ORTHOPAEDIC SURGERY

## 2023-06-27 PROCEDURE — 73560 X-RAY EXAM OF KNEE 1 OR 2: CPT | Performed by: ORTHOPAEDIC SURGERY

## 2023-06-27 PROCEDURE — G8427 DOCREV CUR MEDS BY ELIG CLIN: HCPCS | Performed by: ORTHOPAEDIC SURGERY

## 2023-06-27 PROCEDURE — 1123F ACP DISCUSS/DSCN MKR DOCD: CPT | Performed by: ORTHOPAEDIC SURGERY

## 2023-06-27 PROCEDURE — G8419 CALC BMI OUT NRM PARAM NOF/U: HCPCS | Performed by: ORTHOPAEDIC SURGERY

## 2023-06-27 PROCEDURE — 1036F TOBACCO NON-USER: CPT | Performed by: ORTHOPAEDIC SURGERY

## 2023-07-20 ENCOUNTER — TELEPHONE (OUTPATIENT)
Age: 83
End: 2023-07-20

## 2023-08-08 ENCOUNTER — OFFICE VISIT (OUTPATIENT)
Age: 83
End: 2023-08-08
Payer: MEDICARE

## 2023-08-08 VITALS
OXYGEN SATURATION: 96 % | SYSTOLIC BLOOD PRESSURE: 130 MMHG | WEIGHT: 211 LBS | DIASTOLIC BLOOD PRESSURE: 68 MMHG | BODY MASS INDEX: 27.08 KG/M2 | HEIGHT: 74 IN | HEART RATE: 79 BPM

## 2023-08-08 DIAGNOSIS — E11.9 TYPE 2 DIABETES MELLITUS WITHOUT COMPLICATION, UNSPECIFIED WHETHER LONG TERM INSULIN USE (HCC): ICD-10-CM

## 2023-08-08 DIAGNOSIS — I25.10 ATHEROSCLEROSIS OF NATIVE CORONARY ARTERY OF NATIVE HEART WITHOUT ANGINA PECTORIS: Primary | ICD-10-CM

## 2023-08-08 DIAGNOSIS — Z95.0 PRESENCE OF CARDIAC PACEMAKER: ICD-10-CM

## 2023-08-08 DIAGNOSIS — I10 ESSENTIAL (PRIMARY) HYPERTENSION: ICD-10-CM

## 2023-08-08 DIAGNOSIS — I49.5 SICK SINUS SYNDROME (HCC): ICD-10-CM

## 2023-08-08 PROBLEM — Z95.5 STENTED CORONARY ARTERY: Status: ACTIVE | Noted: 2023-08-08

## 2023-08-08 PROBLEM — E78.5 HYPERLIPIDEMIA, UNSPECIFIED: Status: ACTIVE | Noted: 2023-08-08

## 2023-08-08 PROBLEM — H40.9 GLAUCOMA: Status: ACTIVE | Noted: 2023-08-08

## 2023-08-08 PROBLEM — E78.5 HYPERLIPIDEMIA: Status: ACTIVE | Noted: 2023-08-08

## 2023-08-08 PROBLEM — Z95.5 HISTORY OF CORONARY ARTERY STENT PLACEMENT: Status: ACTIVE | Noted: 2023-08-08

## 2023-08-08 PROCEDURE — G8427 DOCREV CUR MEDS BY ELIG CLIN: HCPCS | Performed by: INTERNAL MEDICINE

## 2023-08-08 PROCEDURE — 3078F DIAST BP <80 MM HG: CPT | Performed by: INTERNAL MEDICINE

## 2023-08-08 PROCEDURE — 1036F TOBACCO NON-USER: CPT | Performed by: INTERNAL MEDICINE

## 2023-08-08 PROCEDURE — 3075F SYST BP GE 130 - 139MM HG: CPT | Performed by: INTERNAL MEDICINE

## 2023-08-08 PROCEDURE — 3044F HG A1C LEVEL LT 7.0%: CPT | Performed by: INTERNAL MEDICINE

## 2023-08-08 PROCEDURE — 1123F ACP DISCUSS/DSCN MKR DOCD: CPT | Performed by: INTERNAL MEDICINE

## 2023-08-08 PROCEDURE — 93000 ELECTROCARDIOGRAM COMPLETE: CPT | Performed by: INTERNAL MEDICINE

## 2023-08-08 PROCEDURE — 99214 OFFICE O/P EST MOD 30 MIN: CPT | Performed by: INTERNAL MEDICINE

## 2023-08-08 PROCEDURE — G8419 CALC BMI OUT NRM PARAM NOF/U: HCPCS | Performed by: INTERNAL MEDICINE

## 2023-08-08 RX ORDER — ASPIRIN 81 MG/1
81 TABLET ORAL DAILY
COMMUNITY

## 2023-08-08 ASSESSMENT — PATIENT HEALTH QUESTIONNAIRE - PHQ9
SUM OF ALL RESPONSES TO PHQ QUESTIONS 1-9: 0
SUM OF ALL RESPONSES TO PHQ QUESTIONS 1-9: 0
2. FEELING DOWN, DEPRESSED OR HOPELESS: 0
1. LITTLE INTEREST OR PLEASURE IN DOING THINGS: 0
SUM OF ALL RESPONSES TO PHQ9 QUESTIONS 1 & 2: 0
SUM OF ALL RESPONSES TO PHQ QUESTIONS 1-9: 0
SUM OF ALL RESPONSES TO PHQ QUESTIONS 1-9: 0

## 2023-08-08 ASSESSMENT — ENCOUNTER SYMPTOMS
SHORTNESS OF BREATH: 0
COUGH: 0
ABDOMINAL PAIN: 0
SORE THROAT: 0
VOMITING: 0
ABDOMINAL DISTENTION: 0
NAUSEA: 0

## 2023-08-08 NOTE — PROGRESS NOTES
08/08/23     Selin Muse  is a 79 y.o. male     Chief Complaint   Patient presents with    Follow-up     6 month       HPI    Patient presents for a follow-up office visit. He has a past medical history significant for paroxysmal atrial fibrillation, hypertension, and sick sinus syndrome, status post dual-chamber permanent pacemaker in March 2011. More recently, he was diagnosed with coronary disease in 2021. He underwent an exercise nuclear stress test in February 2019 which was a normal low risk study. He exercised 8 minutes on the treadmill using the Stephen protocol without any EKG changes or symptoms. No ischemia. EF 72%. The patient was hospitalized in April 2021 with a non-ST elevation myocardial infarction. He presented with acute onset chest pain was found to have an elevated troponin which peaked at 1.45. He subsequently underwent a cardiac catheterization which demonstrated severe two-vessel coronary artery disease. His culprit lesion was a 99% ostial LAD which underwent angioplasty and stenting with a single drug-eluting stent. He is also found to have 85% mid LAD stenosis which underwent stenting with a second drug-eluting stent. He had a residual distal left circumflex 70% lesion as well with plan for staged intervention in the future. An echocardiogram during his hospital stay demonstrated preserved LVEF of 55 to 60% with grade 1 diastolic dysfunction, no valvular heart disease and normal PA pressures. Patient subsequently underwent a staged PCI to his distal left circumflex with a another single drug-eluting stent in May 2021 using a Xience 3.0 x 12 mm stent. He stopped his Brilinta as instructed at the end of May 2022. He underwent a pacemaker generator exchange in October 2022. More recently, he completed a pharmacologic nuclear stress test in May 2023 which is a low risk test.  No evidence of ischemia. EF 72%. He was last seen in our office 6 to 7 months ago.   He

## 2023-08-08 NOTE — PROGRESS NOTES
Dara Mane presents today for   Chief Complaint   Patient presents with    Follow-up     6 month       Dara Mane preferred language for health care discussion is english/other. Is someone accompanying this pt? no    Is the patient using any DME equipment during OV? no    Depression Screening:  Depression: Not at risk    PHQ-2 Score: 0        Learning Assessment:  Who is the primary learner? Patient    What is the preferred language for health care of the primary learner? ENGLISH    How does the primary learner prefer to learn new concepts? DEMONSTRATION    Answered By patient    Relationship to Learner SELF           Pt currently taking Anticoagulant therapy? no    Pt currently taking Antiplatelet therapy ? no      Coordination of Care:  1. Have you been to the ER, urgent care clinic since your last visit? Hospitalized since your last visit? no    2. Have you seen or consulted any other health care providers outside of the [address] [place] [address] since your last visit? Include any pap smears or colon screening.  no

## 2023-09-07 ENCOUNTER — OFFICE VISIT (OUTPATIENT)
Age: 83
End: 2023-09-07

## 2023-09-07 DIAGNOSIS — M17.12 UNILATERAL PRIMARY OSTEOARTHRITIS, LEFT KNEE: Primary | ICD-10-CM

## 2023-09-07 RX ORDER — HYALURONATE SODIUM 10 MG/ML
20 SYRINGE (ML) INTRAARTICULAR ONCE
Status: COMPLETED | OUTPATIENT
Start: 2023-09-07 | End: 2023-09-07

## 2023-09-07 RX ADMIN — Medication 20 MG: at 14:20

## 2023-09-07 NOTE — PROGRESS NOTES
under the tongue every 5 minutes as needed for Chest pain     Current Facility-Administered Medications   Medication Dose Route Frequency    sodium hyaluronate (EUFLEXXA, HYALGAN) injection 20 mg  20 mg Intra-artICUlar Once        PHYSICAL EXAMINATION:  There were no vitals taken for this visit. ORTHO EXAMINATION:  Examination  Right knee  Left knee         Skin  Intact  Intact         Range of motion  120-0  115-0     Effusion  -  ++     Medial joint line tenderness  -  +     Lateral joint line tenderness  -  -     Popliteal tenderness  -  -     Osteophytes palpable  -  +     Sarah's  -  -     Patella crepitus  -  +     Anterior drawer  -  -     Lateral laxity  -  -     Medial laxity  -  -     Varus deformity  -  -     Valgus deformity  -  -     Pretibial edema  -  -         Calf tenderness  -  -   He ambulates with a single point cane    RADIOGRAPHS:    XR LEFT KNEE 2/5/21 SHIRA  IMPRESSION:  Three views with bilateral knees on AP view - No fractures, no effusion, moderate lateral joint space narrowing, no osteophytes present. Kellgren Ruben grade 2    PROCEDURE:   Patient's left knee injected with 2 cc Euflexxa. Chart reviewed for the following:   Demetrius Manley MD, have reviewed the History, Physical and updated the Allergic reactions for [address] performed immediately prior to start of procedure:  Demetrius Manley MD, have performed the following reviews on [facility] prior to the start of the procedure:            * Patient was identified by name and date of birth   * Agreement on procedure being performed was verified  * Risks and Benefits explained to the patient  * Procedure site verified and marked as necessary  * Patient was positioned for comfort  * Consent was obtained  Time: 2:16 PM  Date of procedure: 9/7/2023    Procedure performed by:  Dr. Cyn Jauregui    Mr. Gallegos tolerated the procedure well with no complications. IMPRESSION:      ICD-10-CM    1.

## 2023-09-14 ENCOUNTER — OFFICE VISIT (OUTPATIENT)
Age: 83
End: 2023-09-14

## 2023-09-14 VITALS — BODY MASS INDEX: 27.08 KG/M2 | HEIGHT: 74 IN | TEMPERATURE: 97.3 F | WEIGHT: 211 LBS

## 2023-09-14 DIAGNOSIS — M17.12 UNILATERAL PRIMARY OSTEOARTHRITIS, LEFT KNEE: Primary | ICD-10-CM

## 2023-09-14 RX ORDER — HYALURONATE SODIUM 10 MG/ML
20 SYRINGE (ML) INTRAARTICULAR ONCE
Status: COMPLETED | OUTPATIENT
Start: 2023-09-14 | End: 2023-09-14

## 2023-09-14 RX ADMIN — Medication 20 MG: at 09:59

## 2023-09-21 ENCOUNTER — OFFICE VISIT (OUTPATIENT)
Age: 83
End: 2023-09-21

## 2023-09-21 VITALS — BODY MASS INDEX: 27.08 KG/M2 | HEIGHT: 74 IN | WEIGHT: 211 LBS

## 2023-09-21 DIAGNOSIS — M17.12 UNILATERAL PRIMARY OSTEOARTHRITIS, LEFT KNEE: Primary | ICD-10-CM

## 2023-09-21 RX ORDER — HYALURONATE SODIUM 10 MG/ML
20 SYRINGE (ML) INTRAARTICULAR ONCE
Status: COMPLETED | OUTPATIENT
Start: 2023-09-21 | End: 2023-09-21

## 2023-09-21 RX ADMIN — Medication 20 MG: at 09:18

## 2023-11-14 ENCOUNTER — HOSPITAL ENCOUNTER (OUTPATIENT)
Facility: HOSPITAL | Age: 83
Setting detail: SPECIMEN
Discharge: HOME | End: 2023-11-17
Payer: MEDICARE

## 2023-11-14 DIAGNOSIS — E11.9 TYPE 2 DIABETES MELLITUS WITHOUT COMPLICATION, WITHOUT LONG-TERM CURRENT USE OF INSULIN (HCC): ICD-10-CM

## 2023-11-14 DIAGNOSIS — E78.2 MIXED HYPERLIPIDEMIA: ICD-10-CM

## 2023-11-14 DIAGNOSIS — I10 ESSENTIAL (PRIMARY) HYPERTENSION: ICD-10-CM

## 2023-11-14 LAB
ALBUMIN SERPL-MCNC: 4.1 G/DL (ref 3.4–5)
ALBUMIN/GLOB SERPL: 1.4 (ref 0.8–1.7)
ALP SERPL-CCNC: 88 U/L (ref 45–117)
ALT SERPL-CCNC: 28 U/L (ref 16–61)
ANION GAP SERPL CALC-SCNC: 3 MMOL/L (ref 3–18)
AST SERPL-CCNC: 25 U/L (ref 10–38)
BILIRUB SERPL-MCNC: 1 MG/DL (ref 0.2–1)
BUN SERPL-MCNC: 15 MG/DL (ref 7–18)
BUN/CREAT SERPL: 12 (ref 12–20)
CALCIUM SERPL-MCNC: 9.5 MG/DL (ref 8.5–10.1)
CHLORIDE SERPL-SCNC: 107 MMOL/L (ref 100–111)
CHOLEST SERPL-MCNC: 114 MG/DL
CO2 SERPL-SCNC: 30 MMOL/L (ref 21–32)
CREAT SERPL-MCNC: 1.25 MG/DL (ref 0.6–1.3)
EST. AVERAGE GLUCOSE BLD GHB EST-MCNC: 140 MG/DL
GLOBULIN SER CALC-MCNC: 3 G/DL (ref 2–4)
GLUCOSE SERPL-MCNC: 101 MG/DL (ref 74–99)
HBA1C MFR BLD: 6.5 % (ref 4.2–5.6)
HDLC SERPL-MCNC: 47 MG/DL (ref 40–60)
HDLC SERPL: 2.4 (ref 0–5)
LDLC SERPL CALC-MCNC: 55 MG/DL (ref 0–100)
LIPID PANEL: NORMAL
POTASSIUM SERPL-SCNC: 4.6 MMOL/L (ref 3.5–5.5)
PROT SERPL-MCNC: 7.1 G/DL (ref 6.4–8.2)
SODIUM SERPL-SCNC: 140 MMOL/L (ref 136–145)
TRIGL SERPL-MCNC: 60 MG/DL
VLDLC SERPL CALC-MCNC: 12 MG/DL

## 2023-11-14 PROCEDURE — 80053 COMPREHEN METABOLIC PANEL: CPT

## 2023-11-14 PROCEDURE — 36415 COLL VENOUS BLD VENIPUNCTURE: CPT

## 2023-11-14 PROCEDURE — 83036 HEMOGLOBIN GLYCOSYLATED A1C: CPT

## 2023-11-14 PROCEDURE — 80061 LIPID PANEL: CPT

## 2023-11-15 ENCOUNTER — OFFICE VISIT (OUTPATIENT)
Age: 83
End: 2023-11-15
Payer: MEDICARE

## 2023-11-15 DIAGNOSIS — I49.5 SICK SINUS SYNDROME (HCC): Primary | ICD-10-CM

## 2023-11-15 DIAGNOSIS — Z95.0 PRESENCE OF CARDIAC PACEMAKER: ICD-10-CM

## 2023-11-19 SDOH — HEALTH STABILITY: PHYSICAL HEALTH: ON AVERAGE, HOW MANY MINUTES DO YOU ENGAGE IN EXERCISE AT THIS LEVEL?: 40 MIN

## 2023-11-19 SDOH — HEALTH STABILITY: PHYSICAL HEALTH: ON AVERAGE, HOW MANY DAYS PER WEEK DO YOU ENGAGE IN MODERATE TO STRENUOUS EXERCISE (LIKE A BRISK WALK)?: 2 DAYS

## 2023-11-19 ASSESSMENT — PATIENT HEALTH QUESTIONNAIRE - PHQ9
SUM OF ALL RESPONSES TO PHQ QUESTIONS 1-9: 0
SUM OF ALL RESPONSES TO PHQ9 QUESTIONS 1 & 2: 0
SUM OF ALL RESPONSES TO PHQ QUESTIONS 1-9: 0
2. FEELING DOWN, DEPRESSED OR HOPELESS: 0
SUM OF ALL RESPONSES TO PHQ QUESTIONS 1-9: 0
1. LITTLE INTEREST OR PLEASURE IN DOING THINGS: 0
SUM OF ALL RESPONSES TO PHQ QUESTIONS 1-9: 0

## 2023-11-19 ASSESSMENT — LIFESTYLE VARIABLES
HOW OFTEN DO YOU HAVE A DRINK CONTAINING ALCOHOL: 1
HOW OFTEN DO YOU HAVE SIX OR MORE DRINKS ON ONE OCCASION: 1
HOW MANY STANDARD DRINKS CONTAINING ALCOHOL DO YOU HAVE ON A TYPICAL DAY: PATIENT DOES NOT DRINK
HOW OFTEN DO YOU HAVE A DRINK CONTAINING ALCOHOL: NEVER
HOW MANY STANDARD DRINKS CONTAINING ALCOHOL DO YOU HAVE ON A TYPICAL DAY: 0

## 2023-11-21 ENCOUNTER — OFFICE VISIT (OUTPATIENT)
Age: 83
End: 2023-11-21
Payer: MEDICARE

## 2023-11-21 VITALS
WEIGHT: 212 LBS | HEIGHT: 75 IN | BODY MASS INDEX: 26.36 KG/M2 | RESPIRATION RATE: 20 BRPM | DIASTOLIC BLOOD PRESSURE: 64 MMHG | OXYGEN SATURATION: 99 % | HEART RATE: 70 BPM | SYSTOLIC BLOOD PRESSURE: 112 MMHG | TEMPERATURE: 98.9 F

## 2023-11-21 DIAGNOSIS — Z00.00 MEDICARE ANNUAL WELLNESS VISIT, SUBSEQUENT: Primary | ICD-10-CM

## 2023-11-21 DIAGNOSIS — E11.9 TYPE 2 DIABETES MELLITUS WITHOUT COMPLICATION, WITHOUT LONG-TERM CURRENT USE OF INSULIN (HCC): ICD-10-CM

## 2023-11-21 DIAGNOSIS — D69.6 THROMBOCYTOPENIA, UNSPECIFIED (HCC): ICD-10-CM

## 2023-11-21 DIAGNOSIS — E78.2 MIXED HYPERLIPIDEMIA: ICD-10-CM

## 2023-11-21 DIAGNOSIS — I25.118 ATHEROSCLEROTIC HEART DISEASE OF NATIVE CORONARY ARTERY WITH OTHER FORMS OF ANGINA PECTORIS (HCC): ICD-10-CM

## 2023-11-21 DIAGNOSIS — I10 ESSENTIAL (PRIMARY) HYPERTENSION: ICD-10-CM

## 2023-11-21 PROCEDURE — G8484 FLU IMMUNIZE NO ADMIN: HCPCS | Performed by: INTERNAL MEDICINE

## 2023-11-21 PROCEDURE — 1123F ACP DISCUSS/DSCN MKR DOCD: CPT | Performed by: INTERNAL MEDICINE

## 2023-11-21 PROCEDURE — 3044F HG A1C LEVEL LT 7.0%: CPT | Performed by: INTERNAL MEDICINE

## 2023-11-21 PROCEDURE — 99214 OFFICE O/P EST MOD 30 MIN: CPT | Performed by: INTERNAL MEDICINE

## 2023-11-21 PROCEDURE — G8419 CALC BMI OUT NRM PARAM NOF/U: HCPCS | Performed by: INTERNAL MEDICINE

## 2023-11-21 PROCEDURE — G0439 PPPS, SUBSEQ VISIT: HCPCS | Performed by: INTERNAL MEDICINE

## 2023-11-21 PROCEDURE — G8427 DOCREV CUR MEDS BY ELIG CLIN: HCPCS | Performed by: INTERNAL MEDICINE

## 2023-11-21 PROCEDURE — 3078F DIAST BP <80 MM HG: CPT | Performed by: INTERNAL MEDICINE

## 2023-11-21 PROCEDURE — 1036F TOBACCO NON-USER: CPT | Performed by: INTERNAL MEDICINE

## 2023-11-21 PROCEDURE — 3074F SYST BP LT 130 MM HG: CPT | Performed by: INTERNAL MEDICINE

## 2023-11-21 NOTE — PROGRESS NOTES
Sandeep Robin is a 79 y.o.  male and presents with Diabetes, Hypertension, Cholesterol Problem (F/u), and Coronary Artery Disease      SUBJECTIVE:  Pt's HTN and Afib are well controlled on Toprol XL 50 mg. Patient had a non-ST elevation MI and underwent cardiac catheterization with placement of stent in April/May 2021. Patient was placed on dual antiplatelet therapy until April 2022. He is now just on aspirin 81 mg daily as well as Lipitor 20 mg daily for his dyslipidemia. His LDL is good at 55. Patient will follow up with cardiology for further management. Patient also status post pacemaker for sick sinus syndrome. Patient is followed by urology for history of prostate cancer. He had external beam radiation in the past.  Patient also was recently diagnosed with diabetes with an A1c of 6.5 at the [address] [place] [address]. He is trying to control this currently with diet by cutting back starches and sweets and increasing resistance training. Therefore no medication is needed at this time. Pt has h/o Prostate cancer for which he had XRT 3/9/2016 and is now followed by Urology yearly. Patient has thrombocytopenia that has been persistent for the last few years. Will recheck levels with next blood work.   If any change will refer to hematology for further evaluation    Respiratory ROS: negative for - shortness of breath  Cardiovascular ROS: negative for - chest pain    Current Outpatient Medications   Medication Sig    aspirin 81 MG EC tablet Take 1 tablet by mouth daily    atorvastatin (LIPITOR) 20 MG tablet TAKE 1 TABLET NIGHTLY    metoprolol succinate (TOPROL XL) 50 MG extended release tablet TAKE 1 TABLET DAILY    brimonidine (ALPHAGAN) 0.2 % ophthalmic solution Apply 1 drop to eye 2 times daily    latanoprost (XALATAN) 0.005 % ophthalmic solution Apply 1 drop to eye daily    nitroGLYCERIN (NITROLINGUAL) 0.4 MG/SPRAY 0.4 mg spray Place 1 spray under the tongue every 5 minutes as needed for Chest pain     No current
Selin Muse presents today for   Chief Complaint   Patient presents with    Diabetes    Hypertension    Cholesterol Problem     6 month follow up     Medicare AWV                 1. \"Have you been to the ER, urgent care clinic since your last visit? Hospitalized since your last visit? \" no    2. \"Have you seen or consulted any other health care providers outside of the [address] [place] [address] since your last visit? \" no     3. For patients aged 43-73: Has the patient had a colonoscopy / FIT/ Cologuard? NA - based on age      If the patient is female:    4. For patients aged 43-66: Has the patient had a mammogram within the past 2 years? NA - based on age or sex      11. For patients aged 21-65: Has the patient had a pap smear?  NA - based on age or sex
Comments) and Swelling     Other reaction(s): cardiac dysrhythmia    Iodine Swelling and Other (See Comments)    Shellfish-Derived Products      Prior to Visit Medications    Medication Sig Taking?  Authorizing Provider   aspirin 81 MG EC tablet Take 1 tablet by mouth daily Yes Provider, MD Marcia   atorvastatin (LIPITOR) 20 MG tablet TAKE 1 TABLET NIGHTLY Yes Noah Isaacs MD   metoprolol succinate (TOPROL XL) 50 MG extended release tablet TAKE 1 TABLET DAILY Yes Noah Isaacs MD   brimonidine (ALPHAGAN) 0.2 % ophthalmic solution Apply 1 drop to eye 2 times daily Yes Automatic Reconciliation, Ar   latanoprost (XALATAN) 0.005 % ophthalmic solution Apply 1 drop to eye daily Yes Automatic Reconciliation, Ar   nitroGLYCERIN (NITROLINGUAL) 0.4 MG/SPRAY 0.4 mg spray Place 1 spray under the tongue every 5 minutes as needed for Chest pain Yes Automatic Reconciliation, Ar       CareTeam (Including outside providers/suppliers regularly involved in providing care):   Patient Care Team:  Mian Hogue MD as PCP - General  Mian Hogue MD as PCP - Empaneled Provider  Jcarlos Rossi MD as Hospitalist     Reviewed and updated this visit:  Tobacco  Allergies  Meds  Problems  Med Hx  Surg Hx  Soc Hx  Fam Hx

## 2023-11-27 PROCEDURE — 93296 REM INTERROG EVL PM/IDS: CPT | Performed by: INTERNAL MEDICINE

## 2023-11-27 PROCEDURE — 93294 REM INTERROG EVL PM/LDLS PM: CPT | Performed by: INTERNAL MEDICINE

## 2023-11-27 NOTE — PROGRESS NOTES
Carelink:  dual Pacemaker. 12 years left on battery. Lead impedance and threshold WNL. A paced 99 %, V sensed 99 %.  0 episodes

## 2024-03-29 RX ORDER — METOPROLOL SUCCINATE 50 MG/1
TABLET, EXTENDED RELEASE ORAL
Qty: 90 TABLET | Refills: 3 | Status: SHIPPED | OUTPATIENT
Start: 2024-03-29

## 2024-04-01 ENCOUNTER — OFFICE VISIT (OUTPATIENT)
Age: 84
End: 2024-04-01
Payer: MEDICARE

## 2024-04-01 VITALS
WEIGHT: 212 LBS | HEIGHT: 75 IN | DIASTOLIC BLOOD PRESSURE: 80 MMHG | HEART RATE: 68 BPM | OXYGEN SATURATION: 100 % | BODY MASS INDEX: 26.36 KG/M2 | SYSTOLIC BLOOD PRESSURE: 128 MMHG

## 2024-04-01 DIAGNOSIS — I10 ESSENTIAL (PRIMARY) HYPERTENSION: ICD-10-CM

## 2024-04-01 DIAGNOSIS — I25.10 ATHEROSCLEROSIS OF NATIVE CORONARY ARTERY OF NATIVE HEART WITHOUT ANGINA PECTORIS: Primary | ICD-10-CM

## 2024-04-01 DIAGNOSIS — Z95.0 PRESENCE OF CARDIAC PACEMAKER: ICD-10-CM

## 2024-04-01 DIAGNOSIS — E11.9 TYPE 2 DIABETES MELLITUS WITHOUT COMPLICATION, UNSPECIFIED WHETHER LONG TERM INSULIN USE (HCC): ICD-10-CM

## 2024-04-01 DIAGNOSIS — I49.5 SICK SINUS SYNDROME (HCC): ICD-10-CM

## 2024-04-01 PROBLEM — N40.0 BENIGN PROSTATIC HYPERPLASIA WITHOUT LOWER URINARY TRACT SYMPTOMS: Status: ACTIVE | Noted: 2024-04-01

## 2024-04-01 PROCEDURE — 93000 ELECTROCARDIOGRAM COMPLETE: CPT | Performed by: INTERNAL MEDICINE

## 2024-04-01 PROCEDURE — G8427 DOCREV CUR MEDS BY ELIG CLIN: HCPCS | Performed by: INTERNAL MEDICINE

## 2024-04-01 PROCEDURE — 1123F ACP DISCUSS/DSCN MKR DOCD: CPT | Performed by: INTERNAL MEDICINE

## 2024-04-01 PROCEDURE — G8419 CALC BMI OUT NRM PARAM NOF/U: HCPCS | Performed by: INTERNAL MEDICINE

## 2024-04-01 PROCEDURE — 99214 OFFICE O/P EST MOD 30 MIN: CPT | Performed by: INTERNAL MEDICINE

## 2024-04-01 PROCEDURE — 3074F SYST BP LT 130 MM HG: CPT | Performed by: INTERNAL MEDICINE

## 2024-04-01 PROCEDURE — 3079F DIAST BP 80-89 MM HG: CPT | Performed by: INTERNAL MEDICINE

## 2024-04-01 PROCEDURE — 1036F TOBACCO NON-USER: CPT | Performed by: INTERNAL MEDICINE

## 2024-04-01 ASSESSMENT — PATIENT HEALTH QUESTIONNAIRE - PHQ9
SUM OF ALL RESPONSES TO PHQ QUESTIONS 1-9: 0
SUM OF ALL RESPONSES TO PHQ9 QUESTIONS 1 & 2: 0
SUM OF ALL RESPONSES TO PHQ QUESTIONS 1-9: 0
1. LITTLE INTEREST OR PLEASURE IN DOING THINGS: NOT AT ALL
SUM OF ALL RESPONSES TO PHQ QUESTIONS 1-9: 0
SUM OF ALL RESPONSES TO PHQ QUESTIONS 1-9: 0
2. FEELING DOWN, DEPRESSED OR HOPELESS: NOT AT ALL

## 2024-04-01 ASSESSMENT — ENCOUNTER SYMPTOMS
ABDOMINAL DISTENTION: 0
NAUSEA: 0
SORE THROAT: 0
VOMITING: 0
SHORTNESS OF BREATH: 0
COUGH: 0
ABDOMINAL PAIN: 0

## 2024-04-01 ASSESSMENT — ANXIETY QUESTIONNAIRES
GAD7 TOTAL SCORE: 0
5. BEING SO RESTLESS THAT IT IS HARD TO SIT STILL: NOT AT ALL
7. FEELING AFRAID AS IF SOMETHING AWFUL MIGHT HAPPEN: NOT AT ALL
2. NOT BEING ABLE TO STOP OR CONTROL WORRYING: NOT AT ALL
3. WORRYING TOO MUCH ABOUT DIFFERENT THINGS: NOT AT ALL
1. FEELING NERVOUS, ANXIOUS, OR ON EDGE: NOT AT ALL
4. TROUBLE RELAXING: NOT AT ALL
6. BECOMING EASILY ANNOYED OR IRRITABLE: NOT AT ALL

## 2024-04-01 NOTE — PROGRESS NOTES
Erik Gallegos presents today for   Chief Complaint   Patient presents with    Follow-up     6 month    Device Check     medtronic       Erik Gallegos preferred language for health care discussion is english/other.    Is someone accompanying this pt? no    Is the patient using any DME equipment during OV? no    Depression Screening:  Depression: Not at risk (4/1/2024)    PHQ-2     PHQ-2 Score: 0        Learning Assessment:  Who is the primary learner? Patient    What is the preferred language for health care of the primary learner? ENGLISH    How does the primary learner prefer to learn new concepts? DEMONSTRATION    Answered By patient    Relationship to Learner SELF           Pt currently taking Anticoagulant therapy? no    Pt currently taking Antiplatelet therapy ? Aspirin 81 mg daily      Coordination of Care:  1. Have you been to the ER, urgent care clinic since your last visit? Hospitalized since your last visit? no    2. Have you seen or consulted any other health care providers outside of the [facility] since your last visit? Include any pap smears or colon screening. no

## 2024-04-01 NOTE — PROGRESS NOTES
04/01/24     Erik Gallegos  is a 83 y.o. male     Chief Complaint   Patient presents with    Follow-up     6 month    Device Check     medtronic       HPI    Patient presents for a follow-up office visit. He has a past medical history significant for paroxysmal atrial fibrillation, hypertension, and sick sinus syndrome, status post dual-chamber permanent pacemaker in March 2011.  More recently, he was diagnosed with coronary disease in 2021.    He underwent an exercise nuclear stress test in February 2019 which was a normal low risk study.  He exercised 8 minutes on the treadmill using the Stephen protocol without any EKG changes or symptoms.  No ischemia.  EF 72%.    The patient was hospitalized in April 2021 with a non-ST elevation myocardial infarction.  He presented with acute onset chest pain was found to have an elevated troponin which peaked at 1.45.  He subsequently underwent a cardiac catheterization which demonstrated severe two-vessel coronary artery disease.  His culprit lesion was a 99% ostial LAD which underwent angioplasty and stenting with a single drug-eluting stent.  He is also found to have 85% mid LAD stenosis which underwent stenting with a second drug-eluting stent.  He had a residual distal left circumflex 70% lesion as well with plan for staged intervention in the future.  An echocardiogram during his hospital stay demonstrated preserved LVEF of 55 to 60% with grade 1 diastolic dysfunction, no valvular heart disease and normal PA pressures.    Patient subsequently underwent a staged PCI to his distal left circumflex with a another single drug-eluting stent in May 2021 using a Xience 3.0 x 12 mm stent.    He stopped his Brilinta as instructed at the end of May 2022.    He underwent a pacemaker generator exchange in October 2022.    More recently, he completed a pharmacologic nuclear stress test in May 2023 which is a low risk test.  No evidence of ischemia.  EF 72%.     He was last seen in our

## 2024-05-23 ENCOUNTER — HOSPITAL ENCOUNTER (OUTPATIENT)
Facility: HOSPITAL | Age: 84
Setting detail: SPECIMEN
Discharge: HOME | End: 2024-05-23
Payer: MEDICARE

## 2024-05-23 DIAGNOSIS — E11.9 TYPE 2 DIABETES MELLITUS WITHOUT COMPLICATION, WITHOUT LONG-TERM CURRENT USE OF INSULIN (HCC): ICD-10-CM

## 2024-05-23 DIAGNOSIS — I10 ESSENTIAL (PRIMARY) HYPERTENSION: ICD-10-CM

## 2024-05-23 DIAGNOSIS — D69.6 THROMBOCYTOPENIA, UNSPECIFIED (HCC): ICD-10-CM

## 2024-05-23 DIAGNOSIS — E78.2 MIXED HYPERLIPIDEMIA: ICD-10-CM

## 2024-05-23 LAB
ALBUMIN SERPL-MCNC: 3.8 G/DL (ref 3.4–5)
ALBUMIN/GLOB SERPL: 1.4 (ref 0.8–1.7)
ALP SERPL-CCNC: 73 U/L (ref 45–117)
ALT SERPL-CCNC: 27 U/L (ref 16–61)
ANION GAP SERPL CALC-SCNC: 2 MMOL/L (ref 3–18)
AST SERPL-CCNC: 23 U/L (ref 10–38)
BASOPHILS # BLD: 0 K/UL (ref 0–0.1)
BASOPHILS NFR BLD: 0 % (ref 0–2)
BILIRUB SERPL-MCNC: 1.1 MG/DL (ref 0.2–1)
BUN SERPL-MCNC: 18 MG/DL (ref 7–18)
BUN/CREAT SERPL: 14 (ref 12–20)
CALCIUM SERPL-MCNC: 9.2 MG/DL (ref 8.5–10.1)
CHLORIDE SERPL-SCNC: 109 MMOL/L (ref 100–111)
CHOLEST SERPL-MCNC: 99 MG/DL
CO2 SERPL-SCNC: 29 MMOL/L (ref 21–32)
CREAT SERPL-MCNC: 1.25 MG/DL (ref 0.6–1.3)
DIFFERENTIAL METHOD BLD: ABNORMAL
EOSINOPHIL # BLD: 0 K/UL (ref 0–0.4)
EOSINOPHIL NFR BLD: 0 % (ref 0–5)
ERYTHROCYTE [DISTWIDTH] IN BLOOD BY AUTOMATED COUNT: 12.5 % (ref 11.6–14.5)
EST. AVERAGE GLUCOSE BLD GHB EST-MCNC: 137 MG/DL
GLOBULIN SER CALC-MCNC: 2.8 G/DL (ref 2–4)
GLUCOSE SERPL-MCNC: 109 MG/DL (ref 74–99)
HBA1C MFR BLD: 6.4 % (ref 4.2–5.6)
HCT VFR BLD AUTO: 41.6 % (ref 36–48)
HDLC SERPL-MCNC: 44 MG/DL (ref 40–60)
HDLC SERPL: 2.3 (ref 0–5)
HGB BLD-MCNC: 13.3 G/DL (ref 13–16)
IMM GRANULOCYTES # BLD AUTO: 0 K/UL (ref 0–0.04)
IMM GRANULOCYTES NFR BLD AUTO: 0 % (ref 0–0.5)
LDLC SERPL CALC-MCNC: 47.2 MG/DL (ref 0–100)
LIPID PANEL: NORMAL
LYMPHOCYTES # BLD: 1 K/UL (ref 0.9–3.6)
LYMPHOCYTES NFR BLD: 12 % (ref 21–52)
MCH RBC QN AUTO: 29.6 PG (ref 24–34)
MCHC RBC AUTO-ENTMCNC: 32 G/DL (ref 31–37)
MCV RBC AUTO: 92.7 FL (ref 78–100)
MONOCYTES # BLD: 2.5 K/UL (ref 0.05–1.2)
MONOCYTES NFR BLD: 31 % (ref 3–10)
NEUTS BAND NFR BLD MANUAL: 2 %
NEUTS SEG # BLD: 4.6 K/UL (ref 1.8–8)
NEUTS SEG NFR BLD: 55 % (ref 40–73)
NRBC # BLD: 0 K/UL (ref 0–0.01)
NRBC BLD-RTO: 0 PER 100 WBC
PLATELET # BLD AUTO: 84 K/UL (ref 135–420)
PLATELET COMMENT: ABNORMAL
PMV BLD AUTO: 12.5 FL (ref 9.2–11.8)
POTASSIUM SERPL-SCNC: 4.8 MMOL/L (ref 3.5–5.5)
PROT SERPL-MCNC: 6.6 G/DL (ref 6.4–8.2)
RBC # BLD AUTO: 4.49 M/UL (ref 4.35–5.65)
RBC MORPH BLD: ABNORMAL
SODIUM SERPL-SCNC: 140 MMOL/L (ref 136–145)
TRIGL SERPL-MCNC: 39 MG/DL
VLDLC SERPL CALC-MCNC: 7.8 MG/DL
WBC # BLD AUTO: 8.1 K/UL (ref 4.6–13.2)

## 2024-05-23 PROCEDURE — 85025 COMPLETE CBC W/AUTO DIFF WBC: CPT

## 2024-05-23 PROCEDURE — 80053 COMPREHEN METABOLIC PANEL: CPT

## 2024-05-23 PROCEDURE — 80061 LIPID PANEL: CPT

## 2024-05-23 PROCEDURE — 36415 COLL VENOUS BLD VENIPUNCTURE: CPT

## 2024-05-23 PROCEDURE — 83036 HEMOGLOBIN GLYCOSYLATED A1C: CPT

## 2024-05-29 ENCOUNTER — NURSE ONLY (OUTPATIENT)
Age: 84
End: 2024-05-29

## 2024-05-29 DIAGNOSIS — I49.5 SICK SINUS SYNDROME (HCC): ICD-10-CM

## 2024-05-29 DIAGNOSIS — Z95.0 PRESENCE OF CARDIAC PACEMAKER: Primary | ICD-10-CM

## 2024-06-03 RX ORDER — ATORVASTATIN CALCIUM 20 MG/1
TABLET, FILM COATED ORAL
Qty: 90 TABLET | Refills: 3 | Status: SHIPPED | OUTPATIENT
Start: 2024-06-03

## 2024-06-06 ENCOUNTER — HOSPITAL ENCOUNTER (OUTPATIENT)
Facility: HOSPITAL | Age: 84
Setting detail: SPECIMEN
Discharge: HOME | End: 2024-06-06
Payer: MEDICARE

## 2024-06-06 DIAGNOSIS — E11.9 TYPE 2 DIABETES MELLITUS WITHOUT COMPLICATION, WITHOUT LONG-TERM CURRENT USE OF INSULIN (HCC): ICD-10-CM

## 2024-06-06 PROBLEM — E11.22 TYPE 2 DIABETES MELLITUS WITH CHRONIC KIDNEY DISEASE (HCC): Status: ACTIVE | Noted: 2024-06-06

## 2024-06-06 PROBLEM — I25.118 ATHEROSCLEROTIC HEART DISEASE OF NATIVE CORONARY ARTERY WITH OTHER FORMS OF ANGINA PECTORIS (HCC): Status: ACTIVE | Noted: 2021-04-12

## 2024-06-06 LAB
CREAT UR-MCNC: 172 MG/DL (ref 30–125)
MICROALBUMIN UR-MCNC: 3.6 MG/DL (ref 0–3)
MICROALBUMIN/CREAT UR-RTO: 21 MG/G (ref 0–30)

## 2024-06-06 PROCEDURE — 82043 UR ALBUMIN QUANTITATIVE: CPT

## 2024-06-06 PROCEDURE — 82570 ASSAY OF URINE CREATININE: CPT

## 2024-06-13 ENCOUNTER — HOSPITAL ENCOUNTER (OUTPATIENT)
Facility: HOSPITAL | Age: 84
Discharge: HOME | End: 2024-06-13
Attending: INTERNAL MEDICINE
Payer: MEDICARE

## 2024-06-13 DIAGNOSIS — R10.11 RIGHT UPPER QUADRANT ABDOMINAL PAIN: ICD-10-CM

## 2024-06-13 DIAGNOSIS — D69.6 THROMBOCYTOPENIA, UNSPECIFIED (HCC): ICD-10-CM

## 2024-06-13 PROCEDURE — 74176 CT ABD & PELVIS W/O CONTRAST: CPT

## 2024-07-22 ENCOUNTER — TELEPHONE (OUTPATIENT)
Facility: HOSPITAL | Age: 84
End: 2024-07-22

## 2024-07-23 ENCOUNTER — HOSPITAL ENCOUNTER (OUTPATIENT)
Facility: HOSPITAL | Age: 84
Discharge: HOME | End: 2024-07-26
Payer: MEDICARE

## 2024-07-23 VITALS
HEART RATE: 66 BPM | BODY MASS INDEX: 27.21 KG/M2 | TEMPERATURE: 97.8 F | RESPIRATION RATE: 17 BRPM | DIASTOLIC BLOOD PRESSURE: 87 MMHG | OXYGEN SATURATION: 97 % | WEIGHT: 212 LBS | HEIGHT: 74 IN | SYSTOLIC BLOOD PRESSURE: 137 MMHG

## 2024-07-23 DIAGNOSIS — D72.821 MONOCYTOSIS: ICD-10-CM

## 2024-07-23 DIAGNOSIS — D69.6 THROMBOCYTOPENIA (HCC): ICD-10-CM

## 2024-07-23 LAB
ANION GAP SERPL CALC-SCNC: 0 MMOL/L (ref 3–18)
APTT PPP: 33.6 SEC (ref 23–36.4)
BASOPHILS # BLD: 0.1 K/UL (ref 0–0.1)
BASOPHILS NFR BLD: 1 % (ref 0–2)
BUN SERPL-MCNC: 15 MG/DL (ref 7–18)
BUN/CREAT SERPL: 12 (ref 12–20)
CALCIUM SERPL-MCNC: 9.1 MG/DL (ref 8.5–10.1)
CHLORIDE SERPL-SCNC: 110 MMOL/L (ref 100–111)
CO2 SERPL-SCNC: 31 MMOL/L (ref 21–32)
CREAT SERPL-MCNC: 1.24 MG/DL (ref 0.6–1.3)
DIFFERENTIAL METHOD BLD: ABNORMAL
EOSINOPHIL # BLD: 0 K/UL (ref 0–0.4)
EOSINOPHIL NFR BLD: 0 % (ref 0–5)
ERYTHROCYTE [DISTWIDTH] IN BLOOD BY AUTOMATED COUNT: 12.5 % (ref 11.6–14.5)
GLUCOSE SERPL-MCNC: 122 MG/DL (ref 74–99)
HCT VFR BLD AUTO: 41.7 % (ref 36–48)
HGB BLD-MCNC: 13.5 G/DL (ref 13–16)
IMM GRANULOCYTES # BLD AUTO: 0 K/UL (ref 0–0.04)
IMM GRANULOCYTES NFR BLD AUTO: 0 % (ref 0–0.5)
INR PPP: 1.2 (ref 0.9–1.1)
LYMPHOCYTES # BLD: 0.9 K/UL (ref 0.9–3.6)
LYMPHOCYTES NFR BLD: 10 % (ref 21–52)
MCH RBC QN AUTO: 29.5 PG (ref 24–34)
MCHC RBC AUTO-ENTMCNC: 32.4 G/DL (ref 31–37)
MCV RBC AUTO: 91 FL (ref 78–100)
MONOCYTES # BLD: 3.3 K/UL (ref 0.05–1.2)
MONOCYTES NFR BLD: 36 % (ref 3–10)
NEUTS SEG # BLD: 4.9 K/UL (ref 1.8–8)
NEUTS SEG NFR BLD: 53 % (ref 40–73)
NRBC # BLD: 0 K/UL (ref 0–0.01)
NRBC BLD-RTO: 0 PER 100 WBC
PLATELET # BLD AUTO: 81 K/UL (ref 135–420)
PLATELET COMMENT: ABNORMAL
PMV BLD AUTO: 11.3 FL (ref 9.2–11.8)
POTASSIUM SERPL-SCNC: 4.3 MMOL/L (ref 3.5–5.5)
PROTHROMBIN TIME: 15.8 SEC (ref 11.9–14.9)
RBC # BLD AUTO: 4.58 M/UL (ref 4.35–5.65)
RBC MORPH BLD: ABNORMAL
SODIUM SERPL-SCNC: 141 MMOL/L (ref 136–145)
WBC # BLD AUTO: 9.2 K/UL (ref 4.6–13.2)

## 2024-07-23 PROCEDURE — 88264 CHROMOSOME ANALYSIS 20-25: CPT

## 2024-07-23 PROCEDURE — 88313 SPECIAL STAINS GROUP 2: CPT

## 2024-07-23 PROCEDURE — 88374 M/PHMTRC ALYS ISHQUANT/SEMIQ: CPT

## 2024-07-23 PROCEDURE — 99156 MOD SED OTH PHYS/QHP 5/>YRS: CPT

## 2024-07-23 PROCEDURE — 85610 PROTHROMBIN TIME: CPT

## 2024-07-23 PROCEDURE — 88305 TISSUE EXAM BY PATHOLOGIST: CPT

## 2024-07-23 PROCEDURE — 2500000003 HC RX 250 WO HCPCS: Performed by: STUDENT IN AN ORGANIZED HEALTH CARE EDUCATION/TRAINING PROGRAM

## 2024-07-23 PROCEDURE — 6360000002 HC RX W HCPCS: Performed by: STUDENT IN AN ORGANIZED HEALTH CARE EDUCATION/TRAINING PROGRAM

## 2024-07-23 PROCEDURE — 85025 COMPLETE CBC W/AUTO DIFF WBC: CPT

## 2024-07-23 PROCEDURE — 80048 BASIC METABOLIC PNL TOTAL CA: CPT

## 2024-07-23 PROCEDURE — 88185 FLOWCYTOMETRY/TC ADD-ON: CPT

## 2024-07-23 PROCEDURE — 88184 FLOWCYTOMETRY/ TC 1 MARKER: CPT

## 2024-07-23 PROCEDURE — 85730 THROMBOPLASTIN TIME PARTIAL: CPT

## 2024-07-23 PROCEDURE — 88237 TISSUE CULTURE BONE MARROW: CPT

## 2024-07-23 PROCEDURE — 88311 DECALCIFY TISSUE: CPT

## 2024-07-23 RX ORDER — LIDOCAINE HYDROCHLORIDE 10 MG/ML
INJECTION, SOLUTION EPIDURAL; INFILTRATION; INTRACAUDAL; PERINEURAL PRN
Status: COMPLETED | OUTPATIENT
Start: 2024-07-23 | End: 2024-07-23

## 2024-07-23 RX ORDER — MIDAZOLAM HYDROCHLORIDE 1 MG/ML
INJECTION INTRAMUSCULAR; INTRAVENOUS PRN
Status: COMPLETED | OUTPATIENT
Start: 2024-07-23 | End: 2024-07-23

## 2024-07-23 RX ORDER — FENTANYL CITRATE 50 UG/ML
INJECTION, SOLUTION INTRAMUSCULAR; INTRAVENOUS PRN
Status: COMPLETED | OUTPATIENT
Start: 2024-07-23 | End: 2024-07-23

## 2024-07-23 RX ORDER — SODIUM CHLORIDE 9 MG/ML
INJECTION, SOLUTION INTRAVENOUS CONTINUOUS
Status: DISCONTINUED | OUTPATIENT
Start: 2024-07-23 | End: 2024-07-27 | Stop reason: HOSPADM

## 2024-07-23 RX ADMIN — MIDAZOLAM 1 MG: 1 INJECTION INTRAMUSCULAR; INTRAVENOUS at 09:43

## 2024-07-23 RX ADMIN — MIDAZOLAM 1 MG: 1 INJECTION INTRAMUSCULAR; INTRAVENOUS at 09:36

## 2024-07-23 RX ADMIN — FENTANYL CITRATE 50 MCG: 50 INJECTION INTRAMUSCULAR; INTRAVENOUS at 09:43

## 2024-07-23 RX ADMIN — FENTANYL CITRATE 50 MCG: 50 INJECTION INTRAMUSCULAR; INTRAVENOUS at 09:36

## 2024-07-23 RX ADMIN — LIDOCAINE HYDROCHLORIDE 10 ML: 10 INJECTION, SOLUTION EPIDURAL; INFILTRATION; INTRACAUDAL; PERINEURAL at 09:44

## 2024-07-23 NOTE — PROGRESS NOTES
TRANSFER - OUT REPORT:    Verbal report given to GINA Rodriguez (name) on Edward S Young being transferred to cath holding (unit) for routine post-op       Report consisted of patient's Situation, Background, Assessment and   Recommendations(SBAR).     Information from the following report(s) Nurse Handoff Report was reviewed with the receiving nurse.    Opportunity for questions and clarification was provided.      Patient transported with:   Registered Nurse

## 2024-07-23 NOTE — H&P
MEDTRONIC    Personal history of malignant neoplasm of prostate     T1a, [place] 6 (3+3)     Prostate cancer (HCC)     PUD (peptic ulcer disease)     Sick sinus syndrome (HCC)     status post implantation of Medtronic dual-chamber permanent pacemaker 3/22/11.    Unspecified essential hypertension     Venereal disease           Past Surgical History:   Procedure Laterality Date    CATARACT REMOVAL      COLONOSCOPY FLX DX W/COLLJ SPEC WHEN PFRMD      CRYOSURG ABLATION OF PROSTATE  2/08    [facility], Dr. Matthieu Hickey    MOHS SURGERY  1996     Data:    /66   Pulse 60   Temp 97.8 °F (36.6 °C) (Oral)   Resp 19   Ht 1.88 m (6' 2\")   Wt 96.2 kg (212 lb)   SpO2 98%   BMI 27.22 kg/m² :  Recent Labs     07/23/24  0808   PLT 81*   HCT 41.7     Recent Labs     07/23/24  0808   INR 1.2*   APTT 33.6       The H & P and/or progress notes and any available imaging were reviewed.  The risks, indications and possible alternatives to the procedure, including doing nothing, were discussed and informed consent was obtained.    Physical Exam:      Mental status:   Alert and oriented.   Examination specific to the procedure proposed to be performed and any co morbid conditions:   Mallampati classification 3 ,  ASA III   Heart:   RRR.   Lungs:   CTAB.  No wheezes, rales or rhonchi.    The patient is an appropriate candidate to undergo the planned procedure and sedation.    PRICE LOPEZ PA-C

## 2024-07-23 NOTE — PROGRESS NOTES
Cath holding summary:    0745: Patient ambulated from waiting area without difficulty, placed on monitor. A&O x 4, no c/o pain. NPO since midnight, ID and allergies verified. H&P reviewed, med rec completed. PIV x 1 inserted, blood sent to lab. Groin prep completed, consent ready for signature.    0902: Verbal report given to Jordon on Erik Gallegos being transferred to IR Lab for ordered procedure. Report consisted of patient's Situation, Background, Assessment and Recommendations (SBAR). Information from the following report(s) MAR, Neuro Assessment, and Event Log was reviewed with the receiving nurse. Opportunity for questions and clarification was provided.    1000: Verbal report received from Wanda FUENTES on Erik Gallegos being received from IR Lab for routine post-op. Report consisted of patient's Situation, Background, Assessment and Recommendations (SBAR). Information from the following report(s) MAR, Neuro Assessment, and Event Log was reviewed with the receiving nurse. Pt A&O x 4, no c/o pain. Opportunity for questions and clarification provided.  Procedure: IR Procedure  Intervention: N/A  Site: Left, Ischium    1118: AVS Discharge instructions reviewed with patient and copy given to patient.  All questions answered.  Patient verbalized understanding to all discharge instructions.  PIV removed. Procedural site within normal limits.  No hematoma or bleeding noted from procedural and PIV site. No pain noted at discharge. Patient back to neurological baseline, A&O x 4. Patient discharged in the presence of a responsible adult (Sister, Teetee) who will accompany patient home and is able to report post procedure complications.

## 2024-08-27 ENCOUNTER — HOSPITAL ENCOUNTER (OUTPATIENT)
Facility: HOSPITAL | Age: 84
Setting detail: SPECIMEN
Discharge: HOME | End: 2024-08-30
Payer: MEDICARE

## 2024-08-27 DIAGNOSIS — N18.31 TYPE 2 DIABETES MELLITUS WITH STAGE 3A CHRONIC KIDNEY DISEASE, WITHOUT LONG-TERM CURRENT USE OF INSULIN (HCC): ICD-10-CM

## 2024-08-27 DIAGNOSIS — D69.6 THROMBOCYTOPENIA, UNSPECIFIED (HCC): ICD-10-CM

## 2024-08-27 DIAGNOSIS — I10 ESSENTIAL (PRIMARY) HYPERTENSION: ICD-10-CM

## 2024-08-27 DIAGNOSIS — E78.2 MIXED HYPERLIPIDEMIA: ICD-10-CM

## 2024-08-27 DIAGNOSIS — E11.22 TYPE 2 DIABETES MELLITUS WITH STAGE 3A CHRONIC KIDNEY DISEASE, WITHOUT LONG-TERM CURRENT USE OF INSULIN (HCC): ICD-10-CM

## 2024-08-27 LAB
ALBUMIN SERPL-MCNC: 4.1 G/DL (ref 3.4–5)
ALBUMIN/GLOB SERPL: 1.4 (ref 0.8–1.7)
ALP SERPL-CCNC: 81 U/L (ref 45–117)
ALT SERPL-CCNC: 26 U/L (ref 16–61)
ANION GAP SERPL CALC-SCNC: 6 MMOL/L (ref 3–18)
AST SERPL-CCNC: 25 U/L (ref 10–38)
BASOPHILS # BLD: 0.1 K/UL (ref 0–0.1)
BASOPHILS NFR BLD: 1 % (ref 0–2)
BILIRUB SERPL-MCNC: 1 MG/DL (ref 0.2–1)
BUN SERPL-MCNC: 17 MG/DL (ref 7–18)
BUN/CREAT SERPL: 13 (ref 12–20)
CALCIUM SERPL-MCNC: 9.4 MG/DL (ref 8.5–10.1)
CHLORIDE SERPL-SCNC: 106 MMOL/L (ref 100–111)
CHOLEST SERPL-MCNC: 109 MG/DL
CO2 SERPL-SCNC: 28 MMOL/L (ref 21–32)
CREAT SERPL-MCNC: 1.28 MG/DL (ref 0.6–1.3)
DIFFERENTIAL METHOD BLD: ABNORMAL
EOSINOPHIL # BLD: 0 K/UL (ref 0–0.4)
EOSINOPHIL NFR BLD: 0 % (ref 0–5)
ERYTHROCYTE [DISTWIDTH] IN BLOOD BY AUTOMATED COUNT: 12.4 % (ref 11.6–14.5)
EST. AVERAGE GLUCOSE BLD GHB EST-MCNC: 140 MG/DL
GLOBULIN SER CALC-MCNC: 2.9 G/DL (ref 2–4)
GLUCOSE SERPL-MCNC: 113 MG/DL (ref 74–99)
HBA1C MFR BLD: 6.5 % (ref 4.2–5.6)
HCT VFR BLD AUTO: 43.8 % (ref 36–48)
HDLC SERPL-MCNC: 48 MG/DL (ref 40–60)
HDLC SERPL: 2.3 (ref 0–5)
HGB BLD-MCNC: 13.9 G/DL (ref 13–16)
IMM GRANULOCYTES # BLD AUTO: 0 K/UL (ref 0–0.04)
IMM GRANULOCYTES NFR BLD AUTO: 0 % (ref 0–0.5)
LDLC SERPL CALC-MCNC: 49.4 MG/DL (ref 0–100)
LIPID PANEL: NORMAL
LYMPHOCYTES # BLD: 0.6 K/UL (ref 0.9–3.6)
LYMPHOCYTES NFR BLD: 7 % (ref 21–52)
MCH RBC QN AUTO: 29.4 PG (ref 24–34)
MCHC RBC AUTO-ENTMCNC: 31.7 G/DL (ref 31–37)
MCV RBC AUTO: 92.8 FL (ref 78–100)
MONOCYTES # BLD: 3.9 K/UL (ref 0.05–1.2)
MONOCYTES NFR BLD: 43 % (ref 3–10)
NEUTS SEG # BLD: 4.4 K/UL (ref 1.8–8)
NEUTS SEG NFR BLD: 49 % (ref 40–73)
NRBC # BLD: 0 K/UL (ref 0–0.01)
NRBC BLD-RTO: 0 PER 100 WBC
PLATELET # BLD AUTO: 87 K/UL (ref 135–420)
PLATELET COMMENT: ABNORMAL
PMV BLD AUTO: 12.3 FL (ref 9.2–11.8)
POTASSIUM SERPL-SCNC: 4.8 MMOL/L (ref 3.5–5.5)
PROT SERPL-MCNC: 7 G/DL (ref 6.4–8.2)
RBC # BLD AUTO: 4.72 M/UL (ref 4.35–5.65)
RBC MORPH BLD: ABNORMAL
RBC MORPH BLD: ABNORMAL
SODIUM SERPL-SCNC: 140 MMOL/L (ref 136–145)
TRIGL SERPL-MCNC: 58 MG/DL
VLDLC SERPL CALC-MCNC: 11.6 MG/DL
WBC # BLD AUTO: 9 K/UL (ref 4.6–13.2)

## 2024-08-27 PROCEDURE — 36415 COLL VENOUS BLD VENIPUNCTURE: CPT

## 2024-08-27 PROCEDURE — 80061 LIPID PANEL: CPT

## 2024-08-27 PROCEDURE — 83036 HEMOGLOBIN GLYCOSYLATED A1C: CPT

## 2024-08-27 PROCEDURE — 85025 COMPLETE CBC W/AUTO DIFF WBC: CPT

## 2024-08-27 PROCEDURE — 80053 COMPREHEN METABOLIC PANEL: CPT

## 2024-08-29 ENCOUNTER — TELEPHONE (OUTPATIENT)
Facility: CLINIC | Age: 84
End: 2024-08-29

## 2024-08-29 NOTE — TELEPHONE ENCOUNTER
Pt called in states tested covid positive today and wanted to know if he needed an appointment or if he should treat symptomatically. Pt states has a cough and runny nose. No other symptoms and has been taking mucinex.     Please advise.       Pharmacy   [id number] - [place], [place] - [address] - P [phone] - F [phone] [[id number]]     Call back req

## 2024-08-29 NOTE — TELEPHONE ENCOUNTER
If he feels he is improving ok to continue what he is doing. If symptoms are not improving or getting worst [facility]

## 2024-09-06 ENCOUNTER — OFFICE VISIT (OUTPATIENT)
Facility: CLINIC | Age: 84
End: 2024-09-06

## 2024-09-06 VITALS
RESPIRATION RATE: 18 BRPM | WEIGHT: 206 LBS | DIASTOLIC BLOOD PRESSURE: 64 MMHG | OXYGEN SATURATION: 95 % | SYSTOLIC BLOOD PRESSURE: 116 MMHG | HEART RATE: 66 BPM | BODY MASS INDEX: 26.44 KG/M2 | HEIGHT: 74 IN | TEMPERATURE: 97.4 F

## 2024-09-06 DIAGNOSIS — E11.22 TYPE 2 DIABETES MELLITUS WITH STAGE 3A CHRONIC KIDNEY DISEASE, WITHOUT LONG-TERM CURRENT USE OF INSULIN (HCC): Primary | ICD-10-CM

## 2024-09-06 DIAGNOSIS — I10 ESSENTIAL (PRIMARY) HYPERTENSION: ICD-10-CM

## 2024-09-06 DIAGNOSIS — I72.3 BILATERAL ANEURYSM OF ILIAC ARTERY (HCC): ICD-10-CM

## 2024-09-06 DIAGNOSIS — N18.31 TYPE 2 DIABETES MELLITUS WITH STAGE 3A CHRONIC KIDNEY DISEASE, WITHOUT LONG-TERM CURRENT USE OF INSULIN (HCC): Primary | ICD-10-CM

## 2024-09-06 DIAGNOSIS — E78.2 MIXED HYPERLIPIDEMIA: ICD-10-CM

## 2024-09-06 DIAGNOSIS — D69.6 THROMBOCYTOPENIA, UNSPECIFIED (HCC): ICD-10-CM

## 2024-09-06 NOTE — PROGRESS NOTES
Erik Gallegos is a 83 y.o.  male and presents with Diabetes, Hypertension, Cholesterol Problem (F/u), and Coronary Artery Disease      SUBJECTIVE:  Pt's HTN and Afib are well controlled on Toprol XL 50 mg.  Patient had a non-ST elevation MI and underwent cardiac catheterization with placement of stent in April/May 2021.  Patient was placed on dual antiplatelet therapy until April 2022.  He is now just on aspirin 81 mg daily as well as Lipitor 20 mg daily for his dyslipidemia.    .  Patient will follow up with cardiology for further management.  Patient also status post pacemaker for sick sinus syndrome.  Patient is followed by urology for history of prostate cancer.  He had external beam radiation in the past.  Patient  [facility]  [facility]  He is trying to control this currently with diet by cutting back starches and sweets and increasing resistance training.  Therefore no medication is needed at this time.  He does have chronic kidney disease stage IIIa that has remained fairly stable.  Patient may be a good candidate for SGLT2 in the future.    Pt has h/o Prostate cancer for which he had XRT 3/9/2016 and is now followed by Urology yearly.    Patient has thrombocytopenia that has been persistent for the last few years.  It appears this to have started in 2021 when he had his MI and started antiplatelet therapy.  He is now being managed by hematology after having a bone marrow biopsy which did not show any pathology to explain his low platelets at this time.  He did have a recent CT of his abdomen which showed some mild dilation of his iliac arteries so we will go ahead and refer him to vascular surgery for further recommendations.  He does have a history of venous insufficiency and some asymmetry in his lower extremity edema.    Respiratory ROS: negative for - shortness of breath  Cardiovascular ROS: negative for - chest pain    Current Outpatient Medications   Medication Sig

## 2024-09-06 NOTE — PROGRESS NOTES
Erik Gallegos presents today for   Chief Complaint   Patient presents with    Cholesterol Problem    Hypertension    Diabetes     3 month follow up            \"Have you been to the ER, urgent care clinic since your last visit?  Hospitalized since your last visit?\"    NO    “Have you seen or consulted any other health care providers outside of [facility] since your last visit?”    NO

## 2024-09-25 ENCOUNTER — OFFICE VISIT (OUTPATIENT)
Age: 84
End: 2024-09-25
Payer: MEDICARE

## 2024-09-25 VITALS
HEIGHT: 74 IN | BODY MASS INDEX: 26.44 KG/M2 | HEART RATE: 60 BPM | DIASTOLIC BLOOD PRESSURE: 64 MMHG | OXYGEN SATURATION: 100 % | WEIGHT: 206 LBS | RESPIRATION RATE: 18 BRPM | SYSTOLIC BLOOD PRESSURE: 120 MMHG

## 2024-09-25 DIAGNOSIS — I72.3 ANEURYSM OF LEFT COMMON ILIAC ARTERY (HCC): ICD-10-CM

## 2024-09-25 DIAGNOSIS — I72.3 ANEURYSM OF RIGHT INTERNAL ILIAC ARTERY (HCC): Primary | ICD-10-CM

## 2024-09-25 DIAGNOSIS — I72.3 ANEURYSM OF RIGHT COMMON ILIAC ARTERY (HCC): ICD-10-CM

## 2024-09-25 PROCEDURE — 1123F ACP DISCUSS/DSCN MKR DOCD: CPT | Performed by: SURGERY

## 2024-09-25 PROCEDURE — 3074F SYST BP LT 130 MM HG: CPT | Performed by: SURGERY

## 2024-09-25 PROCEDURE — 3078F DIAST BP <80 MM HG: CPT | Performed by: SURGERY

## 2024-09-25 PROCEDURE — 1036F TOBACCO NON-USER: CPT | Performed by: SURGERY

## 2024-09-25 PROCEDURE — G8419 CALC BMI OUT NRM PARAM NOF/U: HCPCS | Performed by: SURGERY

## 2024-09-25 PROCEDURE — 99204 OFFICE O/P NEW MOD 45 MIN: CPT | Performed by: SURGERY

## 2024-09-25 PROCEDURE — G8427 DOCREV CUR MEDS BY ELIG CLIN: HCPCS | Performed by: SURGERY

## 2024-09-25 NOTE — PROGRESS NOTES
[facility]    Vascular Surgery Office Visit    Erik FLOR Young  Chief Complaint   Patient presents with    bilat aneurysm of iliac arteries       History:  83 y.o. male former smoker, with a hx of CAD and PCI, CKD IIIa, and prostate CA, referred for evaluation of incidentally discovered bilateral common iliac artery aneurysms. No known family hx of aortic aneurysms. He has a hx of prostate cancer. He had an episode of RUQ pain approx 3 months ago (June 2024) and a noncontrast CT scan of the abdomen/pelvis was ordered. This demonstrated bilateral 2cm YOLANDA aneurysms and a 1.4cm R IIA aneurysm. He denies abdominal or back pain. No claudication or rest pain.       Physical Exam:    /64 (Site: Left Upper Arm, Position: Sitting, Cuff Size: Medium Adult)   Pulse 60   Resp 18   Ht 1.88 m (6' 2\")   Wt 93.4 kg (206 lb)   SpO2 100%   BMI 26.45 kg/m²      Constitutional:  Patient is well developed, well nourished, and not distressed.   HEENT: Atraumatic, normocephalic. No carotid bruits appreciated.  Cardiovascular:  Normal rate, regular rhythm, normal heart sounds.  Pulmonary/Chest: Effort normal and breath sounds normal.    Abdominal:   Soft, non-distended. No tenderness to palpation.   Extremities: BLE warm  Neurological:  he  is alert and oriented x3. Motor & sensory grossly intact in all 4 limbs.       Imaging/Studies:   June 2024  CT a/p without contrast: R YOLANDA 2.3 cm. L YOLANDA 2.1cm. 1.4cm R IIA aneurysm. L IIA 1.1cm.  Radiologist reported a slight increase in size of aneurysms compared to a study in 2015.     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  Advised him to inform 1st degree relatives of his aneurysm diagnosis, and to recommend duplex

## 2024-10-17 ENCOUNTER — OFFICE VISIT (OUTPATIENT)
Age: 84
End: 2024-10-17

## 2024-10-17 VITALS
HEIGHT: 74 IN | OXYGEN SATURATION: 95 % | SYSTOLIC BLOOD PRESSURE: 130 MMHG | WEIGHT: 206 LBS | BODY MASS INDEX: 26.44 KG/M2 | HEART RATE: 61 BPM | DIASTOLIC BLOOD PRESSURE: 68 MMHG

## 2024-10-17 DIAGNOSIS — I49.5 SICK SINUS SYNDROME (HCC): ICD-10-CM

## 2024-10-17 DIAGNOSIS — I25.10 ATHEROSCLEROSIS OF NATIVE CORONARY ARTERY OF NATIVE HEART WITHOUT ANGINA PECTORIS: Primary | ICD-10-CM

## 2024-10-17 DIAGNOSIS — E11.9 TYPE 2 DIABETES MELLITUS WITHOUT COMPLICATION, UNSPECIFIED WHETHER LONG TERM INSULIN USE (HCC): ICD-10-CM

## 2024-10-17 DIAGNOSIS — Z95.0 PRESENCE OF CARDIAC PACEMAKER: ICD-10-CM

## 2024-10-17 DIAGNOSIS — I10 ESSENTIAL (PRIMARY) HYPERTENSION: ICD-10-CM

## 2024-10-17 PROBLEM — D72.821 MONOCYTOSIS: Status: ACTIVE | Noted: 2024-10-17

## 2024-10-17 PROBLEM — Z77.29 EXPOSURE TO POTENTIALLY HAZARDOUS SUBSTANCE: Status: ACTIVE | Noted: 2024-10-17

## 2024-10-17 PROBLEM — D69.6 DISORDER INVOLVING THROMBOCYTOPENIA (HCC): Status: ACTIVE | Noted: 2024-10-17

## 2024-10-17 ASSESSMENT — ENCOUNTER SYMPTOMS
NAUSEA: 0
ABDOMINAL DISTENTION: 0
VOMITING: 0
ABDOMINAL PAIN: 0
SORE THROAT: 0
COUGH: 0
SHORTNESS OF BREATH: 0

## 2024-10-17 ASSESSMENT — PATIENT HEALTH QUESTIONNAIRE - PHQ9
SUM OF ALL RESPONSES TO PHQ QUESTIONS 1-9: 0
2. FEELING DOWN, DEPRESSED OR HOPELESS: NOT AT ALL
SUM OF ALL RESPONSES TO PHQ QUESTIONS 1-9: 0
1. LITTLE INTEREST OR PLEASURE IN DOING THINGS: NOT AT ALL
SUM OF ALL RESPONSES TO PHQ9 QUESTIONS 1 & 2: 0

## 2024-10-17 ASSESSMENT — ANXIETY QUESTIONNAIRES
3. WORRYING TOO MUCH ABOUT DIFFERENT THINGS: NOT AT ALL
1. FEELING NERVOUS, ANXIOUS, OR ON EDGE: NOT AT ALL
7. FEELING AFRAID AS IF SOMETHING AWFUL MIGHT HAPPEN: NOT AT ALL
4. TROUBLE RELAXING: NOT AT ALL
2. NOT BEING ABLE TO STOP OR CONTROL WORRYING: NOT AT ALL
6. BECOMING EASILY ANNOYED OR IRRITABLE: NOT AT ALL
5. BEING SO RESTLESS THAT IT IS HARD TO SIT STILL: NOT AT ALL
GAD7 TOTAL SCORE: 0

## 2024-10-17 NOTE — PROGRESS NOTES
Erik Gallegos presents today for   Chief Complaint   Patient presents with    Follow-up     6 month       Erik Gallegos preferred language for health care discussion is english/other.    Is someone accompanying this pt? no    Is the patient using any DME equipment during OV? no    Depression Screening:  Depression: Not at risk (10/17/2024)    PHQ-2     PHQ-2 Score: 0        Learning Assessment:  Who is the primary learner? Patient    What is the preferred language for health care of the primary learner? ENGLISH    How does the primary learner prefer to learn new concepts? DEMONSTRATION    Answered By patient    Relationship to Learner SELF           Pt currently taking Anticoagulant therapy? no    Pt currently taking Antiplatelet therapy ? Aspirin 81 mg daily      Coordination of Care:  1. Have you been to the ER, urgent care clinic since your last visit? Hospitalized since your last visit? no    2. Have you seen or consulted any other health care providers outside of the [facility] since your last visit? Include any pap smears or colon screening. no    
easily.   Psychiatric/Behavioral:  Negative for suicidal ideas.          /68 (Site: Left Upper Arm, Position: Sitting, Cuff Size: Medium Adult)   Pulse 61   Ht 1.88 m (6' 2\")   Wt 93.4 kg (206 lb)   SpO2 95%   BMI 26.45 kg/m²     Objective:   Physical Exam  Constitutional:       General: He is not in acute distress.  HENT:      Head: Normocephalic.   Neck:      Vascular: No carotid bruit or JVD.   Cardiovascular:      Rate and Rhythm: Normal rate and regular rhythm. No extrasystoles are present.     Heart sounds: No murmur heard.     No gallop.   Pulmonary:      Effort: Pulmonary effort is normal.      Breath sounds: No wheezing, rhonchi or rales.   Abdominal:      General: Bowel sounds are normal. There is no distension.      Palpations: Abdomen is soft.      Tenderness: There is no abdominal tenderness.   Musculoskeletal:         General: No swelling or deformity.   Skin:     General: Skin is warm and dry.      Findings: No rash.   Neurological:      General: No focal deficit present.      Mental Status: He is alert and oriented to person, place, and time.   Psychiatric:         Mood and Affect: Mood normal.         Behavior: Behavior normal.         EKG: Atrial paced rhythm, intrinsic QRS conduction with poor R wave progression, incomplete right bundle branch block, but no ST or T wave changes concerning for ischemia.  Normal QTc interval.  No change compared to the previous EKG.    Pacemaker interrogation: Medtronic dual-chamber MRI compatible device.  Battery at beginning of life with estimated life at 11.2 years.  Pacing in the atrium 99% in the ventricle 0%.  No concerning arrhythmias.  Stable activity level.  See scanned document for details.    Assessment / Plan:     Coronary artery disease.  Patient underwent PCI to his ostial mid LAD with a total of 2 drug-eluting stents in the setting of an acute non-ST elevation myocardial infarction in April 2021.  He then underwent a staged PCI to his 70%

## 2024-11-03 NOTE — PROCEDURES
RADIOLOGY POST PROCEDURE NOTE     July 23, 2024       9:53 AM     Preoperative Diagnosis:   thrombocytopenia    Postoperative Diagnosis:  Same.    :  Andrews Irving MD    Assistant:  None.    Type of Anesthesia: 1% plain lidocaine, moderate sedation    Procedure/Description:  Fluoroscopy guided bone marrow biopsy    Findings:   Approximately 7mL of marrow aspirate and single core obtained from left iliac bone.    Estimated blood Loss:  Minimal    Specimen Removed:  yes    Blood transfusions:  None.    Implants:  None.    Complications: None    Condition: Stable    Discharge Plan:  discharge home  after 1 hour bedrest.     ANDREWS IRVING MD              03-Nov-2024

## 2024-12-31 ENCOUNTER — HOSPITAL ENCOUNTER (OUTPATIENT)
Facility: HOSPITAL | Age: 84
Setting detail: SPECIMEN
Discharge: HOME | End: 2025-01-03
Payer: MEDICARE

## 2024-12-31 DIAGNOSIS — I10 ESSENTIAL (PRIMARY) HYPERTENSION: ICD-10-CM

## 2024-12-31 DIAGNOSIS — D69.6 THROMBOCYTOPENIA, UNSPECIFIED (HCC): ICD-10-CM

## 2024-12-31 DIAGNOSIS — N18.31 TYPE 2 DIABETES MELLITUS WITH STAGE 3A CHRONIC KIDNEY DISEASE, WITHOUT LONG-TERM CURRENT USE OF INSULIN (HCC): ICD-10-CM

## 2024-12-31 DIAGNOSIS — E78.2 MIXED HYPERLIPIDEMIA: ICD-10-CM

## 2024-12-31 DIAGNOSIS — E11.22 TYPE 2 DIABETES MELLITUS WITH STAGE 3A CHRONIC KIDNEY DISEASE, WITHOUT LONG-TERM CURRENT USE OF INSULIN (HCC): ICD-10-CM

## 2024-12-31 LAB
ALBUMIN SERPL-MCNC: 3.9 G/DL (ref 3.4–5)
ALBUMIN/GLOB SERPL: 1.3 (ref 0.8–1.7)
ALP SERPL-CCNC: 87 U/L (ref 45–117)
ALT SERPL-CCNC: 31 U/L (ref 16–61)
ANION GAP SERPL CALC-SCNC: 2 MMOL/L (ref 3–18)
AST SERPL-CCNC: 29 U/L (ref 10–38)
BASOPHILS # BLD: 0.1 K/UL (ref 0–0.1)
BASOPHILS NFR BLD: 1 % (ref 0–2)
BILIRUB SERPL-MCNC: 1.1 MG/DL (ref 0.2–1)
BUN SERPL-MCNC: 18 MG/DL (ref 7–18)
BUN/CREAT SERPL: 14 (ref 12–20)
CALCIUM SERPL-MCNC: 9.3 MG/DL (ref 8.5–10.1)
CHLORIDE SERPL-SCNC: 108 MMOL/L (ref 100–111)
CHOLEST SERPL-MCNC: 123 MG/DL
CO2 SERPL-SCNC: 29 MMOL/L (ref 21–32)
CREAT SERPL-MCNC: 1.25 MG/DL (ref 0.6–1.3)
DIFFERENTIAL METHOD BLD: ABNORMAL
EOSINOPHIL # BLD: 0 K/UL (ref 0–0.4)
EOSINOPHIL NFR BLD: 0 % (ref 0–5)
ERYTHROCYTE [DISTWIDTH] IN BLOOD BY AUTOMATED COUNT: 12.7 % (ref 11.6–14.5)
EST. AVERAGE GLUCOSE BLD GHB EST-MCNC: 137 MG/DL
GLOBULIN SER CALC-MCNC: 3.1 G/DL (ref 2–4)
GLUCOSE SERPL-MCNC: 111 MG/DL (ref 74–99)
HBA1C MFR BLD: 6.4 % (ref 4.2–5.6)
HCT VFR BLD AUTO: 43.3 % (ref 36–48)
HDLC SERPL-MCNC: 51 MG/DL (ref 40–60)
HDLC SERPL: 2.4 (ref 0–5)
HGB BLD-MCNC: 13.6 G/DL (ref 13–16)
IMM GRANULOCYTES # BLD AUTO: 0 K/UL
IMM GRANULOCYTES NFR BLD AUTO: 0 %
LDLC SERPL CALC-MCNC: 61 MG/DL (ref 0–100)
LIPID PANEL: NORMAL
LYMPHOCYTES # BLD: 1 K/UL (ref 0.9–3.6)
LYMPHOCYTES NFR BLD: 10 % (ref 21–52)
MCH RBC QN AUTO: 29.3 PG (ref 24–34)
MCHC RBC AUTO-ENTMCNC: 31.4 G/DL (ref 31–37)
MCV RBC AUTO: 93.3 FL (ref 78–100)
MONOCYTES # BLD: 3.3 K/UL (ref 0.05–1.2)
MONOCYTES NFR BLD: 35 % (ref 3–10)
NEUTS SEG # BLD: 5.1 K/UL (ref 1.8–8)
NEUTS SEG NFR BLD: 54 % (ref 40–73)
NRBC # BLD: 0 K/UL (ref 0–0.01)
NRBC BLD-RTO: 0 PER 100 WBC
PLATELET # BLD AUTO: 93 K/UL (ref 135–420)
PLATELET COMMENT: ABNORMAL
PMV BLD AUTO: 12.2 FL (ref 9.2–11.8)
POTASSIUM SERPL-SCNC: 4.7 MMOL/L (ref 3.5–5.5)
PROT SERPL-MCNC: 7 G/DL (ref 6.4–8.2)
RBC # BLD AUTO: 4.64 M/UL (ref 4.35–5.65)
RBC MORPH BLD: ABNORMAL
RBC MORPH BLD: ABNORMAL
SODIUM SERPL-SCNC: 139 MMOL/L (ref 136–145)
TRIGL SERPL-MCNC: 55 MG/DL
VLDLC SERPL CALC-MCNC: 11 MG/DL
WBC # BLD AUTO: 9.5 K/UL (ref 4.6–13.2)

## 2024-12-31 PROCEDURE — 80061 LIPID PANEL: CPT

## 2024-12-31 PROCEDURE — 80053 COMPREHEN METABOLIC PANEL: CPT

## 2024-12-31 PROCEDURE — 83036 HEMOGLOBIN GLYCOSYLATED A1C: CPT

## 2024-12-31 PROCEDURE — 36415 COLL VENOUS BLD VENIPUNCTURE: CPT

## 2024-12-31 PROCEDURE — 85025 COMPLETE CBC W/AUTO DIFF WBC: CPT

## 2025-01-04 SDOH — HEALTH STABILITY: PHYSICAL HEALTH: ON AVERAGE, HOW MANY DAYS PER WEEK DO YOU ENGAGE IN MODERATE TO STRENUOUS EXERCISE (LIKE A BRISK WALK)?: 3 DAYS

## 2025-01-04 SDOH — HEALTH STABILITY: PHYSICAL HEALTH: ON AVERAGE, HOW MANY MINUTES DO YOU ENGAGE IN EXERCISE AT THIS LEVEL?: 30 MIN

## 2025-01-04 ASSESSMENT — PATIENT HEALTH QUESTIONNAIRE - PHQ9
SUM OF ALL RESPONSES TO PHQ QUESTIONS 1-9: 0
2. FEELING DOWN, DEPRESSED OR HOPELESS: NOT AT ALL
SUM OF ALL RESPONSES TO PHQ QUESTIONS 1-9: 0
SUM OF ALL RESPONSES TO PHQ9 QUESTIONS 1 & 2: 0
1. LITTLE INTEREST OR PLEASURE IN DOING THINGS: NOT AT ALL

## 2025-01-04 ASSESSMENT — LIFESTYLE VARIABLES
HOW OFTEN DO YOU HAVE SIX OR MORE DRINKS ON ONE OCCASION: 1
HOW MANY STANDARD DRINKS CONTAINING ALCOHOL DO YOU HAVE ON A TYPICAL DAY: PATIENT DOES NOT DRINK
HOW MANY STANDARD DRINKS CONTAINING ALCOHOL DO YOU HAVE ON A TYPICAL DAY: 0
HOW OFTEN DO YOU HAVE A DRINK CONTAINING ALCOHOL: 1
HOW OFTEN DO YOU HAVE A DRINK CONTAINING ALCOHOL: NEVER

## 2025-01-07 ENCOUNTER — OFFICE VISIT (OUTPATIENT)
Facility: CLINIC | Age: 85
End: 2025-01-07

## 2025-01-07 VITALS
TEMPERATURE: 98.2 F | SYSTOLIC BLOOD PRESSURE: 117 MMHG | RESPIRATION RATE: 20 BRPM | BODY MASS INDEX: 27.34 KG/M2 | HEART RATE: 74 BPM | WEIGHT: 213 LBS | HEIGHT: 74 IN | DIASTOLIC BLOOD PRESSURE: 67 MMHG | OXYGEN SATURATION: 98 %

## 2025-01-07 DIAGNOSIS — I72.3 BILATERAL ANEURYSM OF ILIAC ARTERY (HCC): ICD-10-CM

## 2025-01-07 DIAGNOSIS — Z00.00 MEDICARE ANNUAL WELLNESS VISIT, SUBSEQUENT: Primary | ICD-10-CM

## 2025-01-07 DIAGNOSIS — D69.6 THROMBOCYTOPENIA, UNSPECIFIED (HCC): ICD-10-CM

## 2025-01-07 DIAGNOSIS — I25.118 ATHEROSCLEROTIC HEART DISEASE OF NATIVE CORONARY ARTERY WITH OTHER FORMS OF ANGINA PECTORIS (HCC): ICD-10-CM

## 2025-01-07 DIAGNOSIS — E78.2 MIXED HYPERLIPIDEMIA: ICD-10-CM

## 2025-01-07 DIAGNOSIS — N18.31 TYPE 2 DIABETES MELLITUS WITH STAGE 3A CHRONIC KIDNEY DISEASE, WITHOUT LONG-TERM CURRENT USE OF INSULIN (HCC): ICD-10-CM

## 2025-01-07 DIAGNOSIS — I10 ESSENTIAL (PRIMARY) HYPERTENSION: ICD-10-CM

## 2025-01-07 DIAGNOSIS — I49.5 SICK SINUS SYNDROME (HCC): ICD-10-CM

## 2025-01-07 DIAGNOSIS — E11.22 TYPE 2 DIABETES MELLITUS WITH STAGE 3A CHRONIC KIDNEY DISEASE, WITHOUT LONG-TERM CURRENT USE OF INSULIN (HCC): ICD-10-CM

## 2025-01-07 NOTE — PROGRESS NOTES
Erik Gallegos presents today for   Chief Complaint   Patient presents with    Diabetes    Hypertension    Cholesterol Problem     4 month follow up    Medicare AWV           \"Have you been to the ER, urgent care clinic since your last visit?  Hospitalized since your last visit?\"    NO    “Have you seen or consulted any other health care providers outside of [facility] since your last visit?”    NO

## 2025-01-07 NOTE — PROGRESS NOTES
Medicare Annual Wellness Visit    Erik Gallegos is here for Diabetes, Hypertension, Cholesterol Problem (4 month follow up), and Medicare AWV    Assessment & Plan   Medicare annual wellness visit, subsequent  Type 2 diabetes mellitus with stage 3a chronic kidney disease, without long-term current use of insulin (HCC)  -     CBC with Auto Differential; Future  -     Hemoglobin A1C; Future  -     Albumin/Creatinine Ratio, Urine; Future  Mixed hyperlipidemia  -     Lipid Panel; Future  Essential (primary) hypertension  -     Comprehensive Metabolic Panel; Future  Sick sinus syndrome (HCC)  Bilateral aneurysm of iliac artery (HCC)  Thrombocytopenia, unspecified (HCC)  Atherosclerotic heart disease of native coronary artery with other forms of angina pectoris (HCC)     Return in about 6 months (around 7/7/2025) for labs 1 week before.     Subjective       Patient's complete Health Risk Assessment and screening values have been reviewed and are found in Flowsheets. The following problems were reviewed today and where indicated follow up appointments were made and/or referrals ordered.    Positive Risk Factor Screenings with Interventions:                    Safety:  Do you have any tripping hazards - loose or unsecured carpets or rugs?: (!) Yes  Interventions:  Patient declined any further interventions or treatment                   Objective   Vitals:    01/07/25 0923   BP: 117/67   Site: Left Upper Arm   Position: Sitting   Cuff Size: Large Adult   Pulse: 74   Resp: 20   Temp: 98.2 °F (36.8 °C)   TempSrc: Temporal   SpO2: 98%   Weight: 96.6 kg (213 lb)   Height: 1.88 m (6' 2\")      Body mass index is 27.35 kg/m².                    Allergies   Allergen Reactions    Shellfish Allergy Other (See Comments) and Swelling     Other reaction(s): cardiac dysrhythmia    Iodine Swelling and Other (See Comments)    Shellfish-Derived Products      Prior to Visit Medications    Medication Sig Taking? Authorizing Provider   Omega-3

## 2025-01-07 NOTE — PROGRESS NOTES
Erik Gallegos is a 84 y.o.  male and presents with Diabetes, Hypertension, Cholesterol Problem (F/u), and Coronary Artery Disease      SUBJECTIVE:  Pt's HTN and Afib are well controlled on Toprol XL 50 mg.  Patient had a non-ST elevation MI and underwent cardiac catheterization with placement of stent in April/May 2021.  Patient was placed on dual antiplatelet therapy until April 2022.  He is now just on aspirin 81 mg daily as well as Lipitor 20 mg daily for his dyslipidemia.    .  Patient will follow up with cardiology for further management.  Patient also status post pacemaker for sick sinus syndrome.  Patient is followed by urology for history of prostate cancer.  He had external beam radiation in the past.  Patient  [facility]  [facility]  He is trying to control this currently with diet by cutting back starches and sweets and increasing  exercise.  Therefore no medication is needed at this time.  He does have chronic kidney disease stage IIIa that has remained fairly stable.  Patient may be a good candidate for SGLT2 in the future.    Pt has h/o Prostate cancer for which he had XRT 3/9/2016 and is now followed by Urology yearly.    Patient has thrombocytopenia that has been persistent for the last few years.  It appears this to have started in 2021 when he had his MI and started antiplatelet therapy.  He is now being managed by hematology after having a bone marrow biopsy which did not show any pathology to explain his low platelets at this time.  He did have a recent CT of his abdomen which showed some mild dilation of his iliac arteries so we will go ahead and refer him to vascular surgery for further recommendations.  He does have a history of venous insufficiency and some asymmetry in his lower extremity edema.    Respiratory ROS: negative for - shortness of breath  Cardiovascular ROS: negative for - chest pain    Current Outpatient Medications   Medication Sig

## 2025-01-22 ENCOUNTER — OFFICE VISIT (OUTPATIENT)
Age: 85
End: 2025-01-22
Payer: MEDICARE

## 2025-01-22 ENCOUNTER — NURSE ONLY (OUTPATIENT)
Age: 85
End: 2025-01-22

## 2025-01-22 VITALS
SYSTOLIC BLOOD PRESSURE: 122 MMHG | OXYGEN SATURATION: 100 % | DIASTOLIC BLOOD PRESSURE: 74 MMHG | WEIGHT: 213 LBS | HEART RATE: 78 BPM | BODY MASS INDEX: 27.34 KG/M2 | RESPIRATION RATE: 18 BRPM | HEIGHT: 74 IN

## 2025-01-22 DIAGNOSIS — I72.3 ANEURYSM OF RIGHT COMMON ILIAC ARTERY (HCC): ICD-10-CM

## 2025-01-22 DIAGNOSIS — I72.3 ANEURYSM OF RIGHT INTERNAL ILIAC ARTERY (HCC): Primary | ICD-10-CM

## 2025-01-22 DIAGNOSIS — I72.3 ANEURYSM OF LEFT COMMON ILIAC ARTERY (HCC): ICD-10-CM

## 2025-01-22 DIAGNOSIS — I49.5 SICK SINUS SYNDROME (HCC): ICD-10-CM

## 2025-01-22 DIAGNOSIS — Z95.0 PRESENCE OF CARDIAC PACEMAKER: Primary | ICD-10-CM

## 2025-01-22 PROCEDURE — 3074F SYST BP LT 130 MM HG: CPT | Performed by: SURGERY

## 2025-01-22 PROCEDURE — 1159F MED LIST DOCD IN RCRD: CPT | Performed by: SURGERY

## 2025-01-22 PROCEDURE — 1123F ACP DISCUSS/DSCN MKR DOCD: CPT | Performed by: SURGERY

## 2025-01-22 PROCEDURE — 3078F DIAST BP <80 MM HG: CPT | Performed by: SURGERY

## 2025-01-22 PROCEDURE — G8427 DOCREV CUR MEDS BY ELIG CLIN: HCPCS | Performed by: SURGERY

## 2025-01-22 PROCEDURE — 99213 OFFICE O/P EST LOW 20 MIN: CPT | Performed by: SURGERY

## 2025-01-22 PROCEDURE — 1036F TOBACCO NON-USER: CPT | Performed by: SURGERY

## 2025-01-22 PROCEDURE — G8419 CALC BMI OUT NRM PARAM NOF/U: HCPCS | Performed by: SURGERY

## 2025-01-22 NOTE — PROGRESS NOTES
[facility]    Vascular Surgery Office Visit    Erik FLOR Young  Chief Complaint   Patient presents with    Follow-up     After studies       History:  84 y.o. male former smoker, with a hx of CAD and PCI, CKD IIIa, and prostate CA, returns for follow up of incidentally discovered bilateral common iliac artery aneurysms, and dilation of the R IIA. At his last visit, 4 mos ago, I recommended a surveillance aortoiliac duplex 6 months from his previous study. He returns today. No sx of abdominal or back pain. No claudication or rest pain. He is a former smoker.       Physical Exam:    /74 (Site: Right Upper Arm, Position: Sitting, Cuff Size: Medium Adult)   Pulse 78   Resp 18   Ht 1.88 m (6' 2\")   Wt 96.6 kg (213 lb)   SpO2 100%   BMI 27.35 kg/m²      Constitutional:  Patient is well developed, well nourished, and not distressed.   HEENT: Atraumatic, normocephalic. No carotid bruits appreciated.  Cardiovascular:  Normal rate, regular rhythm, normal heart sounds.  Pulmonary/Chest: Effort normal and breath sounds normal.    Abdominal:   Soft, non-distended. No tenderness to palpation.   Extremities: BLE warm  Neurological:  he  is alert and oriented x3. Motor & sensory grossly intact in all 4 limbs.       Imaging/Studies:   Jan 2025   AAA duplex: No aortic aneurysm. R YOLANDA 2.4 x 2.3. L YOLANDA 2cm. R internal iliac/hypogastric artery 1.5cm.     June 2024  CT a/p without contrast: R YOLANDA 2.3 cm. L YOLANDA 2.1cm. 1.4cm R IIA aneurysm. L IIA 1.1cm.  Radiologist reported a slight increase in size of aneurysms compared to a study in 2015.     Aug 2024 Cr 1.28 with an EGFR of 55      Impression:  Essentially stable bilateral 2cm common iliac artery aneurysms.   Stable R internal iliac aneurysm of 1.5 cm      Plan:  RTC with a repeat aortoiliac duplex 6 months from the previous study (ordered for July 2025.  There are limited guidelines regarding internal iliac artery aneurysm management. Depending

## 2025-01-31 NOTE — RESULT ENCOUNTER NOTE
Device check personally reviewed by me.  Normal device function on interrogation.  No significant arrhythmias.  See scanned interrogation document for complete details.

## 2025-04-07 NOTE — PROGRESS NOTES
Agree   Cath holding summary     Patient escorted to cath holding from waiting area ambulatory, alert and oriented x 4, voicing no complaints. Changed into gown and placed on monitor. NPO since MN. Lab results, med rec and H&P reviewed on chart. PIV x 1 inserted without difficulty.

## 2025-04-08 ENCOUNTER — CLINICAL SUPPORT (OUTPATIENT)
Age: 85
End: 2025-04-08

## 2025-04-08 DIAGNOSIS — Z95.0 PRESENCE OF CARDIAC PACEMAKER: Primary | ICD-10-CM

## 2025-04-08 DIAGNOSIS — I49.5 SICK SINUS SYNDROME (HCC): ICD-10-CM

## 2025-04-21 RX ORDER — METOPROLOL SUCCINATE 50 MG/1
50 TABLET, EXTENDED RELEASE ORAL DAILY
Qty: 90 TABLET | Refills: 3 | Status: SHIPPED | OUTPATIENT
Start: 2025-04-21

## 2025-04-24 ENCOUNTER — OFFICE VISIT (OUTPATIENT)
Age: 85
End: 2025-04-24
Payer: MEDICARE

## 2025-04-24 VITALS
HEIGHT: 74 IN | OXYGEN SATURATION: 100 % | WEIGHT: 214 LBS | HEART RATE: 62 BPM | SYSTOLIC BLOOD PRESSURE: 130 MMHG | DIASTOLIC BLOOD PRESSURE: 80 MMHG | BODY MASS INDEX: 27.46 KG/M2

## 2025-04-24 DIAGNOSIS — I25.10 ATHEROSCLEROSIS OF NATIVE CORONARY ARTERY OF NATIVE HEART WITHOUT ANGINA PECTORIS: ICD-10-CM

## 2025-04-24 DIAGNOSIS — Z95.0 PRESENCE OF CARDIAC PACEMAKER: ICD-10-CM

## 2025-04-24 DIAGNOSIS — I10 ESSENTIAL (PRIMARY) HYPERTENSION: ICD-10-CM

## 2025-04-24 DIAGNOSIS — I25.10 CORONARY ARTERY DISEASE INVOLVING NATIVE CORONARY ARTERY OF NATIVE HEART WITHOUT ANGINA PECTORIS: Primary | ICD-10-CM

## 2025-04-24 DIAGNOSIS — E11.9 TYPE 2 DIABETES MELLITUS WITHOUT COMPLICATION, UNSPECIFIED WHETHER LONG TERM INSULIN USE (HCC): ICD-10-CM

## 2025-04-24 DIAGNOSIS — I49.5 SICK SINUS SYNDROME (HCC): ICD-10-CM

## 2025-04-24 PROCEDURE — 1159F MED LIST DOCD IN RCRD: CPT | Performed by: INTERNAL MEDICINE

## 2025-04-24 PROCEDURE — 1036F TOBACCO NON-USER: CPT | Performed by: INTERNAL MEDICINE

## 2025-04-24 PROCEDURE — 1123F ACP DISCUSS/DSCN MKR DOCD: CPT | Performed by: INTERNAL MEDICINE

## 2025-04-24 PROCEDURE — 1126F AMNT PAIN NOTED NONE PRSNT: CPT | Performed by: INTERNAL MEDICINE

## 2025-04-24 PROCEDURE — 1160F RVW MEDS BY RX/DR IN RCRD: CPT | Performed by: INTERNAL MEDICINE

## 2025-04-24 PROCEDURE — 93000 ELECTROCARDIOGRAM COMPLETE: CPT | Performed by: INTERNAL MEDICINE

## 2025-04-24 PROCEDURE — 3075F SYST BP GE 130 - 139MM HG: CPT | Performed by: INTERNAL MEDICINE

## 2025-04-24 PROCEDURE — G8419 CALC BMI OUT NRM PARAM NOF/U: HCPCS | Performed by: INTERNAL MEDICINE

## 2025-04-24 PROCEDURE — 99214 OFFICE O/P EST MOD 30 MIN: CPT | Performed by: INTERNAL MEDICINE

## 2025-04-24 PROCEDURE — 3079F DIAST BP 80-89 MM HG: CPT | Performed by: INTERNAL MEDICINE

## 2025-04-24 PROCEDURE — G8427 DOCREV CUR MEDS BY ELIG CLIN: HCPCS | Performed by: INTERNAL MEDICINE

## 2025-04-24 ASSESSMENT — ENCOUNTER SYMPTOMS
NAUSEA: 0
COUGH: 0
SORE THROAT: 0
ABDOMINAL DISTENTION: 0
SHORTNESS OF BREATH: 0
ABDOMINAL PAIN: 0
VOMITING: 0

## 2025-04-24 NOTE — PROGRESS NOTES
Erik Gallegos presents today for   Chief Complaint   Patient presents with    Follow-up     6 month       Erik Gallegos preferred language for health care discussion is english/other.    Is someone accompanying this pt? no    Is the patient using any DME equipment during OV? no    Depression Screening:  Depression: Not at risk (1/4/2025)    PHQ-2     PHQ-2 Score: 0        Learning Assessment:  No question data found.       Pt currently taking Anticoagulant therapy? no    Pt currently taking Antiplatelet therapy ? Aspirin 81 mg daily      Coordination of Care:  1. Have you been to the ER, urgent care clinic since your last visit? Hospitalized since your last visit? no    2. Have you seen or consulted any other health care providers outside of the [facility] since your last visit? Include any pap smears or colon screening. no

## 2025-04-24 NOTE — PROGRESS NOTES
04/24/25     Erik Gallegos  is a 84 y.o. male     Chief Complaint   Patient presents with    Follow-up     6 month       HPI    Patient presents for a follow-up office visit. He has a past medical history significant for paroxysmal atrial fibrillation, hypertension, and sick sinus syndrome, status post dual-chamber permanent pacemaker in March 2011.  More recently, he was diagnosed with coronary disease in 2021.    He underwent an exercise nuclear stress test in February 2019 which was a normal low risk study.  He exercised 8 minutes on the treadmill using the Stephen protocol without any EKG changes or symptoms.  No ischemia.  EF 72%.    The patient was hospitalized in April 2021 with a non-ST elevation myocardial infarction.  He presented with acute onset chest pain was found to have an elevated troponin which peaked at 1.45.  He subsequently underwent a cardiac catheterization which demonstrated severe two-vessel coronary artery disease.  His culprit lesion was a 99% ostial LAD which underwent angioplasty and stenting with a single drug-eluting stent.  He is also found to have 85% mid LAD stenosis which underwent stenting with a second drug-eluting stent.  He had a residual distal left circumflex 70% lesion as well with plan for staged intervention in the future.  An echocardiogram during his hospital stay demonstrated preserved LVEF of 55 to 60% with grade 1 diastolic dysfunction, no valvular heart disease and normal PA pressures.    Patient subsequently underwent a staged PCI to his distal left circumflex with a another single drug-eluting stent in May 2021 using a Xience 3.0 x 12 mm stent.  He stopped his Brilinta as instructed at the end of May 2022.    He underwent a pacemaker generator exchange in October 2022.    The patient last underwent a pharmacologic nuclear stress test in May 2023 which is a low risk test.  No evidence of ischemia.  EF 72%.     He was last seen in our office 6 months ago.  Since last

## 2025-05-29 ENCOUNTER — RESULTS FOLLOW-UP (OUTPATIENT)
Age: 85
End: 2025-05-29

## 2025-06-05 RX ORDER — ATORVASTATIN CALCIUM 20 MG/1
20 TABLET, FILM COATED ORAL NIGHTLY
Qty: 90 TABLET | Refills: 3 | Status: SHIPPED | OUTPATIENT
Start: 2025-06-05

## 2025-06-09 ENCOUNTER — HOSPITAL ENCOUNTER (EMERGENCY)
Age: 85
Discharge: HOME | End: 2025-06-09
Attending: STUDENT IN AN ORGANIZED HEALTH CARE EDUCATION/TRAINING PROGRAM
Payer: MEDICARE

## 2025-06-09 ENCOUNTER — APPOINTMENT (OUTPATIENT)
Age: 85
End: 2025-06-09
Payer: MEDICARE

## 2025-06-09 VITALS
DIASTOLIC BLOOD PRESSURE: 89 MMHG | BODY MASS INDEX: 27.59 KG/M2 | HEART RATE: 71 BPM | HEIGHT: 74 IN | RESPIRATION RATE: 18 BRPM | SYSTOLIC BLOOD PRESSURE: 114 MMHG | TEMPERATURE: 98.3 F | WEIGHT: 215 LBS | OXYGEN SATURATION: 99 %

## 2025-06-09 DIAGNOSIS — K59.00 CONSTIPATION, UNSPECIFIED CONSTIPATION TYPE: Primary | ICD-10-CM

## 2025-06-09 DIAGNOSIS — M54.50 ACUTE BILATERAL LOW BACK PAIN WITHOUT SCIATICA: ICD-10-CM

## 2025-06-09 PROCEDURE — 74176 CT ABD & PELVIS W/O CONTRAST: CPT

## 2025-06-09 PROCEDURE — 99284 EMERGENCY DEPT VISIT MOD MDM: CPT

## 2025-06-09 RX ORDER — POLYETHYLENE GLYCOL 3350 17 G/17G
17 POWDER, FOR SOLUTION ORAL 2 TIMES DAILY PRN
Qty: 510 G | Refills: 0 | Status: SHIPPED | OUTPATIENT
Start: 2025-06-09 | End: 2025-07-09

## 2025-06-09 RX ORDER — BISACODYL 10 MG
10 SUPPOSITORY, RECTAL RECTAL DAILY PRN
Qty: 5 SUPPOSITORY | Refills: 0 | Status: SHIPPED | OUTPATIENT
Start: 2025-06-09

## 2025-06-09 RX ORDER — DOCUSATE SODIUM 100 MG/1
100 CAPSULE, LIQUID FILLED ORAL 2 TIMES DAILY
Qty: 60 CAPSULE | Refills: 0 | Status: SHIPPED | OUTPATIENT
Start: 2025-06-09

## 2025-06-09 ASSESSMENT — LIFESTYLE VARIABLES
HOW OFTEN DO YOU HAVE A DRINK CONTAINING ALCOHOL: NEVER
HOW MANY STANDARD DRINKS CONTAINING ALCOHOL DO YOU HAVE ON A TYPICAL DAY: PATIENT DOES NOT DRINK

## 2025-06-09 ASSESSMENT — PAIN DESCRIPTION - LOCATION: LOCATION: FLANK

## 2025-06-09 ASSESSMENT — PAIN SCALES - GENERAL: PAINLEVEL_OUTOF10: 4

## 2025-06-09 ASSESSMENT — PAIN - FUNCTIONAL ASSESSMENT: PAIN_FUNCTIONAL_ASSESSMENT: 0-10

## 2025-06-09 ASSESSMENT — PAIN DESCRIPTION - ORIENTATION: ORIENTATION: RIGHT

## 2025-06-09 NOTE — ED PROVIDER NOTES
[facility] EMERGENCY DEPARTMENT  EMERGENCY DEPARTMENT ENCOUNTER      Pt Name: Erik Gallegos  MRN: [id number]  Birthdate 1940  Date of evaluation: 6/9/2025  Provider: Radha Figueredo MD    CHIEF COMPLAINT       Chief Complaint   Patient presents with    Constipation         HISTORY OF PRESENT ILLNESS   (Location/Symptom, Timing/Onset, Context/Setting, Quality, Duration, Modifying Factors, Severity)  Note limiting factors.   Erik Gallegos is a 84 y.o. male who presents to the emergency department for what he describes as constipation.  States he has not had a bowel movement in 5 days.  States he is still passing minimal gas but less than normal.  Has had some mild nausea but denies any vomiting.  He states that 2 days ago he started having a severe midline back pain.  Denies anything radiating down his legs.  Denies any trouble holding onto his urine or stool.  Denies any tingling or numbness.  He also states that he has had a chronic pain in his right lower quadrant for about a month.  Pretty consistent.  States he does know that his diet has been poor over the last several days.  Has attempted some medications at home but nothing has been successful.  Denies any pain in his rectum.     Nursing Notes were reviewed.    REVIEW OF SYSTEMS    (2-9 systems for level 4, 10 or more for level 5)     Constitutional: No fever  HENT: No ear pain  Eyes: No change in vision  Respiratory: No SOB  Cardio: No chest pain  GI: No blood in stool  : No hematuria  MSK: No back pain  Skin: No rashes  Neuro: No headache    Except as noted above the remainder of the review of systems was reviewed and negative.       PAST MEDICAL HISTORY     Past Medical History:   Diagnosis Date    Abnormal nuclear cardiac imaging test 11/22/2010    Sm area of apical ischemia and inferolateral scar, both possibly apical thinning.  No WMA.  EF 60%.      Atrial fibrillation (HCC)     CHADS 0  (-CHF, -HTN, -AGE, -DM, -CVA)    History  No

## 2025-06-09 NOTE — ED TRIAGE NOTES
Patient presents to ER ambulatory with c/o not having a bowel movement since last Wednesday and right flank pain.

## 2025-06-09 NOTE — DISCHARGE INSTRUCTIONS
Increase fiber and water in your diet.  Would recommend taking Colace daily indefinitely.  Use MiraLAX twice daily until you are feeling better.  Use suppositories as needed.    Recommend taking extra strength Tylenol every 4-6 hours and ibuprofen 600 mg every 6 hours as needed for pain and discomfort.

## 2025-06-11 ENCOUNTER — RESULTS FOLLOW-UP (OUTPATIENT)
Age: 85
End: 2025-06-11

## 2025-06-17 NOTE — TELEPHONE ENCOUNTER
----- Message from Dr. Easton Bajwa MD sent at 6/12/2025  8:24 AM EDT -----  Please let the patient know that his nuclear stress test was mildly abnormal, but given his lack of symptoms, I would recommend continuing medical therapy.  If he does develop new exertional symptoms such as chest pain or shortness of breath he should let us know.  ----- Message -----  From: Dariana Roman MA  Sent: 6/11/2025   4:45 PM EDT  To: Easton Bajwa MD    Per your last note \"  Coronary artery disease.  Patient underwent PCI to his ostial mid LAD with a total of 2 drug-eluting stents in the setting of an acute non-ST elevation myocardial infarction in April 2021.  He then underwent a staged PCI to his 70% distal left circumflex stenosis with a single drug-eluting stent in May 2021.  He last underwent a follow-up nuclear stress test in May 2023 which was a normal low risk study.  He remains on an aspirin, beta-blocker and potent statin.  I have recommended repeating a nuclear stress test next month for further ischemic evaluation.

## 2025-06-17 NOTE — TELEPHONE ENCOUNTER
Verbal order and read back per Easton Bajwa MD  Please let the patient know that his nuclear stress test was mildly abnormal, but given his lack of symptoms, I would recommend continuing medical therapy.  If he does develop new exertional symptoms such as chest pain or shortness of breath he should let us know.       - called patient and explained the results. Confirmed that the patient has not been having any SOB / Chest Pain.

## 2025-07-01 ENCOUNTER — HOSPITAL ENCOUNTER (OUTPATIENT)
Facility: HOSPITAL | Age: 85
Setting detail: SPECIMEN
Discharge: HOME | End: 2025-07-04
Payer: MEDICARE

## 2025-07-01 DIAGNOSIS — E11.22 TYPE 2 DIABETES MELLITUS WITH STAGE 3A CHRONIC KIDNEY DISEASE, WITHOUT LONG-TERM CURRENT USE OF INSULIN (HCC): ICD-10-CM

## 2025-07-01 DIAGNOSIS — I10 ESSENTIAL (PRIMARY) HYPERTENSION: ICD-10-CM

## 2025-07-01 DIAGNOSIS — E78.2 MIXED HYPERLIPIDEMIA: ICD-10-CM

## 2025-07-01 DIAGNOSIS — N18.31 TYPE 2 DIABETES MELLITUS WITH STAGE 3A CHRONIC KIDNEY DISEASE, WITHOUT LONG-TERM CURRENT USE OF INSULIN (HCC): ICD-10-CM

## 2025-07-01 LAB
ALBUMIN SERPL-MCNC: 4 G/DL (ref 3.4–5)
ALBUMIN/GLOB SERPL: 1.5 (ref 0.8–1.7)
ALP SERPL-CCNC: 83 U/L (ref 45–117)
ALT SERPL-CCNC: 24 U/L (ref 10–50)
ANION GAP SERPL CALC-SCNC: 11 MMOL/L (ref 3–18)
AST SERPL-CCNC: 28 U/L (ref 10–38)
BASOPHILS # BLD: 0.02 K/UL (ref 0–0.1)
BASOPHILS NFR BLD: 0.2 % (ref 0–2)
BILIRUB SERPL-MCNC: 1 MG/DL (ref 0.2–1)
BUN SERPL-MCNC: 15 MG/DL (ref 6–23)
BUN/CREAT SERPL: 13 (ref 12–20)
CALCIUM SERPL-MCNC: 9.6 MG/DL (ref 8.5–10.1)
CHLORIDE SERPL-SCNC: 109 MMOL/L (ref 98–107)
CHOLEST SERPL-MCNC: 102 MG/DL
CO2 SERPL-SCNC: 24 MMOL/L (ref 21–32)
CREAT SERPL-MCNC: 1.19 MG/DL (ref 0.6–1.3)
DIFFERENTIAL METHOD BLD: ABNORMAL
EOSINOPHIL # BLD: 0.02 K/UL (ref 0–0.4)
EOSINOPHIL NFR BLD: 0.2 % (ref 0–5)
ERYTHROCYTE [DISTWIDTH] IN BLOOD BY AUTOMATED COUNT: 12.8 % (ref 11.6–14.5)
EST. AVERAGE GLUCOSE BLD GHB EST-MCNC: 143 MG/DL
GLOBULIN SER CALC-MCNC: 2.6 G/DL (ref 2–4)
GLUCOSE SERPL-MCNC: 116 MG/DL (ref 74–108)
HBA1C MFR BLD: 6.6 % (ref 4.2–5.6)
HCT VFR BLD AUTO: 41.3 % (ref 36–48)
HDLC SERPL-MCNC: 41 MG/DL (ref 40–60)
HDLC SERPL: 2.5 (ref 0–5)
HGB BLD-MCNC: 12.8 G/DL (ref 13–16)
IMM GRANULOCYTES # BLD AUTO: 0.03 K/UL (ref 0–0.04)
IMM GRANULOCYTES NFR BLD AUTO: 0.3 % (ref 0–0.5)
LDLC SERPL CALC-MCNC: 52 MG/DL (ref 0–100)
LYMPHOCYTES # BLD: 0.79 K/UL (ref 0.9–3.6)
LYMPHOCYTES NFR BLD: 7.9 % (ref 21–52)
MCH RBC QN AUTO: 28.6 PG (ref 24–34)
MCHC RBC AUTO-ENTMCNC: 31 G/DL (ref 31–37)
MCV RBC AUTO: 92.4 FL (ref 78–100)
MONOCYTES # BLD: 4.36 K/UL (ref 0.05–1.2)
MONOCYTES NFR BLD: 43.6 % (ref 3–10)
NEUTS SEG # BLD: 4.79 K/UL (ref 1.8–8)
NEUTS SEG NFR BLD: 47.8 % (ref 40–73)
NRBC # BLD: 0 K/UL (ref 0–0.01)
NRBC BLD-RTO: 0 PER 100 WBC
PLATELET # BLD AUTO: 88 K/UL (ref 135–420)
PMV BLD AUTO: 12.1 FL (ref 9.2–11.8)
POTASSIUM SERPL-SCNC: 4.5 MMOL/L (ref 3.5–5.5)
PROT SERPL-MCNC: 6.6 G/DL (ref 6.4–8.2)
RBC # BLD AUTO: 4.47 M/UL (ref 4.35–5.65)
SODIUM SERPL-SCNC: 143 MMOL/L (ref 136–145)
TRIGL SERPL-MCNC: 45 MG/DL (ref 0–150)
VLDLC SERPL CALC-MCNC: 9 MG/DL
WBC # BLD AUTO: 10 K/UL (ref 4.6–13.2)

## 2025-07-01 PROCEDURE — 80061 LIPID PANEL: CPT

## 2025-07-01 PROCEDURE — 36415 COLL VENOUS BLD VENIPUNCTURE: CPT

## 2025-07-01 PROCEDURE — 85025 COMPLETE CBC W/AUTO DIFF WBC: CPT

## 2025-07-01 PROCEDURE — 80053 COMPREHEN METABOLIC PANEL: CPT

## 2025-07-01 PROCEDURE — 83036 HEMOGLOBIN GLYCOSYLATED A1C: CPT

## 2025-07-06 SDOH — ECONOMIC STABILITY: FOOD INSECURITY: WITHIN THE PAST 12 MONTHS, YOU WORRIED THAT YOUR FOOD WOULD RUN OUT BEFORE YOU GOT MONEY TO BUY MORE.: NEVER TRUE

## 2025-07-06 SDOH — ECONOMIC STABILITY: TRANSPORTATION INSECURITY
IN THE PAST 12 MONTHS, HAS THE LACK OF TRANSPORTATION KEPT YOU FROM MEDICAL APPOINTMENTS OR FROM GETTING MEDICATIONS?: NO

## 2025-07-06 SDOH — ECONOMIC STABILITY: FOOD INSECURITY: WITHIN THE PAST 12 MONTHS, THE FOOD YOU BOUGHT JUST DIDN'T LAST AND YOU DIDN'T HAVE MONEY TO GET MORE.: NEVER TRUE

## 2025-07-06 SDOH — ECONOMIC STABILITY: INCOME INSECURITY: IN THE LAST 12 MONTHS, WAS THERE A TIME WHEN YOU WERE NOT ABLE TO PAY THE MORTGAGE OR RENT ON TIME?: NO

## 2025-07-06 SDOH — ECONOMIC STABILITY: TRANSPORTATION INSECURITY
IN THE PAST 12 MONTHS, HAS LACK OF TRANSPORTATION KEPT YOU FROM MEETINGS, WORK, OR FROM GETTING THINGS NEEDED FOR DAILY LIVING?: NO

## 2025-07-08 ENCOUNTER — OFFICE VISIT (OUTPATIENT)
Facility: CLINIC | Age: 85
End: 2025-07-08

## 2025-07-08 ENCOUNTER — HOSPITAL ENCOUNTER (OUTPATIENT)
Facility: HOSPITAL | Age: 85
Setting detail: SPECIMEN
Discharge: HOME | End: 2025-07-11
Payer: MEDICARE

## 2025-07-08 VITALS
RESPIRATION RATE: 20 BRPM | HEIGHT: 74 IN | TEMPERATURE: 98.1 F | DIASTOLIC BLOOD PRESSURE: 69 MMHG | BODY MASS INDEX: 27.08 KG/M2 | SYSTOLIC BLOOD PRESSURE: 133 MMHG | HEART RATE: 64 BPM | WEIGHT: 211 LBS | OXYGEN SATURATION: 98 %

## 2025-07-08 DIAGNOSIS — E11.22 TYPE 2 DIABETES MELLITUS WITH STAGE 3A CHRONIC KIDNEY DISEASE, WITHOUT LONG-TERM CURRENT USE OF INSULIN (HCC): ICD-10-CM

## 2025-07-08 DIAGNOSIS — I10 ESSENTIAL (PRIMARY) HYPERTENSION: ICD-10-CM

## 2025-07-08 DIAGNOSIS — I72.3 BILATERAL ANEURYSM OF ILIAC ARTERY: ICD-10-CM

## 2025-07-08 DIAGNOSIS — I49.5 SICK SINUS SYNDROME (HCC): ICD-10-CM

## 2025-07-08 DIAGNOSIS — D69.6 THROMBOCYTOPENIA, UNSPECIFIED: ICD-10-CM

## 2025-07-08 DIAGNOSIS — N18.31 TYPE 2 DIABETES MELLITUS WITH STAGE 3A CHRONIC KIDNEY DISEASE, WITHOUT LONG-TERM CURRENT USE OF INSULIN (HCC): Primary | ICD-10-CM

## 2025-07-08 DIAGNOSIS — I25.118 ATHEROSCLEROTIC HEART DISEASE OF NATIVE CORONARY ARTERY WITH OTHER FORMS OF ANGINA PECTORIS: ICD-10-CM

## 2025-07-08 DIAGNOSIS — S46.811A STRAIN OF RIGHT TRAPEZIUS MUSCLE, INITIAL ENCOUNTER: ICD-10-CM

## 2025-07-08 DIAGNOSIS — E78.2 MIXED HYPERLIPIDEMIA: ICD-10-CM

## 2025-07-08 DIAGNOSIS — E11.22 TYPE 2 DIABETES MELLITUS WITH STAGE 3A CHRONIC KIDNEY DISEASE, WITHOUT LONG-TERM CURRENT USE OF INSULIN (HCC): Primary | ICD-10-CM

## 2025-07-08 DIAGNOSIS — N18.31 TYPE 2 DIABETES MELLITUS WITH STAGE 3A CHRONIC KIDNEY DISEASE, WITHOUT LONG-TERM CURRENT USE OF INSULIN (HCC): ICD-10-CM

## 2025-07-08 DIAGNOSIS — M54.41 ACUTE MIDLINE LOW BACK PAIN WITH RIGHT-SIDED SCIATICA: ICD-10-CM

## 2025-07-08 LAB
CREAT UR-MCNC: 171 MG/DL (ref 30–125)
MICROALBUMIN UR-MCNC: 1.99 MG/DL (ref 0–3)
MICROALBUMIN/CREAT UR-RTO: 12 MG/G (ref 0–30)

## 2025-07-08 PROCEDURE — 82570 ASSAY OF URINE CREATININE: CPT

## 2025-07-08 PROCEDURE — 82043 UR ALBUMIN QUANTITATIVE: CPT

## 2025-07-08 NOTE — PROGRESS NOTES
Erik Gallegos presents today for   Chief Complaint   Patient presents with    Hypertension    Cholesterol Problem    Diabetes     6 month follow up     Hip Pain     Right hip pain on and off.      Patient states he has back pain on and off as well.       \"Have you been to the ER, urgent care clinic since your last visit?  Hospitalized since your last visit?\"    NO    “Have you seen or consulted any other health care providers outside of [facility] since your last visit?”    NO           
pacemaker and is followed closely by cardiology      7. Bilateral aneurysm of iliac artery  I72.3 Currently stable and patient followed by vascular surgery every 6 months      8. Thrombocytopenia, unspecified  D69.6 Currently stable.  Patient has had bone marrow biopsy which has been negative and he continues to be monitored by hematology CBC with Auto Differential      9. Atherosclerotic heart disease of native coronary artery with other forms of angina pectoris  I25.118 Currently stable on aspirin 81 mg and Lipitor 20 mg.  Patient followed by cardiology                        Follow-up and Dispositions    Return in about 6 months (around 1/8/2026) for , Medicare Wellness, labs 1 week before.           Reviewed plan of care. Patient has provided input and agrees with goals.

## 2025-07-15 NOTE — PROGRESS NOTES
attendance policy, and his home exercise program. Patient was informed of possible discharge for non-compliance to our attendance policy per PPR form.We also discussed his POC as deemed appropriate by the treating therapist and physician. Patient verbalized understanding that his must show objective and functional improvement in an appropriate time frame. Patient verbalized understanding that should progress or compliance be lacking, we will contact the referring physician for further consultation to address and attempt to establish alternate treatment strategies as necessary and/or possibly discharge.    Certification Period: 7/16/2025 - 09/14/25    Rohan \"BJ\" BRIAN Ramirez, Cert. MDT, Cert. DN, Cert. SMT, Dip. Osteopractic     7/16/2025    7:46 AM    ===================================================================  I certify that the above Therapy Services are being furnished while the patient is under my care. I agree with the treatment plan and certify that this therapy is necessary.    Physician's Signature:_________________________   DATE:_________   TIME:________                           Jacob Alvarado MD    ** Signature, Date and Time must be completed for valid certification **  Please sign and return to [facility] or you may fax the signed copy to [phone].  Thank you.    
patient's ability to progress to PLOF and address remaining functional goals.  (see flow sheet as applicable)     Details if applicable:     29 29 [facility] Totals Reminder: bill using total billable min of TIMED therapeutic procedures (example: do not include dry needle or estim unattended, both untimed codes, in totals to left)  8-22 min = 1 unit; 23-37 min = 2 units; 38-52 min = 3 units; 53-67 min = 4 units; 68-82 min = 5 units   Total Total         [x]  Patient Education billed concurrently with other procedures   [x] Review HEP    [] Progressed/Changed HEP  [] Other:    ASSESSMENT/PLAN    Patient will continue to benefit from skilled PT services to modify and progress therapeutic interventions, analyze and address functional mobility deficits, analyze and address ROM deficits, analyze and address strength deficits, analyze and address soft tissue restrictions, analyze and cue for proper movement patterns, analyze and modify for postural abnormalities, and instruct in home and community integration to address functional deficits and attain remaining goals.    [x]  See Plan of Care - for goals and assessment     Rohan \"BJ\" MYLES RamirezT, Cert. MDT, Cert. DN, Cert. SMT, Dip. Osteopractic   7/16/2025    7:53 AM  Damien Cosme SPT       Justification for Eval Code Complexity:  Patient History : HIGH  Examination see exam HIGH  Clinical Presentation: LOW  Clinical Decision Making : ELAINA: 36%  If an interpreting service was utilized for treatment of this patient, the contents of this document represent the material reviewed with the patient via the .

## 2025-07-16 ENCOUNTER — HOSPITAL ENCOUNTER (OUTPATIENT)
Facility: HOSPITAL | Age: 85
Setting detail: RECURRING SERIES
Discharge: HOME | End: 2025-07-19
Attending: INTERNAL MEDICINE
Payer: MEDICARE

## 2025-07-16 PROCEDURE — 97110 THERAPEUTIC EXERCISES: CPT

## 2025-07-16 PROCEDURE — 97161 PT EVAL LOW COMPLEX 20 MIN: CPT

## 2025-07-16 PROCEDURE — 97535 SELF CARE MNGMENT TRAINING: CPT

## 2025-07-22 ENCOUNTER — APPOINTMENT (OUTPATIENT)
Facility: HOSPITAL | Age: 85
End: 2025-07-22
Attending: INTERNAL MEDICINE
Payer: MEDICARE

## 2025-07-23 ENCOUNTER — CLINICAL DOCUMENTATION (OUTPATIENT)
Age: 85
End: 2025-07-23

## 2025-07-23 NOTE — PROGRESS NOTES
Aorta scheduled for 7.24 cancelled per Dr. Emery. Patient had a CT scan in June of 2025 that measured aorta and common iliac arteries. Test not needed just keep office follow up appt. I called the patient and he understood.

## 2025-07-25 ENCOUNTER — HOSPITAL ENCOUNTER (OUTPATIENT)
Facility: HOSPITAL | Age: 85
Setting detail: RECURRING SERIES
Discharge: HOME | End: 2025-07-28
Attending: INTERNAL MEDICINE
Payer: MEDICARE

## 2025-07-25 PROCEDURE — 97110 THERAPEUTIC EXERCISES: CPT

## 2025-07-25 PROCEDURE — 97112 NEUROMUSCULAR REEDUCATION: CPT

## 2025-07-25 NOTE — PROGRESS NOTES
PHYSICAL / OCCUPATIONAL THERAPY - DAILY TREATMENT NOTE    Patient Name: Erik Gallegos    Date: 2025    : 1940  Insurance: Payor: MEDICARE / Plan: MEDICARE PART A AND B / Product Type: *No Product type* /      Patient  verified Yes     Visit #   Current / Total 2 16   Time   In / Out 9:22 10:01   Pain   In / Out 3/10 0/10   Subjective Functional Status/Changes: Pt reports not too sore today     TREATMENT AREA =  Acute midline low back pain with right-sided sciatica  Strain of right trapezius muscle, initial encounter  Lumbago with sciatica, right side    OBJECTIVE    Therapeutic Procedures:    Tx Min Billable or 1:1 Min (if diff from Tx Min) Procedure, Rationale, Specifics   24  34902 Therapeutic Exercise (timed):  increase ROM, strength, coordination, balance, and proprioception to improve patient's ability to progress to PLOF and address remaining functional goals. (see flow sheet as applicable)     Details if applicable:       15  36630 Neuromuscular Re-Education (timed):  improve balance, coordination, kinesthetic sense, posture, core stability and proprioception to improve patient's ability to develop conscious control of individual muscles and awareness of position of extremities in order to progress to PLOF and address remaining functional goals. (see flow sheet as applicable)     Details if applicable:     39  [facility] Totals Reminder: bill using total billable min of TIMED therapeutic procedures (example: do not include dry needle or estim unattended, both untimed codes, in totals to left)  8-22 min = 1 unit; 23-37 min = 2 units; 38-52 min = 3 units; 53-67 min = 4 units; 68-82 min = 5 units   Total Total     Charge Capture    [x]  Patient Education billed concurrently with other procedures   [x] Review HEP    [] Progressed/Changed HEP, detail:    [] Other detail:       Objective Information/Functional Measures/Assessment   Unable to get into SARAH position   Unable to keep pelvis/hips on jody

## 2025-07-29 ENCOUNTER — HOSPITAL ENCOUNTER (OUTPATIENT)
Facility: HOSPITAL | Age: 85
Setting detail: RECURRING SERIES
Discharge: HOME | End: 2025-08-01
Attending: INTERNAL MEDICINE
Payer: MEDICARE

## 2025-07-29 PROCEDURE — 97530 THERAPEUTIC ACTIVITIES: CPT

## 2025-07-29 PROCEDURE — 97110 THERAPEUTIC EXERCISES: CPT

## 2025-07-29 PROCEDURE — 97112 NEUROMUSCULAR REEDUCATION: CPT

## 2025-07-29 NOTE — PROGRESS NOTES
PHYSICAL THERAPY - DAILY TREATMENT NOTE (updated )    Patient Name: Erik Gallegos    Date: 25    : 1940  Insurance: Payor: MEDICARE / Plan: MEDICARE PART A AND B / Product Type: *No Product type* /      Patient  verified yes     Visit #   Current / Total 3 16   Time   In / Out [phone]   Pain   In / Out 2 2   Subjective Functional Status/Changes: \"My back is coming along nicely, I'm still having some R UT soreness/tight at the base of my neck and down my mid back at times. Only when I cover it at night it alleviates my symptoms.   On , pt reports having difficulty standing for more than 5 minutes. \"     TREATMENT AREA =  Acute midline low back pain with right-sided sciatica  Strain of right trapezius muscle, initial encounter  Lumbago with sciatica, right side    Next PN due 8-15-25  RC due 25  EARL    OBJECTIVE    Therapeutic Procedures:  Tx Min Billable or 1:1 Min (if diff from Tx Min) Procedure, Rationale, Specifics   15 15 78376 Therapeutic Exercise (timed):  increase ROM, strength, coordination, balance, and proprioception to improve patient's ability to progress to PLOF and address remaining functional goals. (see flow sheet as applicable)     Details if applicable:    Per flowsheet    15 15 84249 Neuromuscular Re-Education (timed):  improve balance, coordination, kinesthetic sense, posture, core stability and proprioception to improve patient's ability to develop conscious control of individual muscles and awareness of position of extremities in order to progress to PLOF and address remaining functional goals. (see flow sheet as applicable)     Details if applicable:   SNAGS, Prone TA draw and glute sets, LTR     12 12 10025 Therapeutic Activity (timed):  use of dynamic activities replicating functional movements to increase ROM, strength, coordination, balance, and proprioception in order to improve patient's ability to progress to PLOF and address remaining functional goals.  
mobility.   -Status at IE-Pt reports walking for  ft until having to rest due to pain.   5. Patient to demonstrate AROM L lateral flexion to 30 degrees in order to enter and exit a low vehicle.   -Status at IE- AROM L lateral flexion 18 degrees       PLAN  yes Continue plan of care  []  Upgrade activities as tolerated  []  Discharge: See DC Note  []  Other:    Rohan \"BJ\" Ashley, DPT, Cert. MDT, Cert. DN, Cert. SMT, Dip. Osteopractic   7/29/2025    7:39 AM    Damien NEWBY     If an interpreting service was utilized for treatment of this patient, the contents of this document represent the material reviewed with the patient via the .    Future Appointments   Date Time Provider Department Center   7/29/2025  8:00 AM Rohan Ramirez, PT [facility]   7/30/2025  2:00 PM CSI, PACER [facility]   8/1/2025  8:40 AM Madalyn Ruiz, PTA [facility]   8/5/2025  8:00 AM Behrens, Laura M, PTA [facility]   8/6/2025  9:00 AM Misael Emery MD [facility] AM[facility]   8/8/2025  8:00 AM Madalyn Ruiz, PTA [facility]   8/12/2025  8:00 AM Behrens, Laura M, PTA [facility]   8/15/2025  8:00 AM Charli Steiner, PT [facility]   8/19/2025  8:00 AM Madalyn Ruiz, PTA [facility]   8/21/2025  8:40 AM Rohan Ramirez, PT [facility]   8/26/2025  8:00 AM Rohan Ramirez, PT [facility]   8/29/2025  8:00 AM Behrens, Laura M, PTA [facility]   11/6/2025  8:20 AM Easton Bajwa MD [facility] BS AMB   12/12/2025  9:50 AM [facility] NURSE [facility] Sched   12/18/2025  9:20 AM Belen Jones PA-C [facility] Sched   12/31/2025  8:30 AM [facility] LAB VISIT [facility] DEP   1/9/2026  9:40 AM Jacob Alvarado MD [facility] DEP

## 2025-08-01 ENCOUNTER — HOSPITAL ENCOUNTER (OUTPATIENT)
Facility: HOSPITAL | Age: 85
Setting detail: RECURRING SERIES
Discharge: HOME | End: 2025-08-04
Attending: INTERNAL MEDICINE
Payer: MEDICARE

## 2025-08-01 PROCEDURE — 97530 THERAPEUTIC ACTIVITIES: CPT

## 2025-08-01 PROCEDURE — 97110 THERAPEUTIC EXERCISES: CPT

## 2025-08-01 PROCEDURE — 97112 NEUROMUSCULAR REEDUCATION: CPT

## 2025-08-05 ENCOUNTER — HOSPITAL ENCOUNTER (OUTPATIENT)
Facility: HOSPITAL | Age: 85
Setting detail: RECURRING SERIES
Discharge: HOME | End: 2025-08-08
Attending: INTERNAL MEDICINE
Payer: MEDICARE

## 2025-08-05 PROCEDURE — 97535 SELF CARE MNGMENT TRAINING: CPT

## 2025-08-05 PROCEDURE — 97530 THERAPEUTIC ACTIVITIES: CPT

## 2025-08-05 PROCEDURE — 97110 THERAPEUTIC EXERCISES: CPT

## 2025-08-06 ENCOUNTER — OFFICE VISIT (OUTPATIENT)
Age: 85
End: 2025-08-06
Payer: MEDICARE

## 2025-08-06 VITALS
DIASTOLIC BLOOD PRESSURE: 80 MMHG | HEART RATE: 65 BPM | WEIGHT: 213 LBS | BODY MASS INDEX: 27.34 KG/M2 | HEIGHT: 74 IN | SYSTOLIC BLOOD PRESSURE: 138 MMHG | OXYGEN SATURATION: 98 %

## 2025-08-06 DIAGNOSIS — I72.3 ANEURYSM OF LEFT COMMON ILIAC ARTERY: Primary | ICD-10-CM

## 2025-08-06 DIAGNOSIS — I71.23 ANEURYSM OF DESCENDING THORACIC AORTA WITHOUT RUPTURE: ICD-10-CM

## 2025-08-06 DIAGNOSIS — I72.3 ANEURYSM OF RIGHT COMMON ILIAC ARTERY: ICD-10-CM

## 2025-08-06 DIAGNOSIS — I72.3 ANEURYSM OF RIGHT INTERNAL ILIAC ARTERY: ICD-10-CM

## 2025-08-06 PROCEDURE — 1123F ACP DISCUSS/DSCN MKR DOCD: CPT | Performed by: SURGERY

## 2025-08-06 PROCEDURE — 1036F TOBACCO NON-USER: CPT | Performed by: SURGERY

## 2025-08-06 PROCEDURE — 99215 OFFICE O/P EST HI 40 MIN: CPT | Performed by: SURGERY

## 2025-08-06 PROCEDURE — G8428 CUR MEDS NOT DOCUMENT: HCPCS | Performed by: SURGERY

## 2025-08-06 PROCEDURE — 3075F SYST BP GE 130 - 139MM HG: CPT | Performed by: SURGERY

## 2025-08-06 PROCEDURE — 3079F DIAST BP 80-89 MM HG: CPT | Performed by: SURGERY

## 2025-08-06 PROCEDURE — G8419 CALC BMI OUT NRM PARAM NOF/U: HCPCS | Performed by: SURGERY

## 2025-08-06 PROCEDURE — 1125F AMNT PAIN NOTED PAIN PRSNT: CPT | Performed by: SURGERY

## 2025-08-08 ENCOUNTER — HOSPITAL ENCOUNTER (OUTPATIENT)
Facility: HOSPITAL | Age: 85
Setting detail: RECURRING SERIES
Discharge: HOME | End: 2025-08-11
Attending: INTERNAL MEDICINE
Payer: MEDICARE

## 2025-08-08 PROCEDURE — 97530 THERAPEUTIC ACTIVITIES: CPT

## 2025-08-08 PROCEDURE — 97110 THERAPEUTIC EXERCISES: CPT

## 2025-08-12 ENCOUNTER — HOSPITAL ENCOUNTER (OUTPATIENT)
Facility: HOSPITAL | Age: 85
Setting detail: RECURRING SERIES
Discharge: HOME | End: 2025-08-15
Attending: INTERNAL MEDICINE
Payer: MEDICARE

## 2025-08-12 PROCEDURE — 97110 THERAPEUTIC EXERCISES: CPT

## 2025-08-12 PROCEDURE — 97112 NEUROMUSCULAR REEDUCATION: CPT

## 2025-08-12 PROCEDURE — 97530 THERAPEUTIC ACTIVITIES: CPT

## 2025-08-15 ENCOUNTER — HOSPITAL ENCOUNTER (OUTPATIENT)
Facility: HOSPITAL | Age: 85
Setting detail: RECURRING SERIES
Discharge: HOME | End: 2025-08-18
Attending: INTERNAL MEDICINE
Payer: MEDICARE

## 2025-08-15 PROCEDURE — 97110 THERAPEUTIC EXERCISES: CPT

## 2025-08-15 PROCEDURE — 97112 NEUROMUSCULAR REEDUCATION: CPT

## 2025-08-15 PROCEDURE — 97530 THERAPEUTIC ACTIVITIES: CPT

## 2025-08-19 ENCOUNTER — HOSPITAL ENCOUNTER (OUTPATIENT)
Facility: HOSPITAL | Age: 85
Setting detail: RECURRING SERIES
Discharge: HOME | End: 2025-08-22
Attending: INTERNAL MEDICINE
Payer: MEDICARE

## 2025-08-19 PROCEDURE — 97110 THERAPEUTIC EXERCISES: CPT

## 2025-08-19 PROCEDURE — 97530 THERAPEUTIC ACTIVITIES: CPT

## 2025-08-19 PROCEDURE — 97112 NEUROMUSCULAR REEDUCATION: CPT

## 2025-08-21 ENCOUNTER — HOSPITAL ENCOUNTER (OUTPATIENT)
Facility: HOSPITAL | Age: 85
Setting detail: RECURRING SERIES
Discharge: HOME | End: 2025-08-24
Attending: INTERNAL MEDICINE
Payer: MEDICARE

## 2025-08-21 PROCEDURE — 97112 NEUROMUSCULAR REEDUCATION: CPT

## 2025-08-21 PROCEDURE — 97110 THERAPEUTIC EXERCISES: CPT

## 2025-08-21 PROCEDURE — 97530 THERAPEUTIC ACTIVITIES: CPT

## 2025-08-26 ENCOUNTER — HOSPITAL ENCOUNTER (OUTPATIENT)
Facility: HOSPITAL | Age: 85
Setting detail: RECURRING SERIES
Discharge: HOME | End: 2025-08-29
Attending: INTERNAL MEDICINE
Payer: MEDICARE

## 2025-08-26 PROCEDURE — 97110 THERAPEUTIC EXERCISES: CPT

## 2025-08-26 PROCEDURE — 97535 SELF CARE MNGMENT TRAINING: CPT

## 2025-08-26 PROCEDURE — 97530 THERAPEUTIC ACTIVITIES: CPT

## 2025-08-29 ENCOUNTER — APPOINTMENT (OUTPATIENT)
Facility: HOSPITAL | Age: 85
End: 2025-08-29
Attending: INTERNAL MEDICINE
Payer: MEDICARE

## (undated) DEVICE — LIMB HOLDER, WRIST/ANKLE: Brand: DEROYAL

## (undated) DEVICE — CABLE PACE ALGTR CLP SAF 12FT --

## (undated) DEVICE — SHEATH RAIN RAD INTRO SHTH W/ BARE WIRE 10 CM 506610S

## (undated) DEVICE — PACK PROCEDURE SURG VASC CATH 161 MMC LF

## (undated) DEVICE — DRESSING HEMSTAT W4XL4IN 4 PLY WHT IMPREG KAOLIN HYDRPHLC

## (undated) DEVICE — BAND HEMOSTAT DRY D-STAT RAD --

## (undated) DEVICE — DRAPE SURG W25XL50IN E OPN CIR BND BG

## (undated) DEVICE — Device

## (undated) DEVICE — COVER US PRB W15XL120CM W/ GEL RUBBERBAND TAPE STRP FLD GEN

## (undated) DEVICE — PROCEDURE KIT FLUID MGMT 10 FR CUST MAINFOLD

## (undated) DEVICE — CATHETER GUID 6FR L100CM PTFE XBLAD4 TRUELUMEN HYBRID BRAID

## (undated) DEVICE — KENDALL RADIOLUCENT FOAM MONITORING ELECTRODE RECTANGULAR SHAPE: Brand: KENDALL

## (undated) DEVICE — PLASMABLADE PS200-040 4.0: Brand: PLASMABLADE™

## (undated) DEVICE — SET FLD ADMIN 3 W STPCOCK FIX FEM L BOR 1IN

## (undated) DEVICE — COPILOT BLEEDBACK CONTROL VALVE: Brand: COPILOT

## (undated) DEVICE — TREK CORONARY DILATATION CATHETER 3.0 MM X 12 MM / RAPID-EXCHANGE: Brand: TREK

## (undated) DEVICE — REM POLYHESIVE ADULT PATIENT RETURN ELECTRODE: Brand: VALLEYLAB

## (undated) DEVICE — COVADERM: Brand: DEROYAL

## (undated) DEVICE — GUIDEWIRE VASC L260CM DIA0035IN TIP L3MM PTFE J STD TAPR FIX

## (undated) DEVICE — SUTURE PERMA HND SZ 0 L18IN NONABSORBABLE BLK L30MM PSL REV 580H

## (undated) DEVICE — CATHETER GUID AD 6FR L100CM COR PERIPH GRN NYL PTFE AL 1

## (undated) DEVICE — RADIFOCUS OPTITORQUE ANGIOGRAPHIC CATHETER: Brand: OPTITORQUE

## (undated) DEVICE — CATH GUID 6F .070 XB 4 100CM -- VISTA BRITE TIP - ORDER AS EA

## (undated) DEVICE — NC TREK CORONARY DILATATION CATHETER 3.0 MM X 12 MM / RAPID-EXCHANGE: Brand: NC TREK

## (undated) DEVICE — DRAPE STRL ANGIO W/ 2 FLD COLLCTN PCH 86X135 218X343 CM 2

## (undated) DEVICE — DEVICE INFL W ACCS + HEMSTAS VLV INSRT TOOL AND TORQ BASIX

## (undated) DEVICE — MEDI-TRACE CADENCE ADULT, DEFIBRILLATION ELECTRODE -RTS  (10 PR/PK) - PHYSIO-CONTROL: Brand: MEDI-TRACE CADENCE

## (undated) DEVICE — HI-TORQUE BALANCE GUIDE WIRE W/HYDROCOAT .014 STRAIGHT TIP 3.0 CM X 190 CM: Brand: HI-TORQUE BALANCE

## (undated) DEVICE — PRESSURE MONITORING SET: Brand: TRUWAVE

## (undated) DEVICE — 3M™ IOBAN™ 2 ANTIMICROBIAL INCISE DRAPE 6640EZ: Brand: IOBAN™ 2

## (undated) DEVICE — ANGIOGRAPHY KIT CUST VASC

## (undated) DEVICE — NC TREK CORONARY DILATATION CATHETER 2.5 MM X 20 MM / RAPID-EXCHANGE: Brand: NC TREK

## (undated) DEVICE — DRESSING FOAM 4X6 DISP POSTOP MEPILEX BORD AG

## (undated) DEVICE — COPILOT KIT INCLUDES BLEEDBACK CONTROL VALVE 20/30 INDEFLATOR INFLATION DEVICE 30 ATM 20 CC / GUIDE WIRE INTRODUCER / TORQUE DEVICE: Brand: INDEFLATOR

## (undated) DEVICE — DRAPE,ANGIO,BRACH,STERILE,38X44: Brand: MEDLINE

## (undated) DEVICE — SUTURE VCRL SZ 4-0 L18IN ABSRB UD L19MM PC-5 3/8 CIR J823G

## (undated) DEVICE — SUTURE ABSORBABLE BRAIDED 2-0 CT-1 27 IN UD VICRYL J259H